# Patient Record
Sex: FEMALE | Race: WHITE | NOT HISPANIC OR LATINO | Employment: OTHER | URBAN - NONMETROPOLITAN AREA
[De-identification: names, ages, dates, MRNs, and addresses within clinical notes are randomized per-mention and may not be internally consistent; named-entity substitution may affect disease eponyms.]

---

## 2021-08-05 ENCOUNTER — ANESTHESIA EVENT (INPATIENT)
Dept: ANESTHESIOLOGY | Facility: HOSPITAL | Age: 81
DRG: 813 | End: 2021-08-05
Payer: MEDICARE

## 2021-08-05 ENCOUNTER — ANESTHESIA (INPATIENT)
Dept: ANESTHESIOLOGY | Facility: HOSPITAL | Age: 81
DRG: 813 | End: 2021-08-05
Payer: MEDICARE

## 2021-08-05 ENCOUNTER — HOSPITAL ENCOUNTER (INPATIENT)
Facility: HOSPITAL | Age: 81
LOS: 8 days | Discharge: HOME HEALTH | DRG: 813 | End: 2021-08-13
Attending: EMERGENCY MEDICINE | Admitting: ANESTHESIOLOGY
Payer: MEDICARE

## 2021-08-05 ENCOUNTER — APPOINTMENT (OUTPATIENT)
Dept: GASTROENTEROLOGY | Facility: HOSPITAL | Age: 81
DRG: 813 | End: 2021-08-05
Attending: INTERNAL MEDICINE
Payer: MEDICARE

## 2021-08-05 ENCOUNTER — ANESTHESIA EVENT (INPATIENT)
Dept: GASTROENTEROLOGY | Facility: HOSPITAL | Age: 81
DRG: 813 | End: 2021-08-05
Payer: MEDICARE

## 2021-08-05 ENCOUNTER — ANESTHESIA (INPATIENT)
Dept: GASTROENTEROLOGY | Facility: HOSPITAL | Age: 81
DRG: 813 | End: 2021-08-05
Payer: MEDICARE

## 2021-08-05 ENCOUNTER — APPOINTMENT (EMERGENCY)
Dept: RADIOLOGY | Facility: HOSPITAL | Age: 81
DRG: 813 | End: 2021-08-05
Payer: MEDICARE

## 2021-08-05 DIAGNOSIS — K92.2 GASTROINTESTINAL HEMORRHAGE, UNSPECIFIED GASTROINTESTINAL HEMORRHAGE TYPE: Primary | ICD-10-CM

## 2021-08-05 DIAGNOSIS — I48.20 CHRONIC ATRIAL FIBRILLATION (HCC): ICD-10-CM

## 2021-08-05 DIAGNOSIS — I48.91 ATRIAL FIBRILLATION WITH RVR (HCC): ICD-10-CM

## 2021-08-05 DIAGNOSIS — D62 ACUTE BLOOD LOSS ANEMIA: ICD-10-CM

## 2021-08-05 DIAGNOSIS — E87.2 LACTIC ACIDOSIS: ICD-10-CM

## 2021-08-05 PROBLEM — R65.10 SIRS (SYSTEMIC INFLAMMATORY RESPONSE SYNDROME) (HCC): Status: ACTIVE | Noted: 2021-08-05

## 2021-08-05 PROBLEM — E78.5 HYPERLIPIDEMIA: Status: ACTIVE | Noted: 2021-08-05

## 2021-08-05 PROBLEM — R79.1 SUPRATHERAPEUTIC INR: Status: ACTIVE | Noted: 2021-08-05

## 2021-08-05 PROBLEM — E87.20 LACTIC ACIDOSIS: Status: ACTIVE | Noted: 2021-08-05

## 2021-08-05 PROBLEM — I10 HYPERTENSION: Status: ACTIVE | Noted: 2021-08-05

## 2021-08-05 LAB
ABO GROUP BLD: NORMAL
ABO GROUP BLD: NORMAL
ALBUMIN SERPL BCP-MCNC: 2.1 G/DL (ref 3.5–5)
ALP SERPL-CCNC: 77 U/L (ref 46–116)
ALT SERPL W P-5'-P-CCNC: 33 U/L (ref 12–78)
ANION GAP SERPL CALCULATED.3IONS-SCNC: 15 MMOL/L (ref 4–13)
APTT PPP: 47 SECONDS (ref 23–37)
AST SERPL W P-5'-P-CCNC: 37 U/L (ref 5–45)
BASOPHILS # BLD AUTO: 0.03 THOUSANDS/ΜL (ref 0–0.1)
BASOPHILS # BLD AUTO: 0.04 THOUSANDS/ΜL (ref 0–0.1)
BASOPHILS NFR BLD AUTO: 0 % (ref 0–1)
BASOPHILS NFR BLD AUTO: 0 % (ref 0–1)
BILIRUB SERPL-MCNC: 1.08 MG/DL (ref 0.2–1)
BLD GP AB SCN SERPL QL: POSITIVE
BLOOD GROUP ANTIBODIES SERPL: NORMAL
BUN SERPL-MCNC: 34 MG/DL (ref 5–25)
CALCIUM ALBUM COR SERPL-MCNC: 8.4 MG/DL (ref 8.3–10.1)
CALCIUM SERPL-MCNC: 6.9 MG/DL (ref 8.3–10.1)
CHLORIDE SERPL-SCNC: 105 MMOL/L (ref 100–108)
CO2 SERPL-SCNC: 21 MMOL/L (ref 21–32)
CREAT SERPL-MCNC: 1.16 MG/DL (ref 0.6–1.3)
E AG RBC QL: NEGATIVE
EOSINOPHIL # BLD AUTO: 0.02 THOUSAND/ΜL (ref 0–0.61)
EOSINOPHIL # BLD AUTO: 0.03 THOUSAND/ΜL (ref 0–0.61)
EOSINOPHIL NFR BLD AUTO: 0 % (ref 0–6)
EOSINOPHIL NFR BLD AUTO: 0 % (ref 0–6)
ERYTHROCYTE [DISTWIDTH] IN BLOOD BY AUTOMATED COUNT: 15.7 % (ref 11.6–15.1)
ERYTHROCYTE [DISTWIDTH] IN BLOOD BY AUTOMATED COUNT: 15.9 % (ref 11.6–15.1)
GFR SERPL CREATININE-BSD FRML MDRD: 44 ML/MIN/1.73SQ M
GLUCOSE SERPL-MCNC: 165 MG/DL (ref 65–140)
HCT VFR BLD AUTO: 24.8 % (ref 34.8–46.1)
HCT VFR BLD AUTO: 25.4 % (ref 34.8–46.1)
HCT VFR BLD AUTO: 26.9 % (ref 34.8–46.1)
HGB BLD-MCNC: 7.5 G/DL (ref 11.5–15.4)
HGB BLD-MCNC: 7.8 G/DL (ref 11.5–15.4)
HGB BLD-MCNC: 8.4 G/DL (ref 11.5–15.4)
IMM GRANULOCYTES # BLD AUTO: 0.27 THOUSAND/UL (ref 0–0.2)
IMM GRANULOCYTES # BLD AUTO: 0.39 THOUSAND/UL (ref 0–0.2)
IMM GRANULOCYTES NFR BLD AUTO: 2 % (ref 0–2)
IMM GRANULOCYTES NFR BLD AUTO: 2 % (ref 0–2)
INR PPP: 1.67 (ref 0.84–1.19)
INR PPP: 5.49 (ref 0.84–1.19)
KELL GROUP AG RBC: NEGATIVE
LACTATE SERPL-SCNC: 1.5 MMOL/L (ref 0.5–2)
LACTATE SERPL-SCNC: 4.1 MMOL/L (ref 0.5–2)
LIPASE SERPL-CCNC: 329 U/L (ref 73–393)
LITTLE C AG RBC QL: NEGATIVE
LYMPHOCYTES # BLD AUTO: 1.84 THOUSANDS/ΜL (ref 0.6–4.47)
LYMPHOCYTES # BLD AUTO: 2.7 THOUSANDS/ΜL (ref 0.6–4.47)
LYMPHOCYTES NFR BLD AUTO: 10 % (ref 14–44)
LYMPHOCYTES NFR BLD AUTO: 16 % (ref 14–44)
MCH RBC QN AUTO: 27.9 PG (ref 26.8–34.3)
MCH RBC QN AUTO: 28.1 PG (ref 26.8–34.3)
MCHC RBC AUTO-ENTMCNC: 30.2 G/DL (ref 31.4–37.4)
MCHC RBC AUTO-ENTMCNC: 30.7 G/DL (ref 31.4–37.4)
MCV RBC AUTO: 91 FL (ref 82–98)
MCV RBC AUTO: 93 FL (ref 82–98)
MONOCYTES # BLD AUTO: 0.93 THOUSAND/ΜL (ref 0.17–1.22)
MONOCYTES # BLD AUTO: 1.51 THOUSAND/ΜL (ref 0.17–1.22)
MONOCYTES NFR BLD AUTO: 5 % (ref 4–12)
MONOCYTES NFR BLD AUTO: 9 % (ref 4–12)
NEUTROPHILS # BLD AUTO: 12.63 THOUSANDS/ΜL (ref 1.85–7.62)
NEUTROPHILS # BLD AUTO: 15.06 THOUSANDS/ΜL (ref 1.85–7.62)
NEUTS SEG NFR BLD AUTO: 73 % (ref 43–75)
NEUTS SEG NFR BLD AUTO: 83 % (ref 43–75)
NRBC BLD AUTO-RTO: 0 /100 WBCS
NRBC BLD AUTO-RTO: 0 /100 WBCS
PLATELET # BLD AUTO: 275 THOUSANDS/UL (ref 149–390)
PLATELET # BLD AUTO: 345 THOUSANDS/UL (ref 149–390)
PMV BLD AUTO: 10.2 FL (ref 8.9–12.7)
PMV BLD AUTO: 10.3 FL (ref 8.9–12.7)
POTASSIUM SERPL-SCNC: 4.7 MMOL/L (ref 3.5–5.3)
PROT SERPL-MCNC: 5.4 G/DL (ref 6.4–8.2)
PROTHROMBIN TIME: 19.3 SECONDS (ref 11.6–14.5)
PROTHROMBIN TIME: 48.6 SECONDS (ref 11.6–14.5)
RBC # BLD AUTO: 2.67 MILLION/UL (ref 3.81–5.12)
RBC # BLD AUTO: 2.8 MILLION/UL (ref 3.81–5.12)
RH BLD: POSITIVE
RH BLD: POSITIVE
SODIUM SERPL-SCNC: 141 MMOL/L (ref 136–145)
SPECIMEN EXPIRATION DATE: NORMAL
TROPONIN I SERPL-MCNC: <0.02 NG/ML
WBC # BLD AUTO: 17.18 THOUSAND/UL (ref 4.31–10.16)
WBC # BLD AUTO: 18.27 THOUSAND/UL (ref 4.31–10.16)

## 2021-08-05 PROCEDURE — 99291 CRITICAL CARE FIRST HOUR: CPT | Performed by: EMERGENCY MEDICINE

## 2021-08-05 PROCEDURE — 96366 THER/PROPH/DIAG IV INF ADDON: CPT

## 2021-08-05 PROCEDURE — 88342 IMHCHEM/IMCYTCHM 1ST ANTB: CPT | Performed by: PATHOLOGY

## 2021-08-05 PROCEDURE — 99291 CRITICAL CARE FIRST HOUR: CPT | Performed by: ANESTHESIOLOGY

## 2021-08-05 PROCEDURE — 86902 BLOOD TYPE ANTIGEN DONOR EA: CPT

## 2021-08-05 PROCEDURE — 84484 ASSAY OF TROPONIN QUANT: CPT | Performed by: EMERGENCY MEDICINE

## 2021-08-05 PROCEDURE — 96375 TX/PRO/DX INJ NEW DRUG ADDON: CPT

## 2021-08-05 PROCEDURE — 86870 RBC ANTIBODY IDENTIFICATION: CPT | Performed by: EMERGENCY MEDICINE

## 2021-08-05 PROCEDURE — 80053 COMPREHEN METABOLIC PANEL: CPT | Performed by: EMERGENCY MEDICINE

## 2021-08-05 PROCEDURE — 85730 THROMBOPLASTIN TIME PARTIAL: CPT | Performed by: EMERGENCY MEDICINE

## 2021-08-05 PROCEDURE — 36430 TRANSFUSION BLD/BLD COMPNT: CPT

## 2021-08-05 PROCEDURE — 85014 HEMATOCRIT: CPT | Performed by: NURSE PRACTITIONER

## 2021-08-05 PROCEDURE — C9113 INJ PANTOPRAZOLE SODIUM, VIA: HCPCS | Performed by: EMERGENCY MEDICINE

## 2021-08-05 PROCEDURE — 99285 EMERGENCY DEPT VISIT HI MDM: CPT

## 2021-08-05 PROCEDURE — 85025 COMPLETE CBC W/AUTO DIFF WBC: CPT | Performed by: EMERGENCY MEDICINE

## 2021-08-05 PROCEDURE — 71045 X-RAY EXAM CHEST 1 VIEW: CPT

## 2021-08-05 PROCEDURE — 85610 PROTHROMBIN TIME: CPT | Performed by: NURSE PRACTITIONER

## 2021-08-05 PROCEDURE — 93005 ELECTROCARDIOGRAM TRACING: CPT

## 2021-08-05 PROCEDURE — 83690 ASSAY OF LIPASE: CPT | Performed by: EMERGENCY MEDICINE

## 2021-08-05 PROCEDURE — 86850 RBC ANTIBODY SCREEN: CPT | Performed by: EMERGENCY MEDICINE

## 2021-08-05 PROCEDURE — 85610 PROTHROMBIN TIME: CPT | Performed by: EMERGENCY MEDICINE

## 2021-08-05 PROCEDURE — 85018 HEMOGLOBIN: CPT | Performed by: NURSE PRACTITIONER

## 2021-08-05 PROCEDURE — 88341 IMHCHEM/IMCYTCHM EA ADD ANTB: CPT | Performed by: PATHOLOGY

## 2021-08-05 PROCEDURE — 96374 THER/PROPH/DIAG INJ IV PUSH: CPT

## 2021-08-05 PROCEDURE — 83605 ASSAY OF LACTIC ACID: CPT | Performed by: EMERGENCY MEDICINE

## 2021-08-05 PROCEDURE — 87040 BLOOD CULTURE FOR BACTERIA: CPT | Performed by: EMERGENCY MEDICINE

## 2021-08-05 PROCEDURE — 1123F ACP DISCUSS/DSCN MKR DOCD: CPT | Performed by: EMERGENCY MEDICINE

## 2021-08-05 PROCEDURE — 88305 TISSUE EXAM BY PATHOLOGIST: CPT | Performed by: PATHOLOGY

## 2021-08-05 PROCEDURE — 30233N1 TRANSFUSION OF NONAUTOLOGOUS RED BLOOD CELLS INTO PERIPHERAL VEIN, PERCUTANEOUS APPROACH: ICD-10-PCS | Performed by: EMERGENCY MEDICINE

## 2021-08-05 PROCEDURE — 86922 COMPATIBILITY TEST ANTIGLOB: CPT

## 2021-08-05 PROCEDURE — 96365 THER/PROPH/DIAG IV INF INIT: CPT

## 2021-08-05 PROCEDURE — 96361 HYDRATE IV INFUSION ADD-ON: CPT

## 2021-08-05 PROCEDURE — 86901 BLOOD TYPING SEROLOGIC RH(D): CPT | Performed by: EMERGENCY MEDICINE

## 2021-08-05 PROCEDURE — 86905 BLOOD TYPING RBC ANTIGENS: CPT

## 2021-08-05 PROCEDURE — 86900 BLOOD TYPING SEROLOGIC ABO: CPT | Performed by: EMERGENCY MEDICINE

## 2021-08-05 PROCEDURE — P9016 RBC LEUKOCYTES REDUCED: HCPCS

## 2021-08-05 PROCEDURE — 85025 COMPLETE CBC W/AUTO DIFF WBC: CPT | Performed by: NURSE PRACTITIONER

## 2021-08-05 PROCEDURE — 0DB68ZX EXCISION OF STOMACH, VIA NATURAL OR ARTIFICIAL OPENING ENDOSCOPIC, DIAGNOSTIC: ICD-10-PCS | Performed by: INTERNAL MEDICINE

## 2021-08-05 PROCEDURE — 86921 COMPATIBILITY TEST INCUBATE: CPT

## 2021-08-05 RX ORDER — SIMVASTATIN 10 MG
10 TABLET ORAL
COMMUNITY

## 2021-08-05 RX ORDER — METOPROLOL SUCCINATE 50 MG/1
50 TABLET, EXTENDED RELEASE ORAL DAILY
COMMUNITY

## 2021-08-05 RX ORDER — PROPOFOL 10 MG/ML
INJECTION, EMULSION INTRAVENOUS AS NEEDED
Status: DISCONTINUED | OUTPATIENT
Start: 2021-08-05 | End: 2021-08-05

## 2021-08-05 RX ORDER — LIDOCAINE HYDROCHLORIDE 10 MG/ML
INJECTION, SOLUTION EPIDURAL; INFILTRATION; INTRACAUDAL; PERINEURAL AS NEEDED
Status: DISCONTINUED | OUTPATIENT
Start: 2021-08-05 | End: 2021-08-05

## 2021-08-05 RX ORDER — SODIUM CHLORIDE, SODIUM GLUCONATE, SODIUM ACETATE, POTASSIUM CHLORIDE, MAGNESIUM CHLORIDE, SODIUM PHOSPHATE, DIBASIC, AND POTASSIUM PHOSPHATE .53; .5; .37; .037; .03; .012; .00082 G/100ML; G/100ML; G/100ML; G/100ML; G/100ML; G/100ML; G/100ML
100 INJECTION, SOLUTION INTRAVENOUS CONTINUOUS
Status: DISCONTINUED | OUTPATIENT
Start: 2021-08-05 | End: 2021-08-05

## 2021-08-05 RX ORDER — PRAVASTATIN SODIUM 20 MG
20 TABLET ORAL
Status: DISCONTINUED | OUTPATIENT
Start: 2021-08-05 | End: 2021-08-13 | Stop reason: HOSPADM

## 2021-08-05 RX ORDER — SUCCINYLCHOLINE/SOD CL,ISO/PF 100 MG/5ML
SYRINGE (ML) INTRAVENOUS AS NEEDED
Status: DISCONTINUED | OUTPATIENT
Start: 2021-08-05 | End: 2021-08-05

## 2021-08-05 RX ORDER — ACETAMINOPHEN 325 MG/1
650 TABLET ORAL EVERY 6 HOURS PRN
Status: DISCONTINUED | OUTPATIENT
Start: 2021-08-05 | End: 2021-08-13 | Stop reason: HOSPADM

## 2021-08-05 RX ORDER — PANTOPRAZOLE SODIUM 40 MG/1
40 TABLET, DELAYED RELEASE ORAL
Status: DISCONTINUED | OUTPATIENT
Start: 2021-08-05 | End: 2021-08-13 | Stop reason: HOSPADM

## 2021-08-05 RX ORDER — WARFARIN SODIUM 7.5 MG/1
TABLET ORAL
COMMUNITY
End: 2021-08-13 | Stop reason: HOSPADM

## 2021-08-05 RX ORDER — ACETAMINOPHEN 325 MG/1
650 TABLET ORAL EVERY 6 HOURS PRN
COMMUNITY

## 2021-08-05 RX ORDER — FERROUS SULFATE 325(65) MG
325 TABLET ORAL
Status: ON HOLD | COMMUNITY
End: 2021-08-13 | Stop reason: SDUPTHER

## 2021-08-05 RX ORDER — DILTIAZEM HYDROCHLORIDE 5 MG/ML
15 INJECTION INTRAVENOUS ONCE
Status: COMPLETED | OUTPATIENT
Start: 2021-08-05 | End: 2021-08-05

## 2021-08-05 RX ORDER — LOPERAMIDE HYDROCHLORIDE 2 MG/1
2 CAPSULE ORAL 4 TIMES DAILY PRN
COMMUNITY

## 2021-08-05 RX ORDER — PANTOPRAZOLE SODIUM 40 MG/1
40 INJECTION, POWDER, FOR SOLUTION INTRAVENOUS ONCE
Status: COMPLETED | OUTPATIENT
Start: 2021-08-05 | End: 2021-08-05

## 2021-08-05 RX ORDER — METOPROLOL SUCCINATE 50 MG/1
50 TABLET, EXTENDED RELEASE ORAL DAILY
Status: DISCONTINUED | OUTPATIENT
Start: 2021-08-05 | End: 2021-08-13 | Stop reason: HOSPADM

## 2021-08-05 RX ORDER — CEFTRIAXONE 1 G/50ML
1000 INJECTION, SOLUTION INTRAVENOUS ONCE
Status: COMPLETED | OUTPATIENT
Start: 2021-08-05 | End: 2021-08-05

## 2021-08-05 RX ORDER — PREDNISONE 20 MG/1
TABLET ORAL DAILY
COMMUNITY
End: 2021-08-13 | Stop reason: HOSPADM

## 2021-08-05 RX ORDER — FENTANYL CITRATE 50 UG/ML
INJECTION, SOLUTION INTRAMUSCULAR; INTRAVENOUS AS NEEDED
Status: DISCONTINUED | OUTPATIENT
Start: 2021-08-05 | End: 2021-08-05

## 2021-08-05 RX ADMIN — PROPOFOL 120 MG: 10 INJECTION, EMULSION INTRAVENOUS at 13:22

## 2021-08-05 RX ADMIN — PANTOPRAZOLE SODIUM 40 MG: 40 INJECTION, POWDER, FOR SOLUTION INTRAVENOUS at 04:07

## 2021-08-05 RX ADMIN — Medication 3500 UNITS: at 05:53

## 2021-08-05 RX ADMIN — CEFTRIAXONE 1000 MG: 1 INJECTION, SOLUTION INTRAVENOUS at 04:42

## 2021-08-05 RX ADMIN — SODIUM CHLORIDE, SODIUM GLUCONATE, SODIUM ACETATE, POTASSIUM CHLORIDE, MAGNESIUM CHLORIDE, SODIUM PHOSPHATE, DIBASIC, AND POTASSIUM PHOSPHATE 100 ML/HR: .53; .5; .37; .037; .03; .012; .00082 INJECTION, SOLUTION INTRAVENOUS at 10:28

## 2021-08-05 RX ADMIN — Medication 100 MG: at 13:22

## 2021-08-05 RX ADMIN — SODIUM CHLORIDE 1500 ML: 0.9 INJECTION, SOLUTION INTRAVENOUS at 04:45

## 2021-08-05 RX ADMIN — PRAVASTATIN SODIUM 20 MG: 20 TABLET ORAL at 16:17

## 2021-08-05 RX ADMIN — DILTIAZEM HYDROCHLORIDE 15 MG: 5 INJECTION INTRAVENOUS at 04:07

## 2021-08-05 RX ADMIN — LIDOCAINE HYDROCHLORIDE 50 MG: 10 INJECTION, SOLUTION EPIDURAL; INFILTRATION; INTRACAUDAL; PERINEURAL at 13:22

## 2021-08-05 RX ADMIN — PHYTONADIONE 5 MG: 10 INJECTION, EMULSION INTRAMUSCULAR; INTRAVENOUS; SUBCUTANEOUS at 11:45

## 2021-08-05 RX ADMIN — SODIUM CHLORIDE 500 ML: 0.9 INJECTION, SOLUTION INTRAVENOUS at 04:12

## 2021-08-05 RX ADMIN — FENTANYL CITRATE 50 MCG: 50 INJECTION, SOLUTION INTRAMUSCULAR; INTRAVENOUS at 13:22

## 2021-08-05 RX ADMIN — SODIUM CHLORIDE 8 MG/HR: 9 INJECTION, SOLUTION INTRAVENOUS at 04:53

## 2021-08-05 RX ADMIN — PANTOPRAZOLE SODIUM 40 MG: 40 TABLET, DELAYED RELEASE ORAL at 16:17

## 2021-08-05 NOTE — H&P
[address]  H&P- Chirag Brooks 1940, 78 y o  female MRN: [id number]  Unit/Bed#: -01 Encounter: [id number]  Primary Care Provider: Poncho Vyas MD   Date and time admitted to hospital: 8/5/2021  4:02 AM    hemmorrhagic shock secondary to GI bleed  Assessment & Plan  · Secondary to GI bleeding as well as coagulopathy due to anticoagulation (coumadin)   · Son and patient report hx of GI bleed 15 years ago  · GI consulted and apprec recs  · Initial INR 5 49 - reversal with Kcentra per emergency medicine  · Patient transfused with 1 units uncross matched packed red blood cells-2 nd unit pending cross match of antibodies as well as fluid resuscitation  During recent admission to [facility] in, patient was found to have heme-positive stool, as well as intramuscular hematoma    During that admission, the patient did receive 1 unit packed red blood cells, 4 units FFP and vitamin K for anticoagulant reversal   · Transfuse for Hbg >7 0  · Hold all anticoagulation/antiplatelet medications at this time  · Strict cardiopulmonary monitoring    Supratherapeutic INR  Assessment & Plan  · Chronically on coumadin for hx of Afib and PE  · INR 5 49 on admission  · Received Kcentra 3,500 units in ED  · Trend INR    Atrial fibrillation ([address] )  Assessment & Plan  · Patient reports use of Coumadin times approximately 20 years  · Patient is additionally rate controlled with Toprol XL 50 mg daily  · Hold anticoagulation  at this time  · Currently rate controlled in the low 90's      Lactic acidosis  Assessment & Plan  · In the setting of acute blood loss anemia  · 4 5 on Admission  · Fluid resuscitated with 2 L of NSS  · Repeat 1 5    Hyperlipidemia  Assessment & Plan  · Will substitute home pravachol for Lipitor while admitted    Hypertension  Assessment & Plan  · Currently holding home antihypertensives    SIRS (systemic inflammatory response syndrome) (HCC)  Assessment & Plan  · POA AEB tachycardia, lactic acidosis, and leukocytosis  · Most likely secondary to acute anemia, not concerning at this time for infectious etiology      -------------------------------------------------------------------------------------------------------------  Chief Complaint:  Vomiting    History of Present Illness     Rogelio Roca is a 78 y o  female a past medical history of AFib on Coumadin, PE, hypertension, hyperlipidemia who presents with symptomatic anemia secondary to GI bleed  Patient reported she had 1 episode of a large bloody vomitus as well as a large bloody BM yesterday  Hemoglobin at that time was 9 3, however today was down to 7 5 and the patient was hypotensive as well as tachycardic upon arrival to the ED  In the ED she was given Cardizem with minimal response  Critical Care is consulted She was transfused 1 units of of un cross-matched blood with appropriate response  She is also noted to have a supra therapeutic INR of 5 4 and was reversed with Kcentra prior to receiving blood transfusion Patient was started on Protonix drip as admitted to the ICU for further monitoring  History obtained from chart review and the patient   -------------------------------------------------------------------------------------------------------------  Dispo: Admit to Stepdown Level 1    Code Status: Level 1 - Full Code  --------------------------------------------------------------------------------------------------------------  Review of Systems   Constitutional: Negative for activity change and fatigue  HENT: Negative for sore throat  Respiratory: Negative for shortness of breath  Cardiovascular: Negative for chest pain, palpitations and leg swelling  Gastrointestinal: Positive for blood in stool and vomiting  Negative for abdominal distention and abdominal pain  Genitourinary: Negative for difficulty urinating  Musculoskeletal: Negative for arthralgias and joint swelling     Neurological: Negative for dizziness, weakness and light-headedness  All other systems reviewed and are negative  A 12-point, complete review of systems was reviewed and negative except as stated above     Physical Exam  Constitutional:       Appearance: She is obese  She is not ill-appearing  HENT:      Head: Normocephalic and atraumatic  Mouth/Throat:      Mouth: Mucous membranes are moist    Eyes:      Extraocular Movements: Extraocular movements intact  Conjunctiva/sclera: Conjunctivae normal       Pupils: Pupils are equal, round, and reactive to light  Cardiovascular:      Rate and Rhythm: Rhythm irregularly irregular  Pulses: Normal pulses  Heart sounds: S1 normal and S2 normal    Pulmonary:      Effort: No respiratory distress  Breath sounds: Normal breath sounds  Comments: CTA in all lung fields  Abdominal:      General: Bowel sounds are normal       Palpations: Abdomen is soft  Tenderness: There is no abdominal tenderness  Musculoskeletal:         General: Normal range of motion  Right lower leg: No edema  Left lower leg: No edema  Skin:     General: Skin is warm  Capillary Refill: Capillary refill takes 2 to 3 seconds  Coloration: Skin is pale  Neurological:      General: No focal deficit present  Mental Status: She is oriented to person, place, and time  GCS: GCS eye subscore is 4  GCS verbal subscore is 5  GCS motor subscore is 6  Psychiatric:         Attention and Perception: Attention and perception normal          Speech: Speech normal          Behavior: Behavior normal  Behavior is cooperative  Thought Content:  Thought content normal        --------------------------------------------------------------------------------------------------------------  Vitals:   Vitals:    08/05/21 0645 08/05/21 0730 08/05/21 0800 08/05/21 0855   BP: 113/60 115/59 119/70 124/65   BP Location: Left arm Left arm Left arm Left arm   Pulse: 96 104 99 99   Resp: 22 (!) 29 (!) 30 (!) 28   Temp:    98 1 °F (36 7 °C)   TempSrc:    Oral   SpO2: 93% 97% 93% 95%   Weight:    92 2 kg (203 lb 4 2 oz)   Height:    5' 4" (1 626 m)     Temp  Min: 97 2 °F (36 2 °C)  Max: 98 1 °F (36 7 °C)  IBW (Ideal Body Weight): 54 7 kg  Height: 5' 4" (162 6 cm)  Body mass index is 34 89 kg/m²  Laboratory and Diagnostics:  Results from last 7 days   Lab Units 08/05/21  0407   WBC Thousand/uL 18 27*   HEMOGLOBIN g/dL 7 5*   HEMATOCRIT % 24 8*   PLATELETS Thousands/uL 345   NEUTROS PCT % 83*   MONOS PCT % 5     Results from last 7 days   Lab Units 08/05/21  0407   SODIUM mmol/L 141   POTASSIUM mmol/L 4 7   CHLORIDE mmol/L 105   CO2 mmol/L 21   ANION GAP mmol/L 15*   BUN mg/dL 34*   CREATININE mg/dL 1 16   CALCIUM mg/dL 6 9*   GLUCOSE RANDOM mg/dL 165*   ALT U/L 33   AST U/L 37   ALK PHOS U/L 77   ALBUMIN g/dL 2 1*   TOTAL BILIRUBIN mg/dL 1 08*          Results from last 7 days   Lab Units 08/05/21  0407   INR  5 49*   PTT seconds 47*      Results from last 7 days   Lab Units 08/05/21  0407   TROPONIN I ng/mL <0 02     Results from last 7 days   Lab Units 08/05/21  0641 08/05/21  0407   LACTIC ACID mmol/L 1 5 4 1*     ABG:    VBG:          Micro:        EKG: Atrial Fibrillation rate 102  Imaging:  XR chest 1 view portable   ED Interpretation by Kylie Carvalho MD (67/06 [id number])   Questionable slight pulmonary vascular congestion  Patient has no complaints of shortness of breath  Final Result by Kiarra Pichardo MD (08/05 [id number])      No acute cardiopulmonary disease  Workstation performed: [id number]            I have personally reviewed pertinent reports          Historical Information   Past Medical History:   Diagnosis Date    Anemia     Atrial fibrillation (HCC)     Bradycardia     GERD (gastroesophageal reflux disease)     History of pulmonary embolus (PE)     History of transfusion     Hypertension     Osteoarthritis     Radiculopathy     Vitamin D deficiency      Past Surgical History:   Procedure Laterality Date    APPENDECTOMY      BACK SURGERY      EYE SURGERY      JOINT REPLACEMENT       Social History   Social History     Substance and Sexual Activity   Alcohol Use Not Currently    Alcohol/week: 0 0 standard drinks    Comment: 0     Social History     Substance and Sexual Activity   Drug Use Never     Social History     Tobacco Use   Smoking Status Never Smoker   Smokeless Tobacco Never Used     Exercise History:[employer]  Family History:   History reviewed  No pertinent family history  I have reviewed this patient's family history and commented on sigificant items within the HPI      Medications:  Current Facility-Administered Medications   Medication Dose Route Frequency    acetaminophen (TYLENOL) tablet 650 mg  650 mg Oral Q6H PRN    metoprolol succinate (TOPROL-XL) 24 hr tablet 50 mg  50 mg Oral Daily    pantoprazole (PROTONIX) 80 mg in sodium chloride 0 9 % 100 mL infusion  8 mg/hr Intravenous Continuous    pravastatin (PRAVACHOL) tablet 20 mg  20 mg Oral Daily With Dinner     Home medications:  Prior to Admission Medications   Prescriptions Last Dose Informant Patient Reported? Taking?    Latanoprost 0 005 % EMUL 8/4/2021 at Unknown time  Yes Yes   Sig: Apply to eye   acetaminophen (TYLENOL) 325 mg tablet 8/4/2021 at Unknown time  Yes Yes   Sig: Take 650 mg by mouth every 6 (six) hours as needed for mild pain   ferrous sulfate 325 (65 Fe) mg tablet 8/4/2021 at Unknown time  Yes Yes   Sig: Take 325 mg by mouth daily with breakfast   loperamide (Imodium A-D) 2 mg capsule 8/4/2021 at Unknown time  Yes Yes   Sig: Take 2 mg by mouth 4 (four) times a day as needed for diarrhea   metoprolol succinate (TOPROL-XL) 50 mg 24 hr tablet 8/4/2021 at Unknown time  Yes Yes   Sig: Take 50 mg by mouth daily   predniSONE 20 mg tablet 8/4/2021 at Unknown time  Yes Yes   Sig: Take by mouth daily   simvastatin (ZOCOR) 10 mg tablet 8/4/2021 at Unknown time  Yes Yes Sig: Take 10 mg by mouth daily at bedtime   warfarin (COUMADIN) 7 5 mg tablet 8/4/2021 at Unknown time  Yes Yes   Sig: Take by mouth daily      Facility-Administered Medications: None     Allergies: Allergies   Allergen Reactions    Nsaids Other (See Comments)     ulcers       ------------------------------------------------------------------------------------------------------------  Advance Directive and Living Will:      Power of :    POLST:    ------------------------------------------------------------------------------------------------------------  Anticipated Length of Stay is > 2 midnights    Care Time Delivered:   Upon my evaluation, this patient had a high probability of imminent or life-threatening deterioration due to Blood loss anemia, which required my direct attention, intervention, and personal management  I have personally provided 30 minutes (0730 to 0800) of critical care time, exclusive of procedures, teaching, family meetings, and any prior time recorded by providers other than myself  CHARLI Louie        Portions of the record may have been created with voice recognition software  Occasional wrong word or "sound a like" substitutions may have occurred due to the inherent limitations of voice recognition software    Read the chart carefully and recognize, using context, where substitutions have occurred

## 2021-08-05 NOTE — ANESTHESIA POSTPROCEDURE EVALUATION
Post-Op Assessment Note    CV Status:  Stable  Pain Score: 0    Pain management: adequate     Mental Status:  Alert and awake   Hydration Status:  Stable   PONV Controlled:  Controlled   Airway Patency:  Patent      Post Op Vitals Reviewed: Yes      Staff: CRNA         No complications documented      BP  95/70    Temp      Pulse 108   Resp   22   SpO2   98

## 2021-08-05 NOTE — ASSESSMENT & PLAN NOTE
· Patient reports use of Coumadin times approximately 20 years  · Patient is additionally rate controlled with Toprol XL 50 mg daily  · Hold anticoagulation  at this time  · Currently rate controlled in the low 90's

## 2021-08-05 NOTE — CONSULTS
Consultation - [facility]  Yumiko Garcia 78 y o  female MRN: [id number]  Unit/Bed#: -01 Encounter: [id number]        Inpatient consult to gastroenterology  Consult performed by: Sahra Sal MD  Consult ordered by: Tawanda Birmingham          Reason for Consult / Principal Problem:     Upper GI bleed  Hematemesis  Hematochezia  Acute anemia  Supratherapeutic INR    ASSESSMENT AND PLAN:      Upper GI bleed  Hematemesis  Hematochezia  Acute anemia  Supratherapeutic INR  -given her symptoms suspect upper GI bleed and most likely likely PUD, other differentials include Dieulafoy's lesions, AVMs, polyps  -status post Kcentra and vitamin K with improvement in INR  -status post 1 unit PRBCs with hemoglobin greater than 7  -agree with Protonix infusion  -keep NPO  -EGD today, if no upper GI source found may need to consider colonoscopy tomorrow    ______________________________________________________________________    HPI:  49-year-old female seen in consultation for hematemesis, melena, acute anemia in the setting of supratherapeutic INR on Coumadin  She has significant past medical history for AFib, VTE on Coumadin, hyperlipidemia, hypertension, GERD  Patient presents with hematochezia, melena and hematemesis early this a m   In the ED she was hypotensive and also had AFib with RVR  INR was 5 49  Presenting hemoglobin was 7 5 down from 9 3 on 08/02  INR was 5 49  She received Kcentra and a total of 1 unit PRBC with hemoglobin increased to 7 8  Rosella Goldmann She was recently admitted to [facility] on 07/20 for worsening hip pain thought to have worsening of her radiculopathy from spinal stenosis and was placed on steroids briefly  At that time she also developed anemia but CT scan was negative for retroperitoneal bleed, however there was an intramuscular hematoma in the left gluteus medius and minimus and presacral hematoma    They held her anticoagulation was given 1 unit PRBC and given 4 units of FFP and vitamin K her prednisone was discontinued and her anticoagulation was restarted  She was evaluated by GI and they recommended outpatient follow-up and workup of her anemia  REVIEW OF SYSTEMS:    CONSTITUTIONAL: Denies any fever, chills, rigors, and weight loss  HEENT: No earache or tinnitus  Denies hearing loss or visual disturbances  CARDIOVASCULAR: No chest pain or palpitations  RESPIRATORY: Denies any cough, hemoptysis, shortness of breath or dyspnea on exertion  GASTROINTESTINAL: As noted in the History of Present Illness  GENITOURINARY: No problems with urination  Denies any hematuria or dysuria  NEUROLOGIC: No dizziness or vertigo, denies headaches  MUSCULOSKELETAL: Denies any muscle or joint pain  SKIN: Denies skin rashes or itching  ENDOCRINE: Denies excessive thirst  Denies intolerance to heat or cold  PSYCHOSOCIAL: Denies depression or anxiety  Denies any recent memory loss  Historical Information   Past Medical History:   Diagnosis Date    Anemia     Atrial fibrillation (HCC)     Bradycardia     GERD (gastroesophageal reflux disease)     History of pulmonary embolus (PE)     History of transfusion     Hypertension     Osteoarthritis     Radiculopathy     Vitamin D deficiency      Past Surgical History:   Procedure Laterality Date    APPENDECTOMY      BACK SURGERY      EYE SURGERY      JOINT REPLACEMENT       Social History   Social History     Substance and Sexual Activity   Alcohol Use Not Currently    Alcohol/week: 0 0 standard drinks    Comment: 0     Social History     Substance and Sexual Activity   Drug Use Never     Social History     Tobacco Use   Smoking Status Never Smoker   Smokeless Tobacco Never Used     History reviewed  No pertinent family history      Meds/Allergies     Medications Prior to Admission   Medication    acetaminophen (TYLENOL) 325 mg tablet    ferrous sulfate 325 (65 Fe) mg tablet    Latanoprost 0 005 % EMUL    loperamide (Imodium A-D) 2 mg capsule    metoprolol succinate (TOPROL-XL) 50 mg 24 hr tablet    predniSONE 20 mg tablet    simvastatin (ZOCOR) 10 mg tablet    warfarin (COUMADIN) 7 5 mg tablet     Current Facility-Administered Medications   Medication Dose Route Frequency    acetaminophen (TYLENOL) tablet 650 mg  650 mg Oral Q6H PRN    metoprolol succinate (TOPROL-XL) 24 hr tablet 50 mg  50 mg Oral Daily    multi-electrolyte (PLASMALYTE-A/ISOLYTE-S PH 7 4) IV solution  100 mL/hr Intravenous Continuous    pantoprazole (PROTONIX) 80 mg in sodium chloride 0 9 % 100 mL infusion  8 mg/hr Intravenous Continuous    pravastatin (PRAVACHOL) tablet 20 mg  20 mg Oral Daily With Dinner       Allergies   Allergen Reactions    Nsaids Other (See Comments)     ulcers           Objective     Blood pressure 112/57, pulse 104, temperature 98 6 °F (37 °C), resp  rate 20, height 5' 4" (1 626 m), weight 92 1 kg (203 lb), SpO2 94 %  Body mass index is 34 84 kg/m²        Intake/Output Summary (Last 24 hours) at 8/5/2021 1410  Last data filed at 8/5/2021 1230  Gross per 24 hour   Intake 516 67 ml   Output --   Net 516 67 ml         PHYSICAL EXAM:      General Appearance:   Alert, cooperative, no distress, pale appearing   HEENT:   Normocephalic, atraumatic, anicteric      Neck:  Supple, symmetrical, trachea midline   Lungs:   respirations unlabored    Heart[de-identified]   Tachycardic    Abdomen:   Soft, non-tender, non-distended; normal bowel sounds; no masses, no organomegaly    Genitalia:   Deferred    Rectal:   Deferred    Extremities:  No cyanosis, clubbing or edema        Skin:  No jaundice, rashes, or lesions    Lymph nodes:  No palpable cervical lymphadenopathy        Lab Results:   Admission on 08/05/2021   Component Date Value    WBC 08/05/2021 18 27*    RBC 08/05/2021 2 67*    Hemoglobin 08/05/2021 7 5*    Hematocrit 08/05/2021 24 8*    MCV 08/05/2021 93     MCH 08/05/2021 28 1     MCHC 08/05/2021 30 2*    RDW 08/05/2021 15 9*    MPV 08/05/2021 10 3     Platelets 74/42/1477 345     nRBC 08/05/2021 0     Neutrophils Relative 08/05/2021 83*    Immat GRANS % 08/05/2021 2     Lymphocytes Relative 08/05/2021 10*    Monocytes Relative 08/05/2021 5     Eosinophils Relative 08/05/2021 0     Basophils Relative 08/05/2021 0     Neutrophils Absolute 08/05/2021 15 06*    Immature Grans Absolute 08/05/2021 0 39*    Lymphocytes Absolute 08/05/2021 1 84     Monocytes Absolute 08/05/2021 0 93     Eosinophils Absolute 08/05/2021 0 02     Basophils Absolute 08/05/2021 0 03     Protime 08/05/2021 48 6*    INR 08/05/2021 5 49*    PTT 08/05/2021 47*    Sodium 08/05/2021 141     Potassium 08/05/2021 4 7     Chloride 08/05/2021 105     CO2 08/05/2021 21     ANION GAP 08/05/2021 15*    BUN 08/05/2021 34*    Creatinine 08/05/2021 1 16     Glucose 08/05/2021 165*    Calcium 08/05/2021 6 9*    Corrected Calcium 08/05/2021 8 4     AST 08/05/2021 37     ALT 08/05/2021 33     Alkaline Phosphatase 08/05/2021 77     Total Protein 08/05/2021 5 4*    Albumin 08/05/2021 2 1*    Total Bilirubin 08/05/2021 1 08*    eGFR 08/05/2021 44     Lipase 08/05/2021 329     LACTIC ACID 08/05/2021 4 1*    Troponin I 08/05/2021 <0 02     ABO Grouping 08/05/2021 B     Rh Factor 08/05/2021 Positive     Antibody Screen 08/05/2021 Positive     Specimen Expiration Date 08/05/2021 [id number]     Unit Product Code 08/05/2021 [id number]     Unit Number 08/05/2021 [id number]     Unit ABO 08/05/2021 O     Unit RH 08/05/2021 POS     Crossmatch 08/05/2021 Compatible     Unit Dispense Status 08/05/2021 Issued     Unit Product Volume 08/05/2021 350     ABO Grouping 08/05/2021 B     Rh Factor 08/05/2021 Positive     Blood Culture 08/05/2021 Received in Microbiology Lab  Culture in Progress   Blood Culture 08/05/2021 Received in Microbiology Lab  Culture in Progress   LACTIC ACID 08/05/2021 1 5     ANTIBODY ID   #1 08/05/2021 Anti-c     Protime 08/05/2021 19 3*    INR 08/05/2021 1 67*    WBC 08/05/2021 17 18*    RBC 08/05/2021 2 80*    Hemoglobin 08/05/2021 7 8*    Hematocrit 08/05/2021 25 4*    MCV 08/05/2021 91     MCH 08/05/2021 27 9     MCHC 08/05/2021 30 7*    RDW 08/05/2021 15 7*    MPV 08/05/2021 10 2     Platelets 65/48/2072 275     nRBC 08/05/2021 0     Neutrophils Relative 08/05/2021 73     Immat GRANS % 08/05/2021 2     Lymphocytes Relative 08/05/2021 16     Monocytes Relative 08/05/2021 9     Eosinophils Relative 08/05/2021 0     Basophils Relative 08/05/2021 0     Neutrophils Absolute 08/05/2021 12 63*    Immature Grans Absolute 08/05/2021 0 27*    Lymphocytes Absolute 08/05/2021 2 70     Monocytes Absolute 08/05/2021 1 51*    Eosinophils Absolute 08/05/2021 0 03     Basophils Absolute 08/05/2021 0 04     E ANTIGEN 08/05/2021 Negative     c ANTIGEN 08/05/2021 Negative     MICH ANTIGEN 08/05/2021 Negative        Imaging Studies: I have personally reviewed pertinent imaging studies

## 2021-08-05 NOTE — QUICK NOTE
Progress Note - Triage Asssessment   Karly Aguilera 78 y o  female MRN: [id number]    Time Called: 4025  Date Called: 08/05/21  Room#: 02  Person requesting evaluation: Dr Hilary Jackson     Situation:     Patient is an 43-year-old female with a past medical history atrial fibrillation, on Coumadin, who presents to the [address] [place] [address] Emergency Department due to symptomatic anemia due to GI bleed  Earlier in the day, the patient had 1 episode of large volume bloody vomitus, as well as a large bloody bowel movement  Patient's hemoglobin yesterday was 9 3, today in the emergency department is 7 5  The patient was hypotensive with EMS prior to arrival, as well as tachycardic  Patient was given Cardizem in the emergency department  Upon evaluation by Critical Care, the patient is lying in bed in no acute distress  She is mentating appropriately, though she is pale  During evaluation, patient was hypotensive in the high 64N systolic  Interventions:   No acute interventions were required during initial evaluation          Triage Assessment:     Recommend patient receive 2 units of PRBCs, as well as fluid resuscitation  INR to be reversed by emergency medicine attending upon receipt of INR, pending during initial evaluation  Recommend continued close cardiopulmonary monitoring and further transfusion if viral signs become unstable  Recommendations discussed with Dr Hilary Jackson

## 2021-08-05 NOTE — ASSESSMENT & PLAN NOTE
· POA AEB tachycardia, lactic acidosis, and leukocytosis  · Most likely secondary to acute anemia, not concerning at this time for infectious etiology  · No indication for antibiotics at this time

## 2021-08-05 NOTE — H&P
History and Physical - [address] [facility]  Jaylene Toscano 78 y o  female MRN: [id number]                  HPI: Jaylene Toscano is a 78y o  year old female who presents for EGD for evaluation of hematemesis, melena and acute anemia in the setting of supratherapeutic INR with anticoagulation on Coumadin  She received Kcentra and INR decreased to 1 67 from 5 49  Patient received 1 unit PRBC, her last hemoglobin 7 8 slightly up from 7 5  REVIEW OF SYSTEMS: Per the HPI, and otherwise unremarkable  Historical Information   Past Medical History:   Diagnosis Date    Anemia     Atrial fibrillation (HCC)     Bradycardia     GERD (gastroesophageal reflux disease)     History of pulmonary embolus (PE)     History of transfusion     Hypertension     Osteoarthritis     Radiculopathy     Vitamin D deficiency      Past Surgical History:   Procedure Laterality Date    APPENDECTOMY      BACK SURGERY      EYE SURGERY      JOINT REPLACEMENT       Social History   Social History     Substance and Sexual Activity   Alcohol Use Not Currently    Alcohol/week: 0 0 standard drinks    Comment: 0     Social History     Substance and Sexual Activity   Drug Use Never     Social History     Tobacco Use   Smoking Status Never Smoker   Smokeless Tobacco Never Used     History reviewed  No pertinent family history  Meds/Allergies     Medications Prior to Admission   Medication    acetaminophen (TYLENOL) 325 mg tablet    ferrous sulfate 325 (65 Fe) mg tablet    Latanoprost 0 005 % EMUL    loperamide (Imodium A-D) 2 mg capsule    metoprolol succinate (TOPROL-XL) 50 mg 24 hr tablet    predniSONE 20 mg tablet    simvastatin (ZOCOR) 10 mg tablet    warfarin (COUMADIN) 7 5 mg tablet       Allergies   Allergen Reactions    Nsaids Other (See Comments)     ulcers       Objective     Blood pressure 126/65, pulse (!) 117, temperature 98 3 °F (36 8 °C), temperature source Oral, resp   rate 18, height 5' 4" (1 626 m), weight 92 1 kg (203 lb), SpO2 97 %  PHYSICAL EXAM    Gen: NAD  CV: RRR  CHEST: Clear  ABD: soft, NT/ND  EXT: no edema      ASSESSMENT/PLAN:  This is a 78y o  year old female here for EGD for evaluation of probable upper GI bleed      PLAN:   Procedure:  EGD

## 2021-08-05 NOTE — ASSESSMENT & PLAN NOTE
· In the setting of acute blood loss anemia  · 4 5 on Admission  · Fluid resuscitated with 2 L of NSS  · Repeat 1 5

## 2021-08-05 NOTE — ASSESSMENT & PLAN NOTE
· Patient reports use of Coumadin times approximately 20 years  · Patient is additionally rate controlled with Toprol XL 50 mg daily  · Anticoagulation is held at this time, will restart Coumadin this a m  if INR is therapeutic and H/H is stable  · Currently rate controlled in the low 90's

## 2021-08-05 NOTE — ED NOTES
Lab notified this RN that patient had antibodies in blood that needed further testing before RBCs could be sent unless Dr Marco Antonio Alanis felt the patient was to emergent to wait  After consulting with Dr Marco Antonio Alanis it was decided to do emergent release for blood due to patient condition        Charito Felton, ARGENIS  08/05/21 2752

## 2021-08-05 NOTE — ED PROVIDER NOTES
History  Chief Complaint   Patient presents with    Vomiting     Pt had epsiode of vomiting dark red emesis, pt also reports dark red BM throughout day wiht dizziness and fatigue      Patient is on Coumadin secondary to atrial fibrillation  Has been having dark red bloody stools today  Also vomited dark red blood today  Blood pressure was in the 80s at the scene  When the patient laid down she felt better  Did not feel as weak or tired  Systolic blood pressure then was in the 120s  Has history of atrial fibrillation  Was noted to have episodes of rapid atrial fibrillation in the ambulance up to the 140s  Patient denies any chest pain or shortness of breath  Complains of feeling weak and tired  No history of previous GI bleed  Denies any abdominal pain or chest pain  No shortness of breath  History provided by:  Patient and EMS personnel   used: No    Vomiting  Severity:  Moderate  Timing:  Intermittent  Emesis appearance: Dark red blood  Progression:  Unchanged  Chronicity:  New  Recent urination:  Normal  Relieved by:  Nothing  Worsened by:  Nothing  Ineffective treatments:  None tried  Associated symptoms: no abdominal pain, no chills, no cough, no diarrhea, no fever, no headaches and no sore throat        Prior to Admission Medications   Prescriptions Last Dose Informant Patient Reported? Taking?    Latanoprost 0 005 % EMUL 8/4/2021 at Unknown time  Yes Yes   Sig: Apply to eye   acetaminophen (TYLENOL) 325 mg tablet 8/4/2021 at Unknown time  Yes Yes   Sig: Take 650 mg by mouth every 6 (six) hours as needed for mild pain   ferrous sulfate 325 (65 Fe) mg tablet 8/4/2021 at Unknown time  Yes Yes   Sig: Take 325 mg by mouth daily with breakfast   loperamide (Imodium A-D) 2 mg capsule 8/4/2021 at Unknown time  Yes Yes   Sig: Take 2 mg by mouth 4 (four) times a day as needed for diarrhea   metoprolol succinate (TOPROL-XL) 50 mg 24 hr tablet 8/4/2021 at Unknown time  Yes Yes   Sig: Take 50 mg by mouth daily   predniSONE 20 mg tablet 8/4/2021 at Unknown time  Yes Yes   Sig: Take by mouth daily   simvastatin (ZOCOR) 10 mg tablet 8/4/2021 at Unknown time  Yes Yes   Sig: Take 10 mg by mouth daily at bedtime   warfarin (COUMADIN) 7 5 mg tablet 8/4/2021 at Unknown time  Yes Yes   Sig: Take by mouth daily      Facility-Administered Medications: None       Past Medical History:   Diagnosis Date    Anemia     Atrial fibrillation (HCC)     Bradycardia     GERD (gastroesophageal reflux disease)     History of pulmonary embolus (PE)     Hypertension     Osteoarthritis     Radiculopathy     Vitamin D deficiency        Past Surgical History:   Procedure Laterality Date    JOINT REPLACEMENT         History reviewed  No pertinent family history  I have reviewed and agree with the history as documented  E-Cigarette/Vaping     E-Cigarette/Vaping Substances     Social History     Tobacco Use    Smoking status: Not on file   Substance Use Topics    Alcohol use: Not on file    Drug use: Not on file       Review of Systems   Constitutional: Negative for chills and fever  HENT: Negative for ear pain, hearing loss, sore throat, trouble swallowing and voice change  Eyes: Negative for pain and discharge  Respiratory: Negative for cough, shortness of breath and wheezing  Cardiovascular: Negative for chest pain and palpitations  Gastrointestinal: Positive for blood in stool and vomiting  Negative for abdominal pain, constipation, diarrhea and nausea  Genitourinary: Negative for dysuria, flank pain, frequency and hematuria  Musculoskeletal: Negative for joint swelling, neck pain and neck stiffness  Skin: Negative for rash and wound  Neurological: Positive for dizziness and weakness  Negative for seizures, syncope, facial asymmetry and headaches  Psychiatric/Behavioral: Negative for hallucinations, self-injury and suicidal ideas     All other systems reviewed and are negative  Physical Exam  Physical Exam  Vitals and nursing note reviewed  Constitutional:       General: She is not in acute distress  Appearance: She is well-developed  HENT:      Head: Normocephalic and atraumatic  Comments: Pale in color     Right Ear: External ear normal       Left Ear: External ear normal    Eyes:      General: No scleral icterus  Right eye: No discharge  Left eye: No discharge  Extraocular Movements: Extraocular movements intact  Conjunctiva/sclera: Conjunctivae normal       Comments: Conjunctiva pale   Cardiovascular:      Rate and Rhythm: Tachycardia present  Rhythm irregular  Heart sounds: Normal heart sounds  No murmur heard  Pulmonary:      Effort: Pulmonary effort is normal       Breath sounds: Normal breath sounds  No wheezing or rales  Abdominal:      General: Bowel sounds are normal  There is no distension  Palpations: Abdomen is soft  Tenderness: There is no abdominal tenderness  There is no guarding or rebound  Genitourinary:     Comments: Dark red blood noted  Musculoskeletal:         General: No deformity  Normal range of motion  Cervical back: Normal range of motion and neck supple  Skin:     General: Skin is dry  Findings: No rash  Comments: Cold to touch and pale in color   Neurological:      General: No focal deficit present  Mental Status: She is alert and oriented to person, place, and time  Cranial Nerves: No cranial nerve deficit  Psychiatric:         Mood and Affect: Mood normal          Behavior: Behavior normal          Thought Content:  Thought content normal          Judgment: Judgment normal          Vital Signs  ED Triage Vitals [08/05/21 0403]   Temperature Pulse Respirations Blood Pressure SpO2   (!) 97 2 °F (36 2 °C) (!) 142 20 134/76 93 %      Temp Source Heart Rate Source Patient Position - Orthostatic VS BP Location FiO2 (%)   Temporal Monitor Lying Left arm -- Pain Score       --           Vitals:    08/05/21 0530 08/05/21 0615 08/05/21 0645 08/05/21 0730   BP: 102/57 100/55 113/60 115/59   Pulse: 95 100 96 104   Patient Position - Orthostatic VS: Lying Lying Lying Sitting         Visual Acuity      ED Medications  Medications   pantoprazole (PROTONIX) 80 mg in sodium chloride 0 9 % 100 mL infusion (8 mg/hr Intravenous New Bag 8/5/21 0453)   pantoprazole (PROTONIX) injection 40 mg (40 mg Intravenous Given 8/5/21 0407)   diltiazem (CARDIZEM) injection 15 mg (15 mg Intravenous Given 8/5/21 0407)   sodium chloride 0 9 % bolus 500 mL (0 mL Intravenous Stopped 8/5/21 0517)   cefTRIAXone (ROCEPHIN) IVPB (premix in dextrose) 1,000 mg 50 mL (0 mg Intravenous Stopped 8/5/21 0517)   sodium chloride 0 9 % bolus 1,500 mL (0 mL Intravenous Stopped 8/5/21 0547)   prothrombin complex conc human (KCENTRA) 3,500 Units (3,500 Units Intravenous Given 8/5/21 0553)       Diagnostic Studies  Results Reviewed     Procedure Component Value Units Date/Time    Lactic acid 2 Hours [[id number]]  (Normal) Collected: 08/05/21 0641    Lab Status: Final result Specimen: Blood from Arm, Left Updated: 08/05/21 0710     LACTIC ACID 1 5 mmol/L     Narrative:      Result may be elevated if tourniquet was used during collection  Lactic acid [[id number]]  (Abnormal) Collected: 08/05/21 0407    Lab Status: Final result Specimen: Blood from Arm, Left Updated: 08/05/21 0500     LACTIC ACID 4 1 mmol/L     Narrative:      Result may be elevated if tourniquet was used during collection  Troponin I [[id number]]  (Normal) Collected: 08/05/21 0407    Lab Status: Final result Specimen: Blood from Arm, Left Updated: 08/05/21 0450     Troponin I <0 02 ng/mL     Blood culture #2 [[id number]] Collected: 08/05/21 0440    Lab Status: In process Specimen: Blood from Hand, Right Updated: 08/05/21 0448    Blood culture #1 [[id number]] Collected: 08/05/21 0440    Lab Status:  In process Specimen: Blood from Arm, Left Updated: 08/05/21 0448    Protime-INR [[id number]]  (Abnormal) Collected: 08/05/21 0407    Lab Status: Final result Specimen: Blood from Arm, Left Updated: 08/05/21 0445     Protime 48 6 seconds      INR 5 49    APTT [[id number]]  (Abnormal) Collected: 08/05/21 0407    Lab Status: Final result Specimen: Blood from Arm, Left Updated: 08/05/21 0445     PTT 47 seconds     Comprehensive metabolic panel [[id number]]  (Abnormal) Collected: 08/05/21 0407    Lab Status: Final result Specimen: Blood from Arm, Left Updated: 08/05/21 0445     Sodium 141 mmol/L      Potassium 4 7 mmol/L      Chloride 105 mmol/L      CO2 21 mmol/L      ANION GAP 15 mmol/L      BUN 34 mg/dL      Creatinine 1 16 mg/dL      Glucose 165 mg/dL      Calcium 6 9 mg/dL      Corrected Calcium 8 4 mg/dL      AST 37 U/L      ALT 33 U/L      Alkaline Phosphatase 77 U/L      Total Protein 5 4 g/dL      Albumin 2 1 g/dL      Total Bilirubin 1 08 mg/dL      eGFR 44 ml/min/1 73sq m     Narrative:      [place] guidelines for Chronic Kidney Disease (CKD):     Stage 1 with normal or high GFR (GFR > 90 mL/min/1 73 square meters)    Stage 2 Mild CKD (GFR = 60-89 mL/min/1 73 square meters)    Stage 3A Moderate CKD (GFR = 45-59 mL/min/1 73 square meters)    Stage 3B Moderate CKD (GFR = 30-44 mL/min/1 73 square meters)    Stage 4 Severe CKD (GFR = 15-29 mL/min/1 73 square meters)    Stage 5 End Stage CKD (GFR <15 mL/min/1 73 square meters)  Note: GFR calculation is accurate only with a steady state creatinine    Lipase [[id number]]  (Normal) Collected: 08/05/21 0407    Lab Status: Final result Specimen: Blood from Arm, Left Updated: 08/05/21 0445     Lipase 329 u/L     CBC and differential [[id number]]  (Abnormal) Collected: 08/05/21 0407    Lab Status: Final result Specimen: Blood from Arm, Left Updated: 08/05/21 0415     WBC 18 27 Thousand/uL      RBC 2 67 Million/uL      Hemoglobin 7 5 g/dL      Hematocrit 24 8 %      MCV 93 fL      MCH 28 1 pg MCHC 30 2 g/dL      RDW 15 9 %      MPV 10 3 fL      Platelets 803 Thousands/uL      nRBC 0 /100 WBCs      Neutrophils Relative 83 %      Immat GRANS % 2 %      Lymphocytes Relative 10 %      Monocytes Relative 5 %      Eosinophils Relative 0 %      Basophils Relative 0 %      Neutrophils Absolute 15 06 Thousands/µL      Immature Grans Absolute 0 39 Thousand/uL      Lymphocytes Absolute 1 84 Thousands/µL      Monocytes Absolute 0 93 Thousand/µL      Eosinophils Absolute 0 02 Thousand/µL      Basophils Absolute 0 03 Thousands/µL                  XR chest 1 view portable   ED Interpretation by Yasir Rogel MD (08/05 2623)   Questionable slight pulmonary vascular congestion  Patient has no complaints of shortness of breath                   Procedures  ECG 12 Lead Documentation Only    Date/Time: 8/5/2021 4:08 AM  Performed by: Yasir Rogel MD  Authorized by: Yasir Rogel MD     ECG reviewed by me, the ED Provider: yes    Patient location:  ED  Previous ECG:     Previous ECG:  Unavailable  Rate:     ECG rate:  130  Rhythm:     Rhythm: atrial fibrillation      CriticalCare Time  Performed by: Yasir Rogel MD  Authorized by: Yasir Rogel MD     Critical care provider statement:     Critical care time (minutes):  60    Critical care was necessary to treat or prevent imminent or life-threatening deterioration of the following conditions:  Circulatory failure, shock and sepsis    Critical care was time spent personally by me on the following activities:  Development of treatment plan with patient or surrogate, discussions with consultants, evaluation of patient's response to treatment, examination of patient, review of old charts, re-evaluation of patient's condition, ordering and review of radiographic studies, ordering and review of laboratory studies and ordering and performing treatments and interventions             ED Course  ED Course as of Aug 05 0742   Thu Aug 05, 2021   7990 Blood consent signed and placed on clipboard      0414 Cardizem 15 mg IV given for rapid atrial fibrillation  Patient is still in atrial fibrillation but now rate controlled in the 90s  Systolic blood pressure in the 130s  2797 Systolic blood pressure in the 80s now  Giving IV fluids  6637 Systolic blood pressure now 106 as the patient is laying down now  Last blood pressure of 80 was when she was sitting up for an x-ray  3703 I will use the ideal body weight for the IV fluid bolus as the patient has a BMI greater than 30 and is obese  8959 Will give Kcentra at 35 units/kilogram per dose   INR(!!): 5 72   0443 Critical care has come down to see the patient  Request that the patient get the 2 units of blood and the Kcentra  Will re-evaluate afterward  0451 Patient has antibodies in her blood  Blood bank states it would take a few hours to get her crossed blood to the facility  Will give her on meds blood here  Patient was notified and made aware  1116 Probably secondary to GI bleed and atrial fibrillation with RVR  LACTIC ACID(!!): 4 1   0556 No issue so far during the transfusion  Patient's color has improved  Blood pressure in the high 90s to low 100s  Patient is more awake and talking       6362 Patient seen  She is awake alert and oriented  Has no complaints at present  Laying in bed at a 15 degree incline  Blood pressure is 107/58  Heart rate is 95 and still in atrial fibrillation  Lungs are clear to auscultation  Heart is a regular rate and rhythm  Radial pulses are 2+ bilaterally  Cap refills less than 2 seconds  Neurologic exam is nonfocal       [id number] Patient seen  First unit of blood is in  Blood pressure in the 110s  Patient having no complaints at present  No shortness of breath  Awake and alert  Has not had any more episodes of bloody bowel movements or hematemesis                                  SBIRT 20yo+      Most Recent Value   SBIRT (22 yo +) In order to provide better care to our patients, we are screening all of our patients for alcohol and drug use  Would it be okay to ask you these screening questions? Yes Filed at: 08/05/2021 6638   Initial Alcohol Screen: US AUDIT-C    1  How often do you have a drink containing alcohol?  0 Filed at: 08/05/2021 0612   2  How many drinks containing alcohol do you have on a typical day you are drinking? 0 Filed at: 08/05/2021 0612   3a  Male UNDER 65: How often do you have five or more drinks on one occasion? 0 Filed at: 08/05/2021 0612   3b  FEMALE Any Age, or MALE 65+: How often do you have 4 or more drinks on one occassion? 0 Filed at: 08/05/2021 0612   Audit-C Score  0 Filed at: 08/05/2021 7710   ASHTYN: How many times in the past year have you    Used an illegal drug or used a prescription medication for non-medical reasons? Never Filed at: 08/05/2021 6339                    MDM  Number of Diagnoses or Management Options     Amount and/or Complexity of Data Reviewed  Clinical lab tests: reviewed  Review and summarize past medical records: yes        Disposition  Final diagnoses:   Gastrointestinal hemorrhage, unspecified gastrointestinal hemorrhage type   Atrial fibrillation with RVR ([facility] [address] )   Lactic acidosis     Time reflects when diagnosis was documented in both MDM as applicable and the Disposition within this note     Time User Action Codes Description Comment    8/5/2021  4:09 AM Nicki Bandar Add [K92 2] Gastrointestinal hemorrhage, unspecified gastrointestinal hemorrhage type     8/5/2021  4:09 AM Ricardo Stoddard Add [I48 91] Atrial fibrillation with RVR ([facility] [address] )     8/5/2021  5:01 AM Ricardo Stoddard Add [E87 2] Lactic acidosis       ED Disposition     ED Disposition Condition Date/Time Comment    Admit Stable Thu Aug 5, 2021  7:09 AM Case was discussed with Dr Marilyn Valiente and the patient's admission status was agreed to be  to the service of Dr Marilyn Valiente           Follow-up Information    None Patient's Medications   Discharge Prescriptions    No medications on file     No discharge procedures on file      PDMP Review     None          ED Provider  Electronically Signed by           Gretta Thompson MD  08/05/21 73 [facility] Shweta Valero MD  08/05/21 6820

## 2021-08-05 NOTE — ASSESSMENT & PLAN NOTE
· Secondary to GI bleeding as well as coagulopathy due to anticoagulation (coumadin)   · Son and patient report hx of GI bleed 15 years ago  · GI consulted and apprec recs  · Initial INR 5 49 - reversal with Kcentra per emergency medicine as well as with vitamin K  · Patient transfused with 1 units uncross matched packed red blood cells-2 nd unit pending cross match of antibodies as well as fluid resuscitation  During recent admission to [facility], patient was found to have heme-positive stool, as well as intramuscular hematoma    During that admission, the patient did receive 1 unit packed red blood cells, 4 units FFP and vitamin K for anticoagulant reversal   · Transfuse for Hbg >7 0  · EGD 8/5-3 cm hital hernia,bxs, 2 Jensen class 3 ulcers w/o bleeding  · Protonix drip DC'ed, continue protonix 40 mg PO BID while in hospital then Protonix 40 mg daily for 3 months  · Coumadin currently held, will recheck INR and H/H this a m , if INR is therapeutic in H/H is stable will restart today  · Strict cardiopulmonary monitoring

## 2021-08-05 NOTE — PLAN OF CARE
Problem: Potential for Falls  Goal: Patient will remain free of falls  Description: INTERVENTIONS:  - Educate patient/family on patient safety including physical limitations  - Instruct patient to call for assistance with activity   - Consult OT/PT to assist with strengthening/mobility   - Keep Call bell within reach  - Keep bed low and locked with side rails adjusted as appropriate  - Keep care items and personal belongings within reach  - Initiate and maintain comfort rounds  - Make Fall Risk Sign visible to staff  - Offer Toileting every 2 Hours, in advance of need  - Initiate/Maintain bed alarm  - Obtain necessary fall risk management equipment:   - Apply yellow socks and bracelet for high fall risk patients  - Consider moving patient to room near nurses station  Outcome: Progressing     Problem: CARDIOVASCULAR - ADULT  Goal: Maintains optimal cardiac output and hemodynamic stability  Description: INTERVENTIONS:  - Monitor I/O, vital signs and rhythm  - Monitor for S/S and trends of decreased cardiac output  - Administer and titrate ordered vasoactive medications to optimize hemodynamic stability  - Assess quality of pulses, skin color and temperature  - Assess for signs of decreased coronary artery perfusion  - Instruct patient to report change in severity of symptoms  Outcome: Progressing  Goal: Absence of cardiac dysrhythmias or at baseline rhythm  Description: INTERVENTIONS:  - Continuous cardiac monitoring, vital signs, obtain 12 lead EKG if ordered  - Administer antiarrhythmic and heart rate control medications as ordered  - Monitor electrolytes and administer replacement therapy as ordered  Outcome: Progressing     Problem: GASTROINTESTINAL - ADULT  Goal: Minimal or absence of nausea and/or vomiting  Description: INTERVENTIONS:  - Administer IV fluids if ordered to ensure adequate hydration  - Maintain NPO status until nausea and vomiting are resolved  - Nasogastric tube if ordered  - Administer ordered antiemetic medications as needed  - Provide nonpharmacologic comfort measures as appropriate  - Advance diet as tolerated, if ordered  - Consider nutrition services referral to assist patient with adequate nutrition and appropriate food choices  Outcome: Progressing  Goal: Maintains or returns to baseline bowel function  Description: INTERVENTIONS:  - Assess bowel function  - Encourage oral fluids to ensure adequate hydration  - Administer IV fluids if ordered to ensure adequate hydration  - Administer ordered medications as needed  - Encourage mobilization and activity  - Consider nutritional services referral to assist patient with adequate nutrition and appropriate food choices  Outcome: Progressing  Goal: Maintains adequate nutritional intake  Description: INTERVENTIONS:  - Monitor percentage of each meal consumed  - Identify factors contributing to decreased intake, treat as appropriate  - Assist with meals as needed  - Monitor I&O, weight, and lab values if indicated  - Obtain nutrition services referral as needed  Outcome: Progressing  Goal: Establish and maintain optimal ostomy function  Description: INTERVENTIONS:  - Assess bowel function  - Encourage oral fluids to ensure adequate hydration  - Administer IV fluids if ordered to ensure adequate hydration   - Administer ordered medications as needed  - Encourage mobilization and activity  - Nutrition services referral to assist patient with appropriate food choices  - Assess stoma site  - Consider wound care consult   Outcome: Progressing  Goal: Oral mucous membranes remain intact  Description: INTERVENTIONS  - Assess oral mucosa and hygiene practices  - Implement preventative oral hygiene regimen  - Implement oral medicated treatments as ordered  - Initiate Nutrition services referral as needed  Outcome: Progressing     Problem: METABOLIC, FLUID AND ELECTROLYTES - ADULT  Goal: Electrolytes maintained within normal limits  Description: INTERVENTIONS:  - Monitor labs and assess patient for signs and symptoms of electrolyte imbalances  - Administer electrolyte replacement as ordered  - Monitor response to electrolyte replacements, including repeat lab results as appropriate  - Instruct patient on fluid and nutrition as appropriate  Outcome: Progressing  Goal: Fluid balance maintained  Description: INTERVENTIONS:  - Monitor labs   - Monitor I/O and WT  - Instruct patient on fluid and nutrition as appropriate  - Assess for signs & symptoms of volume excess or deficit  Outcome: Progressing  Goal: Glucose maintained within target range  Description: INTERVENTIONS:  - Monitor Blood Glucose as ordered  - Assess for signs and symptoms of hyperglycemia and hypoglycemia  - Administer ordered medications to maintain glucose within target range  - Assess nutritional intake and initiate nutrition service referral as needed  Outcome: Progressing

## 2021-08-05 NOTE — ASSESSMENT & PLAN NOTE
· Secondary to GI bleeding as well as coagulopathy due to anticoagulation (coumadin)   · Son and patient report hx of GI bleed 15 years ago  · GI consulted and apprec recs  · Initial INR 5 49 - reversal with Kcentra per emergency medicine  · Patient to be transfused with 2 units packed red blood cells as well as fluid resuscitation  During recent admission to [facility] in, patient was found to have heme-positive stool, as well as intramuscular hematoma    During that admission, the patient did receive 1 unit packed red blood cells, 4 units FFP and vitamin K for anticoagulant reversal   · Transfuse for Hbg >7 0  · Hold all anticoagulation/antiplatelet medications at this time  · Strict cardiopulmonary monitoring

## 2021-08-05 NOTE — ASSESSMENT & PLAN NOTE
· POA AEB tachycardia, lactic acidosis, and leukocytosis  · Most likely secondary to acute anemia, not concerning at this time for infectious etiology

## 2021-08-05 NOTE — PLAN OF CARE
Problem: Potential for Falls  Goal: Patient will remain free of falls  Description: INTERVENTIONS:  - Educate patient/family on patient safety including physical limitations  - Instruct patient to call for assistance with activity   - Consult OT/PT to assist with strengthening/mobility   - Keep Call bell within reach  - Keep bed low and locked with side rails adjusted as appropriate  - Keep care items and personal belongings within reach  - Initiate and maintain comfort rounds  - Make Fall Risk Sign visible to staff  - Offer Toileting every 2 Hours, in advance of need  - Initiate/Maintain bed alarm  - Obtain necessary fall risk management equipment:   - Apply yellow socks and bracelet for high fall risk patients  - Consider moving patient to room near nurses station  Outcome: Progressing     Problem: CARDIOVASCULAR - ADULT  Goal: Maintains optimal cardiac output and hemodynamic stability  Description: INTERVENTIONS:  - Monitor I/O, vital signs and rhythm  - Monitor for S/S and trends of decreased cardiac output  - Administer and titrate ordered vasoactive medications to optimize hemodynamic stability  - Assess quality of pulses, skin color and temperature  - Assess for signs of decreased coronary artery perfusion  - Instruct patient to report change in severity of symptoms  Outcome: Progressing  Goal: Absence of cardiac dysrhythmias or at baseline rhythm  Description: INTERVENTIONS:  - Continuous cardiac monitoring, vital signs, obtain 12 lead EKG if ordered  - Administer antiarrhythmic and heart rate control medications as ordered  - Monitor electrolytes and administer replacement therapy as ordered  Outcome: Progressing     Problem: GASTROINTESTINAL - ADULT  Goal: Minimal or absence of nausea and/or vomiting  Description: INTERVENTIONS:  - Administer IV fluids if ordered to ensure adequate hydration  - Maintain NPO status until nausea and vomiting are resolved  - Nasogastric tube if ordered  - Administer ordered antiemetic medications as needed  - Provide nonpharmacologic comfort measures as appropriate  - Advance diet as tolerated, if ordered  - Consider nutrition services referral to assist patient with adequate nutrition and appropriate food choices  Outcome: Progressing  Goal: Maintains or returns to baseline bowel function  Description: INTERVENTIONS:  - Assess bowel function  - Encourage oral fluids to ensure adequate hydration  - Administer IV fluids if ordered to ensure adequate hydration  - Administer ordered medications as needed  - Encourage mobilization and activity  - Consider nutritional services referral to assist patient with adequate nutrition and appropriate food choices  Outcome: Progressing  Goal: Maintains adequate nutritional intake  Description: INTERVENTIONS:  - Monitor percentage of each meal consumed  - Identify factors contributing to decreased intake, treat as appropriate  - Assist with meals as needed  - Monitor I&O, weight, and lab values if indicated  - Obtain nutrition services referral as needed  Outcome: Progressing  Goal: Establish and maintain optimal ostomy function  Description: INTERVENTIONS:  - Assess bowel function  - Encourage oral fluids to ensure adequate hydration  - Administer IV fluids if ordered to ensure adequate hydration   - Administer ordered medications as needed  - Encourage mobilization and activity  - Nutrition services referral to assist patient with appropriate food choices  - Assess stoma site  - Consider wound care consult   Outcome: Progressing  Goal: Oral mucous membranes remain intact  Description: INTERVENTIONS  - Assess oral mucosa and hygiene practices  - Implement preventative oral hygiene regimen  - Implement oral medicated treatments as ordered  - Initiate Nutrition services referral as needed  Outcome: Progressing     Problem: METABOLIC, FLUID AND ELECTROLYTES - ADULT  Goal: Electrolytes maintained within normal limits  Description: INTERVENTIONS:  - Monitor labs and assess patient for signs and symptoms of electrolyte imbalances  - Administer electrolyte replacement as ordered  - Monitor response to electrolyte replacements, including repeat lab results as appropriate  - Instruct patient on fluid and nutrition as appropriate  Outcome: Progressing  Goal: Fluid balance maintained  Description: INTERVENTIONS:  - Monitor labs   - Monitor I/O and WT  - Instruct patient on fluid and nutrition as appropriate  - Assess for signs & symptoms of volume excess or deficit  Outcome: Progressing  Goal: Glucose maintained within target range  Description: INTERVENTIONS:  - Monitor Blood Glucose as ordered  - Assess for signs and symptoms of hyperglycemia and hypoglycemia  - Administer ordered medications to maintain glucose within target range  - Assess nutritional intake and initiate nutrition service referral as needed  Outcome: Progressing     Problem: MOBILITY - ADULT  Goal: Maintain or return to baseline ADL function  Description: INTERVENTIONS:  -  Assess patient's ability to carry out ADLs; assess patient's baseline for ADL function and identify physical deficits which impact ability to perform ADLs (bathing, care of mouth/teeth, toileting, grooming, dressing, etc )  - Assess/evaluate cause of self-care deficits   - Assess range of motion  - Assess patient's mobility; develop plan if impaired  - Assess patient's need for assistive devices and provide as appropriate  - Encourage maximum independence but intervene and supervise when necessary  - Involve family in performance of ADLs  - Assess for home care needs following discharge   - Consider OT consult to assist with ADL evaluation and planning for discharge  - Provide patient education as appropriate  Outcome: Progressing

## 2021-08-05 NOTE — CASE MANAGEMENT
LOS 1 Day  Pt is not a bundle  Pt is not a 30 day readmission  Unplanned readmission score is 16 (green, low risk)  Pt admitted due to anemia/upper GI bleed  CM met with pt at bedside in ICU  CM name and role reviewed  Pt reported that she lives in a 3 story home with her son, Belen Guevara  There are 13 steps to enter, however, there is also a garage that has 3 steps to enter  She said that both sets of steps have rails  Pt's bedroom is on the second floor and there is a flight of steps to her bedroom  Pt said that she just takes one step at a time  She believes that she manages the stairs okay  She said that she uses a cane and a RW  She said that she uses the cane the most  Pt reported no D&A currently nor has a hx of it  Pt denied tobacco use  No MH concerns  Pt reported hx of VNA 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  Pt said that she uses [facility] in [place]  Pt reported that she has prescription coverage and has no issues with obtaining meds  She said that she takes her meds on her own and knows what meds are for  She reported that she can afford meds  She reported no POA  However, Shayla Sebastian is her primary   Pt drives  She said that Shayla Sebastian will transport home at discharge  CM reviewed discharge planning process including the following: identifying help at home, patient preference for discharge planning needs, pharmacy preference, and availability of treatment team to discuss questions or concerns patient and/or family may have regarding understanding medications and recognizing signs and symptoms once discharged  CM also encouraged patient to follow up with all recommended appointments after discharge  Patient advised of importance for patient and family to participate in managing patients medical well being  CM dept called [facility] and confirmed that pt was there for STR  DCP TBD

## 2021-08-05 NOTE — ASSESSMENT & PLAN NOTE
· In the setting of acute blood loss anemia, resolved  · 4 5 on Admission  · Fluid resuscitated with 2 L of NSS  · Repeat 1 5

## 2021-08-05 NOTE — ASSESSMENT & PLAN NOTE
· Chronically on coumadin for hx of Afib and PE  · INR 5 49 on admission  · Received Kcentra 3,500 units in ED  · Trend INR

## 2021-08-05 NOTE — ANESTHESIA POSTPROCEDURE EVALUATION
Post-Op Assessment Note    CV Status:  Stable  Pain Score: 0    Pain management: adequate     Mental Status:  Alert and awake   Hydration Status:  Stable   PONV Controlled:  Controlled   Airway Patency:  Patent      Post Op Vitals Reviewed: Yes      Staff: CRNA         No complications documented      BP (P) 92/70 (08/05/21 1348)    Temp (P) 98 6 °F (37 °C) (08/05/21 1348)    Pulse (P) 99 (08/05/21 1348)   Resp (P) 20 (08/05/21 1348)    SpO2 (P) 96 % (08/05/21 1348)

## 2021-08-05 NOTE — ED NOTES
Pt requiring uncrossed blood due to antibodies, blood unable to scan  Paper charting started with blood administration  Pt tolerating infusion well  Pt reports no symptoms  Vitals signs will continue to be monitored        Man Moura RN  08/05/21 2429

## 2021-08-05 NOTE — ANESTHESIA PREPROCEDURE EVALUATION
Procedure:  EGD    Relevant Problems   CARDIO   (+) Atrial fibrillation (HCC)   (+) Hyperlipidemia   (+) Hypertension      HEMATOLOGY   (+) hemmorrhagic shock secondary to GI bleed        Physical Exam    Airway    Mallampati score: II  TM Distance: >3 FB  Neck ROM: full     Dental       Cardiovascular  Cardiovascular exam normal    Pulmonary  Pulmonary exam normal     Other Findings        Anesthesia Plan  ASA Score- 2     Anesthesia Type- general with ASA Monitors  Additional Monitors:   Airway Plan: ETT  Plan Factors-Exercise tolerance (METS): >4 METS  Chart reviewed  Patient summary reviewed  Induction- intravenous  Postoperative Plan-     Informed Consent- Anesthetic plan and risks discussed with patient  I personally reviewed this patient with the CRNA  Discussed and agreed on the Anesthesia Plan with the CRNA  Kb Gee

## 2021-08-06 PROBLEM — E87.2 LACTIC ACIDOSIS: Status: RESOLVED | Noted: 2021-08-05 | Resolved: 2021-08-06

## 2021-08-06 PROBLEM — E87.20 LACTIC ACIDOSIS: Status: RESOLVED | Noted: 2021-08-05 | Resolved: 2021-08-06

## 2021-08-06 PROBLEM — Z79.01 ANTICOAGULATED: Status: ACTIVE | Noted: 2021-08-05

## 2021-08-06 LAB
ABO GROUP BLD BPU: NORMAL
ABO GROUP BLD BPU: NORMAL
ANION GAP SERPL CALCULATED.3IONS-SCNC: 10 MMOL/L (ref 4–13)
ANION GAP SERPL CALCULATED.3IONS-SCNC: 7 MMOL/L (ref 4–13)
BPU ID: NORMAL
BPU ID: NORMAL
BUN SERPL-MCNC: 21 MG/DL (ref 5–25)
BUN SERPL-MCNC: 23 MG/DL (ref 5–25)
CALCIUM SERPL-MCNC: 7.4 MG/DL (ref 8.3–10.1)
CALCIUM SERPL-MCNC: 7.7 MG/DL (ref 8.3–10.1)
CHLORIDE SERPL-SCNC: 106 MMOL/L (ref 100–108)
CHLORIDE SERPL-SCNC: 109 MMOL/L (ref 100–108)
CO2 SERPL-SCNC: 22 MMOL/L (ref 21–32)
CO2 SERPL-SCNC: 25 MMOL/L (ref 21–32)
CREAT SERPL-MCNC: 0.83 MG/DL (ref 0.6–1.3)
CREAT SERPL-MCNC: 1.01 MG/DL (ref 0.6–1.3)
CROSSMATCH: NORMAL
CROSSMATCH: NORMAL
ERYTHROCYTE [DISTWIDTH] IN BLOOD BY AUTOMATED COUNT: 16.3 % (ref 11.6–15.1)
GFR SERPL CREATININE-BSD FRML MDRD: 52 ML/MIN/1.73SQ M
GFR SERPL CREATININE-BSD FRML MDRD: 65 ML/MIN/1.73SQ M
GLUCOSE SERPL-MCNC: 158 MG/DL (ref 65–140)
GLUCOSE SERPL-MCNC: 92 MG/DL (ref 65–140)
HCT VFR BLD AUTO: 25.8 % (ref 34.8–46.1)
HGB BLD-MCNC: 8 G/DL (ref 11.5–15.4)
HGB BLD-MCNC: 8 G/DL (ref 11.5–15.4)
HGB BLD-MCNC: 8.8 G/DL (ref 11.5–15.4)
INR PPP: 1.29 (ref 0.84–1.19)
MAGNESIUM SERPL-MCNC: 1.9 MG/DL (ref 1.6–2.6)
MCH RBC QN AUTO: 28.6 PG (ref 26.8–34.3)
MCHC RBC AUTO-ENTMCNC: 31 G/DL (ref 31.4–37.4)
MCV RBC AUTO: 92 FL (ref 82–98)
PHOSPHATE SERPL-MCNC: 3.3 MG/DL (ref 2.3–4.1)
PLATELET # BLD AUTO: 224 THOUSANDS/UL (ref 149–390)
PMV BLD AUTO: 9.9 FL (ref 8.9–12.7)
POTASSIUM SERPL-SCNC: 4.3 MMOL/L (ref 3.5–5.3)
POTASSIUM SERPL-SCNC: 4.4 MMOL/L (ref 3.5–5.3)
PROTHROMBIN TIME: 15.9 SECONDS (ref 11.6–14.5)
RBC # BLD AUTO: 2.8 MILLION/UL (ref 3.81–5.12)
SODIUM SERPL-SCNC: 138 MMOL/L (ref 136–145)
SODIUM SERPL-SCNC: 141 MMOL/L (ref 136–145)
UNIT DISPENSE STATUS: NORMAL
UNIT DISPENSE STATUS: NORMAL
UNIT PRODUCT CODE: NORMAL
UNIT PRODUCT CODE: NORMAL
UNIT PRODUCT VOLUME: 350 ML
UNIT PRODUCT VOLUME: 350 ML
UNIT RH: NORMAL
UNIT RH: NORMAL
WBC # BLD AUTO: 10.98 THOUSAND/UL (ref 4.31–10.16)

## 2021-08-06 PROCEDURE — 83735 ASSAY OF MAGNESIUM: CPT | Performed by: NURSE PRACTITIONER

## 2021-08-06 PROCEDURE — 80048 BASIC METABOLIC PNL TOTAL CA: CPT | Performed by: PHYSICIAN ASSISTANT

## 2021-08-06 PROCEDURE — 84100 ASSAY OF PHOSPHORUS: CPT | Performed by: NURSE PRACTITIONER

## 2021-08-06 PROCEDURE — 85610 PROTHROMBIN TIME: CPT | Performed by: NURSE PRACTITIONER

## 2021-08-06 PROCEDURE — 99233 SBSQ HOSP IP/OBS HIGH 50: CPT | Performed by: ANESTHESIOLOGY

## 2021-08-06 PROCEDURE — 85018 HEMOGLOBIN: CPT | Performed by: PHYSICIAN ASSISTANT

## 2021-08-06 PROCEDURE — 85027 COMPLETE CBC AUTOMATED: CPT | Performed by: NURSE PRACTITIONER

## 2021-08-06 PROCEDURE — 80048 BASIC METABOLIC PNL TOTAL CA: CPT | Performed by: NURSE PRACTITIONER

## 2021-08-06 RX ORDER — WARFARIN SODIUM 7.5 MG/1
7.5 TABLET ORAL
Status: DISCONTINUED | OUTPATIENT
Start: 2021-08-06 | End: 2021-08-06

## 2021-08-06 RX ADMIN — PANTOPRAZOLE SODIUM 40 MG: 40 TABLET, DELAYED RELEASE ORAL at 07:49

## 2021-08-06 RX ADMIN — PRAVASTATIN SODIUM 20 MG: 20 TABLET ORAL at 15:52

## 2021-08-06 RX ADMIN — METOPROLOL SUCCINATE 50 MG: 50 TABLET, EXTENDED RELEASE ORAL at 07:49

## 2021-08-06 RX ADMIN — PANTOPRAZOLE SODIUM 40 MG: 40 TABLET, DELAYED RELEASE ORAL at 15:52

## 2021-08-06 NOTE — CASE MANAGEMENT
Case Management Progress Note    Patient name Otoniel Albrecht  Location /-34 MRN [id number]  : 1940 Date 2021       LOS (days): 1  Geometric Mean LOS (GMLOS) (days): 4 40  Days to GMLOS:3 1        BUNDLE:      OBJECTIVE:  Pt is a 78y o  year old , white or  [1], female with [facility] preference of [address] admitted on 1659  4:02 AM  Pt is admitted to -01 at [address] [place] [address] with complaints of Hemorrhagic shock due to gastrointestinal bleeding   Current admission status: Inpatient  Preferred Pharmacy:   [address] [place] [id number]  Phone: [phone] Fax: [phone]    Primary Care Provider: Yarley Corado DO    Primary Insurance: MEDICARE  Secondary Insurance:     PROGRESS NOTE:    CM spoke to patient about her dc  She was at [facility] since  when she was dc from [facility]  We discussed whether she was close to going home from the facility_ per patient she was  We discussed her choice for return, at this moment she is no sure  Patient has had her covid vaccines, however dreads having a COVID screen  She is going to think about return, recommends patient has therapy eval   PCP is Dr Kaitlin Blanco at [facility]  DC plan is dependent on functional evaluation_   ?  [facility] vs [facility]

## 2021-08-06 NOTE — ASSESSMENT & PLAN NOTE
· Secondary to GI bleeding as well as coagulopathy due to anticoagulation (coumadin)   · Son and patient report hx of GI bleed 15 years ago  · GI consulted and apprec recs  · Initial INR 5 49 - reversal with Kcentra per emergency medicine as well as with vitamin K  · Patient transfused with 1 units uncrossmatched; 1 U crossmatched blood resuscitation  During recent admission to [facility], patient was found to have heme-positive stool, as well as intramuscular hematoma  During that admission, the patient did receive 1 unit packed red blood cells, 4 units FFP and vitamin K for anticoagulant reversal   · Transfuse for Hbg >7 0  · EGD 8/5-3 cm hital hernia,bxs, 2 Jensen class 3 ulcers w/o bleeding  · Protonix drip DC'ed, continue protonix 40 mg PO BID while in hospital then Protonix 40 mg daily for 3 months  · Initially planned on restarting coumadin yesterday, but 2 dark BMs with ? Bright red blood; GI aware; Thankfully H/H and BUN has remained reassuring without significant drop in H/H and with unchanged hemodynamics  · Plan for ?  Colonoscopy/EGD on Monday if that persists, otherwise if concern for bleeding would need Tx to another center with GI services available over the weekend  · Would hold coumadin for now  · Strict cardiopulmonary monitoring

## 2021-08-06 NOTE — PROGRESS NOTES
[facility]  Progress Note - Akhil Pang 78 y o  female MRN: [id number]    Unit/Bed#: -01 Encounter: [id number]    Assessment/Plan:  Acute gastrointestinal hemorrhage  Peptic ulcer disease  Supratherapeutic INR  Acute anemia  -status post reversal of anticoagulation and blood resuscitation  -status post EGD on 08/05 which revealed 2 clean based ulcers in the duodenum(< 5% risk of rebleed)  -hemoglobin remained stable however still with melanic bowel movements and tachycardia  -Coumadin still on hold  -while her tachycardia and persistent melena are concerning, her EGD findings are unlikely to rebleed given clean based ulcers and stable hemoglobin making rebleed or continuous bleeding less likely and her continued bowel movements may be evidence of residual blood  -recommend continue to monitor serial H&H and signs of overt GI bleeding; continue to hold Coumadin, if patient with persistent overt GI bleeding and drop in hemoglobin over the weekend consider transfer for GI services over the weekend, however if remains stable can consider repeating EGD/colonoscopy on Monday  -PPI b i d  Subjective:   Patient seen examined  She states that she feels better  Denies abdominal pain, nausea, vomiting  Tolerating diet  Still with melanic bowel movements per    Objective:     Vitals: Blood pressure 124/76, pulse (!) 117, temperature 98 °F (36 7 °C), temperature source Oral, resp  rate 20, height 5' 4" (1 626 m), weight 92 1 kg (203 lb), SpO2 97 %  ,Body mass index is 34 84 kg/m²  Intake/Output Summary (Last 24 hours) at 8/6/2021 1550  Last data filed at 8/6/2021 1200  Gross per 24 hour   Intake 720 ml   Output 800 ml   Net -80 ml       Review of Systems: as per HPI    Physical Exam:     Physical Exam  Cardiovascular:      Rate and Rhythm: Tachycardia present  Pulmonary:      Effort: Pulmonary effort is normal    Abdominal:      Tenderness: There is no abdominal tenderness     Skin: Coloration: Skin is not jaundiced  Neurological:      Mental Status: She is alert and oriented to person, place, and time     Psychiatric:         Mood and Affect: Mood normal            Invasive Devices     Peripheral Intravenous Line            Peripheral IV 08/05/21 Left Hand 1 day    Peripheral IV 08/05/21 Right Antecubital 1 day          Drain            External Urinary Catheter <1 day                        CBC:   Lab Results   Component Value Date    WBC 10 98 (H) 08/06/2021    HGB 8 8 (L) 08/06/2021    HCT 25 8 (L) 08/06/2021    MCV 92 08/06/2021     08/06/2021    MCH 28 6 08/06/2021    MCHC 31 0 (L) 08/06/2021    RDW 16 3 (H) 08/06/2021    MPV 9 9 08/06/2021   ,   CMP:   Lab Results   Component Value Date    K 4 3 08/06/2021     08/06/2021    CO2 22 08/06/2021    BUN 21 08/06/2021    CREATININE 1 01 08/06/2021    CALCIUM 7 7 (L) 08/06/2021    EGFR 52 08/06/2021   ,   Lipase: No results found for: LIPASE,  PT/INR:   Lab Results   Component Value Date    INR 1 29 (H) 08/06/2021   ,   Troponin: No results found for: TROPONINI,   Magnesium: No components found for: MAG,   Phosphorous:   Lab Results   Component Value Date    PHOS 3 3 08/06/2021

## 2021-08-06 NOTE — ASSESSMENT & PLAN NOTE
· Chronically on coumadin for hx of Afib and PE  · INR 5 49 on admission  · Received Kcentra 3,500 units in ED  · Planned on starting coumadin yesterday but dark BM with ? BRB x2   H/H reassuring, hold coumadin for now  · Trend INR

## 2021-08-06 NOTE — ASSESSMENT & PLAN NOTE
· Patient reports use of Coumadin times approximately 20 years  · Patient is additionally rate controlled with Toprol XL 50 mg daily  · Holding coumadin still currently  · Currently rate controlled in the low 90's

## 2021-08-06 NOTE — PROGRESS NOTES
[address]  Progress Note - Abel Barriga 1940, 78 y o  female MRN: [id number]  Unit/Bed#: -01 Encounter: [id number]  Primary Care Provider: Taz Varma MD   Date and time admitted to hospital: 8/5/2021  4:02 AM    * hemmorrhagic shock secondary to GI bleed  Assessment & Plan  · Secondary to GI bleeding as well as coagulopathy due to anticoagulation (coumadin)   · Son and patient report hx of GI bleed 15 years ago  · GI consulted and apprec recs  · Initial INR 5 49 - reversal with Kcentra per emergency medicine as well as with vitamin K  · Patient transfused with 1 units uncross matched packed red blood cells-2 nd unit pending cross match of antibodies as well as fluid resuscitation  During recent admission to [facility], patient was found to have heme-positive stool, as well as intramuscular hematoma    During that admission, the patient did receive 1 unit packed red blood cells, 4 units FFP and vitamin K for anticoagulant reversal   · Transfuse for Hbg >7 0  · EGD 8/5-3 cm hital hernia,bxs, 2 Jensen class 3 ulcers w/o bleeding  · Protonix drip DC'ed, continue protonix 40 mg PO BID while in hospital then Protonix 40 mg daily for 3 months  · Coumadin currently held, will recheck INR and H/H this a m , if INR is therapeutic in H/H is stable will restart today  · Strict cardiopulmonary monitoring    Supratherapeutic INR  Assessment & Plan  · Chronically on coumadin for hx of Afib and PE  · INR 5 49 on admission  · Received Kcentra 3,500 units in ED  · Trend INR    Atrial fibrillation (HCC)  Assessment & Plan  · Patient reports use of Coumadin times approximately 20 years  · Patient is additionally rate controlled with Toprol XL 50 mg daily  · Anticoagulation is held at this time, will restart Coumadin this a m  if INR is therapeutic and H/H is stable  · Currently rate controlled in the low 90's      Lactic acidosis  Assessment & Plan  · In the setting of acute blood loss anemia, resolved  · 4 5 on Admission  · Fluid resuscitated with 2 L of NSS  · Repeat 1 5    Hyperlipidemia  Assessment & Plan  · Will substitute home pravachol for Lipitor while admitted    Hypertension  Assessment & Plan  · Currently holding home antihypertensives    SIRS (systemic inflammatory response syndrome) (HCC)  Assessment & Plan  · POA AEB tachycardia, lactic acidosis, and leukocytosis  · Most likely secondary to acute anemia, not concerning at this time for infectious etiology  · No indication for antibiotics at this time          ----------------------------------------------------------------------------------------  HPI/24hr events:  Patient responded appropriately to 2 units packed red blood cells  INR reversed with vitamin K and Kcentra  Patient's EGD yesterday revealed hiatal hernia and andrew class 3 ulcers, not actively bleeding  Protonix drip discontinue, patient currently on Protonix p o  Patient has remained hemodynamically stable overnight  Patient does not meet criteria for referral to the ICU Follow-Up Clinic; referral has not been made  Disposition: Transfer to Med-Surg   Code Status: Level 1 - Full Code  ---------------------------------------------------------------------------------------  SUBJECTIVE  Patient is lying in bed in no acute distress  She continues to appear pale  She is alert, interactive and appropriate      Review of Systems  A 12 point review systems was completed and is negative with exception of I still feel tired  ---------------------------------------------------------------------------------------  OBJECTIVE    Vitals   Vitals:    21 1910 21 2300   BP:  105/60 116/55 125/76   BP Location:  Left arm Left arm Left arm   Pulse:  96 97 105   Resp:  (!) 26 (!) 26 (!) 27   Temp: 98 8 °F (37 1 °C)   97 8 °F (36 6 °C)   TempSrc: Temporal   Oral   SpO2:  97% 97% 100%   Weight:       Height:         Temp (24hrs), Av 2 °F (36 8 °C), Min:97 7 °F (36 5 °C), Max:98 8 °F (37 1 °C)  Current: Temperature: 97 8 °F (36 6 °C)          Respiratory:  SpO2: SpO2: 100 %, SpO2 Activity: SpO2 Activity: At Rest, SpO2 Device: O2 Device: Nasal cannula  Nasal Cannula O2 Flow Rate (L/min): 2 L/min    Invasive/non-invasive ventilation settings   Respiratory    Lab Data (Last 4 hours)    None         O2/Vent Data (Last 4 hours)    None                Physical Exam  Vitals and nursing note reviewed  Constitutional:       General: She is not in acute distress  Appearance: She is not ill-appearing or toxic-appearing  HENT:      Head: Normocephalic and atraumatic  Nose: Nose normal       Mouth/Throat:      Mouth: Mucous membranes are moist    Eyes:      General:         Right eye: No discharge  Left eye: No discharge  Extraocular Movements: Extraocular movements intact  Conjunctiva/sclera: Conjunctivae normal       Pupils: Pupils are equal, round, and reactive to light  Comments: Conjunctival pallor noted   Cardiovascular:      Rate and Rhythm: Regular rhythm  Tachycardia present  Pulmonary:      Effort: Pulmonary effort is normal  No respiratory distress  Abdominal:      General: Abdomen is flat  Palpations: Abdomen is soft  Musculoskeletal:      Cervical back: Normal range of motion and neck supple  Right lower leg: Edema present  Left lower leg: Edema present  Skin:     Coloration: Skin is pale  Findings: Bruising present  Neurological:      Mental Status: She is alert  Mental status is at baseline     Psychiatric:         Mood and Affect: Mood normal          Behavior: Behavior normal          Laboratory and Diagnostics:  Results from last 7 days   Lab Units 08/05/21  1758 08/05/21  0948 08/05/21  0407   WBC Thousand/uL  --  17 18* 18 27*   HEMOGLOBIN g/dL 8 4* 7 8* 7 5*   HEMATOCRIT % 26 9* 25 4* 24 8*   PLATELETS Thousands/uL  --  275 345   NEUTROS PCT %  --  73 83*   MONOS PCT %  --  9 5     Results from last 7 days   Lab Units 08/05/21  0407   SODIUM mmol/L 141   POTASSIUM mmol/L 4 7   CHLORIDE mmol/L 105   CO2 mmol/L 21   ANION GAP mmol/L 15*   BUN mg/dL 34*   CREATININE mg/dL 1 16   CALCIUM mg/dL 6 9*   GLUCOSE RANDOM mg/dL 165*   ALT U/L 33   AST U/L 37   ALK PHOS U/L 77   ALBUMIN g/dL 2 1*   TOTAL BILIRUBIN mg/dL 1 08*          Results from last 7 days   Lab Units 08/05/21  0948 08/05/21  0407   INR  1 67* 5 49*   PTT seconds  --  47*      Results from last 7 days   Lab Units 08/05/21  0407   TROPONIN I ng/mL <0 02     Results from last 7 days   Lab Units 08/05/21  0641 08/05/21  0407   LACTIC ACID mmol/L 1 5 4 1*     ABG:    VBG:          Micro  Results from last 7 days   Lab Units 08/05/21  0440   BLOOD CULTURE  Received in Microbiology Lab  Culture in Progress  Received in Microbiology Lab  Culture in Progress  EKG: n/a  Imaging: I have personally reviewed pertinent reports  and I have personally reviewed pertinent films in PACS    Intake and Output  I/O       08/04 0701 - 08/05 0700 08/05 0701 - 08/06 0700    P  O   240    I V  (mL/kg)  225 2 (2 4)    Blood  350    IV Piggyback  50    Total Intake(mL/kg)  865 2 (9 4)    Net  +865 2          Unmeasured Urine Occurrence  2 x          Height and Weights   Height: 5' 4" (162 6 cm)  IBW (Ideal Body Weight): 54 7 kg  Body mass index is 34 84 kg/m²  Weight (last 2 days)     Date/Time   Weight    08/05/21 1250   92 1 (203)    08/05/21 0855   92 2 (203 26)    08/05/21 0403   107 (234 79)                Nutrition       Diet Orders   (From admission, onward)             Start     Ordered    08/05/21 1536  Diet Clear Liquid  Diet effective now     Question Answer Comment   Diet Type Clear Liquid    RD to adjust diet per protocol?  Yes        08/05/21 1535                  Active Medications  Scheduled Meds:  Current Facility-Administered Medications   Medication Dose Route Frequency Provider Last Rate    acetaminophen  650 mg Oral Q6H PRN CHARLI Agustin      metoprolol succinate  50 mg Oral Daily CHARLI Nation      pantoprazole  40 mg Oral BID AC CHARLI Nation      pravastatin  20 mg Oral Daily With Dinner CHARLI Nation       Continuous Infusions:     PRN Meds:   acetaminophen, 650 mg, Q6H PRN        Invasive Devices Review  Invasive Devices     Peripheral Intravenous Line            Peripheral IV 08/05/21 Right Antecubital 1 day    Peripheral IV 08/05/21 Left Hand <1 day          Drain            External Urinary Catheter <1 day                Rationale for remaining devices:  Administration of medications  ---------------------------------------------------------------------------------------  Advance Directive and Living Will:      Power of :    POLST:    ---------------------------------------------------------------------------------------  Care Time Delivered:         Saab PA-C      Portions of the record may have been created with voice recognition software  Occasional wrong word or "sound a like" substitutions may have occurred due to the inherent limitations of voice recognition software    Read the chart carefully and recognize, using context, where substitutions have occurred

## 2021-08-06 NOTE — PLAN OF CARE
Problem: Potential for Falls  Goal: Patient will remain free of falls  Description: INTERVENTIONS:  - Educate patient/family on patient safety including physical limitations  - Instruct patient to call for assistance with activity   - Consult OT/PT to assist with strengthening/mobility   - Keep Call bell within reach  - Keep bed low and locked with side rails adjusted as appropriate  - Keep care items and personal belongings within reach  - Initiate and maintain comfort rounds  - Make Fall Risk Sign visible to staff  - Offer Toileting every 2 Hours, in advance of nee- Initiate/Maintain bed and/or chair   - Apply yellow socks and bracelet for high fall risk patients  - Consider moving patient to room near nurses station  Outcome: Progressing     Problem: CARDIOVASCULAR - ADULT  Goal: Maintains optimal cardiac output and hemodynamic stability  Description: INTERVENTIONS:  - Monitor I/O, vital signs and rhythm  - Monitor for S/S and trends of decreased cardiac output  - Administer and titrate ordered vasoactive medications to optimize hemodynamic stability  - Assess quality of pulses, skin color and temperature  - Assess for signs of decreased coronary artery perfusion  - Instruct patient to report change in severity of symptoms  Outcome: Progressing  Goal: Absence of cardiac dysrhythmias or at baseline rhythm  Description: INTERVENTIONS:  - Continuous cardiac monitoring, vital signs, obtain 12 lead EKG if ordered  - Administer antiarrhythmic and heart rate control medications as ordered  - Monitor electrolytes and administer replacement therapy as ordered  Outcome: Progressing     Problem: GASTROINTESTINAL - ADULT  Goal: Minimal or absence of nausea and/or vomiting  Description: INTERVENTIONS:  - Administer IV fluids if ordered to ensure adequate hydration  - Maintain NPO status until nausea and vomiting are resolved  - Nasogastric tube if ordered  - Administer ordered antiemetic medications as needed  - Provide nonpharmacologic comfort measures as appropriate  - Advance diet as tolerated, if ordered  - Consider nutrition services referral to assist patient with adequate nutrition and appropriate food choices  Outcome: Progressing  Goal: Maintains or returns to baseline bowel function  Description: INTERVENTIONS:  - Assess bowel function  - Encourage oral fluids to ensure adequate hydration  - Administer IV fluids if ordered to ensure adequate hydration  - Administer ordered medications as needed  - Encourage mobilization and activity  - Consider nutritional services referral to assist patient with adequate nutrition and appropriate food choices  Outcome: Not Progressing  Goal: Maintains adequate nutritional intake  Description: INTERVENTIONS:  - Monitor percentage of each meal consumed  - Identify factors contributing to decreased intake, treat as appropriate  - Assist with meals as needed  - Monitor I&O, weight, and lab values if indicated  - Obtain nutrition services referral as needed  Outcome: Progressing  Goal: Establish and maintain optimal ostomy function  Description: INTERVENTIONS:  - Assess bowel function  - Encourage oral fluids to ensure adequate hydration  - Administer IV fluids if ordered to ensure adequate hydration   - Administer ordered medications as needed  - Encourage mobilization and activity  - Nutrition services referral to assist patient with appropriate food choices  - Assess stoma site  - Consider wound care consult   Outcome: Completed  Goal: Oral mucous membranes remain intact  Description: INTERVENTIONS  - Assess oral mucosa and hygiene practices  - Implement preventative oral hygiene regimen  - Implement oral medicated treatments as ordered  - Initiate Nutrition services referral as needed  Outcome: Progressing     Problem: METABOLIC, FLUID AND ELECTROLYTES - ADULT  Goal: Electrolytes maintained within normal limits  Description: INTERVENTIONS:  - Monitor labs and assess patient for signs and symptoms of electrolyte imbalances  - Administer electrolyte replacement as ordered  - Monitor response to electrolyte replacements, including repeat lab results as appropriate  - Instruct patient on fluid and nutrition as appropriate  Outcome: Progressing  Goal: Fluid balance maintained  Description: INTERVENTIONS:  - Monitor labs   - Monitor I/O and WT  - Instruct patient on fluid and nutrition as appropriate  - Assess for signs & symptoms of volume excess or deficit  Outcome: Progressing  Goal: Glucose maintained within target range  Description: INTERVENTIONS:  - Monitor Blood Glucose as ordered  - Assess for signs and symptoms of hyperglycemia and hypoglycemia  - Administer ordered medications to maintain glucose within target range  - Assess nutritional intake and initiate nutrition service referral as needed  Outcome: Progressing

## 2021-08-07 LAB
ANION GAP SERPL CALCULATED.3IONS-SCNC: 7 MMOL/L (ref 4–13)
BUN SERPL-MCNC: 15 MG/DL (ref 5–25)
CA-I BLD-SCNC: 1.08 MMOL/L (ref 1.12–1.32)
CALCIUM SERPL-MCNC: 7.4 MG/DL (ref 8.3–10.1)
CHLORIDE SERPL-SCNC: 107 MMOL/L (ref 100–108)
CO2 SERPL-SCNC: 27 MMOL/L (ref 21–32)
CREAT SERPL-MCNC: 0.85 MG/DL (ref 0.6–1.3)
ERYTHROCYTE [DISTWIDTH] IN BLOOD BY AUTOMATED COUNT: 16.6 % (ref 11.6–15.1)
GFR SERPL CREATININE-BSD FRML MDRD: 64 ML/MIN/1.73SQ M
GLUCOSE SERPL-MCNC: 93 MG/DL (ref 65–140)
HCT VFR BLD AUTO: 24.1 % (ref 34.8–46.1)
HGB BLD-MCNC: 7.7 G/DL (ref 11.5–15.4)
INR PPP: 1.82 (ref 0.84–1.19)
MAGNESIUM SERPL-MCNC: 1.7 MG/DL (ref 1.6–2.6)
MCH RBC QN AUTO: 29.3 PG (ref 26.8–34.3)
MCHC RBC AUTO-ENTMCNC: 32 G/DL (ref 31.4–37.4)
MCV RBC AUTO: 92 FL (ref 82–98)
PHOSPHATE SERPL-MCNC: 3.2 MG/DL (ref 2.3–4.1)
PLATELET # BLD AUTO: 203 THOUSANDS/UL (ref 149–390)
PMV BLD AUTO: 9.8 FL (ref 8.9–12.7)
POTASSIUM SERPL-SCNC: 4 MMOL/L (ref 3.5–5.3)
PROTHROMBIN TIME: 20.8 SECONDS (ref 11.6–14.5)
RBC # BLD AUTO: 2.63 MILLION/UL (ref 3.81–5.12)
SODIUM SERPL-SCNC: 141 MMOL/L (ref 136–145)
WBC # BLD AUTO: 10.4 THOUSAND/UL (ref 4.31–10.16)

## 2021-08-07 PROCEDURE — 80048 BASIC METABOLIC PNL TOTAL CA: CPT | Performed by: PHYSICIAN ASSISTANT

## 2021-08-07 PROCEDURE — 85027 COMPLETE CBC AUTOMATED: CPT | Performed by: PHYSICIAN ASSISTANT

## 2021-08-07 PROCEDURE — 82330 ASSAY OF CALCIUM: CPT | Performed by: PHYSICIAN ASSISTANT

## 2021-08-07 PROCEDURE — 99232 SBSQ HOSP IP/OBS MODERATE 35: CPT | Performed by: INTERNAL MEDICINE

## 2021-08-07 PROCEDURE — 83735 ASSAY OF MAGNESIUM: CPT | Performed by: PHYSICIAN ASSISTANT

## 2021-08-07 PROCEDURE — 85610 PROTHROMBIN TIME: CPT | Performed by: PHYSICIAN ASSISTANT

## 2021-08-07 PROCEDURE — 84100 ASSAY OF PHOSPHORUS: CPT | Performed by: PHYSICIAN ASSISTANT

## 2021-08-07 RX ORDER — ONDANSETRON 2 MG/ML
4 INJECTION INTRAMUSCULAR; INTRAVENOUS EVERY 4 HOURS PRN
Status: DISCONTINUED | OUTPATIENT
Start: 2021-08-07 | End: 2021-08-13 | Stop reason: HOSPADM

## 2021-08-07 RX ADMIN — METOPROLOL SUCCINATE 50 MG: 50 TABLET, EXTENDED RELEASE ORAL at 09:24

## 2021-08-07 RX ADMIN — PANTOPRAZOLE SODIUM 40 MG: 40 TABLET, DELAYED RELEASE ORAL at 07:24

## 2021-08-07 RX ADMIN — PRAVASTATIN SODIUM 20 MG: 20 TABLET ORAL at 17:02

## 2021-08-07 RX ADMIN — PANTOPRAZOLE SODIUM 40 MG: 40 TABLET, DELAYED RELEASE ORAL at 16:02

## 2021-08-07 NOTE — ASSESSMENT & PLAN NOTE
· Melanotic stool on admission with acute blood loss anemia requiring 2 units PRBC  · Status post EGD with nonbleeding duodenal ulcers    · GI following may need colonoscopy if further drop in hemoglobin or bleeding    Results from last 7 days   Lab Units 08/07/21  0547 08/06/21  1954 08/06/21  1422 08/06/21  0559 08/05/21  1758 08/05/21  0948 08/05/21  0407   HEMOGLOBIN g/dL 7 7* 8 0* 8 8* 8 0* 8 4* 7 8* 7 5*

## 2021-08-07 NOTE — ASSESSMENT & PLAN NOTE
· Continue metoprolol    INR supratherapeutic on admission requiring vitamin K and Kcentra  · Restart anticoagulation when cleared by GI    Results from last 7 days   Lab Units 08/08/21  0447 08/07/21  0547 08/06/21  0559 08/05/21  0948 08/05/21  0407   INR  1 94* 1 82* 1 29* 1 67* 5 49*

## 2021-08-07 NOTE — ASSESSMENT & PLAN NOTE
· Melanotic stool on admission with acute blood loss anemia requiring 2 units PRBC  · Status post EGD with nonbleeding duodenal ulcers    · Reaching out to GI regarding timing of colonoscopy    Results from last 7 days   Lab Units 08/08/21  0447 08/07/21  0547 08/06/21  1954 08/06/21  1422 08/06/21  0559 08/05/21  1758 08/05/21  0948 08/05/21  0407   HEMOGLOBIN g/dL 7 6* 7 7* 8 0* 8 8* 8 0* 8 4* 7 8* 7 5*

## 2021-08-07 NOTE — PROGRESS NOTES
[address] Tonja Tellez  Progress Note [facility] 1940, 78 y o  female MRN: [id number]  Unit/Bed#: -01 Encounter: [id number]  Primary Care Provider: Imelda Murrell DO   Date and time admitted to hospital: 8/5/2021  4:02 AM    * Hemorrhagic shock due to gastrointestinal bleeding  Assessment & Plan  · Melanotic stool on admission with acute blood loss anemia requiring 2 units PRBC  · Status post EGD with nonbleeding duodenal ulcers  · GI following may need colonoscopy if further drop in hemoglobin or bleeding    Results from last 7 days   Lab Units 08/07/21  0547 08/06/21  1954 08/06/21  1422 08/06/21  0559 08/05/21  1758 08/05/21  0948 08/05/21  0407   HEMOGLOBIN g/dL 7 7* 8 0* 8 8* 8 0* 8 4* 7 8* 7 5*       Hyperlipidemia  Assessment & Plan  · Simvastatin substituted with pravastatin based on hospital formulary    Atrial fibrillation ([facility] [address] )  Assessment & Plan  · Continue metoprolol  INR supratherapeutic on admission requiring vitamin K and Kcentra    Results from last 7 days   Lab Units 08/07/21  0547 08/06/21  0559 08/05/21  0948 08/05/21  0407   INR  1 82* 1 29* 1 67* 5 49*       SIRS (systemic inflammatory response syndrome) (HCC)  Assessment & Plan  · Secondary to GI bleeding and hypotension  No infection    Lactic acidosis-resolved as of 8/6/2021  Assessment & Plan  · Secondary to GI bleeding/hypotension  Resolved    Results from last 7 days   Lab Units 08/05/21  0641 08/05/21  0407   LACTIC ACID mmol/L 1 5 4 1*       VTE Pharmacologic Prophylaxis: VTE Score: 7 High Risk (Score >/= 5) - Pharmacological DVT Prophylaxis Contraindicated  Sequential Compression Devices Ordered  Patient Centered Rounds: I have performed bedside rounds with nursing staff today    Discussions with Specialists or Other Care Team Provider:  Case management    Education and Discussions with Family / Patient:  Attempted to call son however mailbox is full a cannot leave message [phone]    Time Spent for Care: 25 mins  More than 50% of total time spent on counseling and coordination of care as described above  Current Length of Stay: 2 day(s)  Current Patient Status: Inpatient   Certification Statement: The patient will continue to require additional inpatient hospital stay due to GI bleeding  Discharge Plan / Estimated Discharge Date: Anticipate discharge in 48-72 hrs to home  Code Status: Level 1 - Full Code      Subjective:   Patient seen and examined  No new complaints  No further bleeding  Objective:   Vitals: Blood pressure 112/59, pulse 99, temperature 97 8 °F (36 6 °C), temperature source Oral, resp  rate 18, height 5' 4" (1 626 m), weight 93 7 kg (206 lb 9 1 oz), SpO2 95 %  Physical Exam  Vitals reviewed  Constitutional:       General: She is not in acute distress  Appearance: Normal appearance  HENT:      Head: Atraumatic  Eyes:      General: No scleral icterus  Cardiovascular:      Rate and Rhythm: Rhythm irregular  Heart sounds: Normal heart sounds  Pulmonary:      Breath sounds: Decreased breath sounds present  No wheezing  Abdominal:      General: Bowel sounds are normal       Palpations: Abdomen is soft  Tenderness: There is no guarding or rebound  Musculoskeletal:         General: No swelling  Skin:     General: Skin is warm  Neurological:      Mental Status: She is alert and oriented to person, place, and time  Mental status is at baseline     Psychiatric:         Mood and Affect: Mood normal        Additional Data:   Labs:  Results from last 7 days   Lab Units 08/07/21  0547 08/06/21  1954 08/06/21  1422 08/06/21  0559 08/05/21  1758 08/05/21  0948 08/05/21  0407   WBC Thousand/uL 10 40*  --   --  10 98*  --  17 18* 18 27*   HEMOGLOBIN g/dL 7 7* 8 0* 8 8* 8 0* 8 4* 7 8* 7 5*   HEMATOCRIT % 24 1*  --   --  25 8* 26 9* 25 4* 24 8*   MCV fL 92  --   --  92  --  91 93   PLATELETS Thousands/uL 203  --   --  224  --  275 345   INR  1 82*  --   -- 1 29*  --  1 67* 5 49*     Results from last 7 days   Lab Units 08/07/21  0547 08/06/21  1422 08/06/21  0559 08/05/21  0407   SODIUM mmol/L 141 138 141 141   POTASSIUM mmol/L 4 0 4 3 4 4 4 7   CHLORIDE mmol/L 107 106 109* 105   CO2 mmol/L 27 22 25 21   ANION GAP mmol/L 7 10 7 15*   BUN mg/dL 15 21 23 34*   CREATININE mg/dL 0 85 1 01 0 83 1 16   CALCIUM mg/dL 7 4* 7 7* 7 4* 6 9*   ALBUMIN g/dL  --   --   --  2 1*   TOTAL BILIRUBIN mg/dL  --   --   --  1 08*   ALK PHOS U/L  --   --   --  77   ALT U/L  --   --   --  33   AST U/L  --   --   --  37   EGFR ml/min/1 73sq m 64 52 65 44   GLUCOSE RANDOM mg/dL 93 158* 92 165*     Results from last 7 days   Lab Units 08/07/21  0547 08/06/21  0559   MAGNESIUM mg/dL 1 7 1 9   PHOSPHORUS mg/dL 3 2 3 3     Results from last 7 days   Lab Units 08/05/21  0407   TROPONIN I ng/mL <0 02          Results from last 7 days   Lab Units 08/05/21  0641 08/05/21  0407   LACTIC ACID mmol/L 1 5 4 1*                 * I Have Reviewed All Lab Data Listed Above  Cultures:   Results from last 7 days   Lab Units 08/05/21  0440   BLOOD CULTURE  No Growth at 48 hrs  No Growth at 48 hrs  Lines/Drains:  Invasive Devices     Peripheral Intravenous Line            Peripheral IV 08/05/21 Left Hand 2 days    Peripheral IV 08/05/21 Right Antecubital 2 days          Drain            External Urinary Catheter 1 day              Telemetry:      Imaging:  Imaging Reports Reviewed Today Include:   EGD    Addendum Date: 8/5/2021    [employer] [facility]  [facility] [address] [place] [id number] [phone] DATE OF SERVICE: 8/05/21 PHYSICIAN(S):  - Attending Physician INDICATION: Acute blood loss anemia, Gastrointestinal hemorrhage, unspecified gastrointestinal hemorrhage type POST-OP DIAGNOSIS: See the impression below  PREPROCEDURE: Informed consent was obtained for the procedure, including sedation    Risks of perforation, hemorrhage, adverse drug reaction and aspiration were discussed  The patient was placed in the left lateral decubitus position  Patient was explained about the risks and benefits of the procedure  Risks including but not limited to bleeding, infection, and perforation were explained in detail  Also explained about less than 100% sensitivity with the exam and other alternatives  DETAILS OF PROCEDURE: Patient was taken to the procedure room where a time out was performed to confirm correct patient and correct procedure  The patient underwent monitored anesthesia care, which was administered by an anesthesia professional  The patient's blood pressure, heart rate, level of consciousness, respirations, oxygen, ECG and ETCO2 were monitored throughout the procedure  The scope was advanced to the third part of the duodenum  Retroflexion was performed in the incisura  The patient experienced no blood loss  The procedure was not difficult  The patient tolerated the procedure well  There were no apparent complications  ANESTHESIA INFORMATION: ASA: II Anesthesia Type: General MEDICATIONS: No administrations occurring from 1317 to 1342 on 08/05/21 FINDINGS: The esophagus appeared normal  Z-line is 40 cm from the incisors  Sliding hiatal hernia (type I hiatal hernia) without Whitt Corby lesions present - GE junction 40 cm from the incisors, diaphragmatic impression 43 cm from the incisors Performed biopsies to rule out H  pylori in the stomach Multiple 2 superficial, round ulcers in the 1st part of the duodenum with clean base (Jensen III)  2 Jensen class 3 ulcers seen at duodenal sweep with clean ulcer base ranging from 8-12 mm in size  No evidence of active bleeding   SPECIMENS: ID Type Source Tests Collected by Time Destination 1 : gastric Bx r/o H  pylori taken using cold forcep Tissue Stomach TISSUE EXAM Kunal Mccabe MD 8/5/2021  1:36 PM  IMPRESSION: No active bleeding Normal esophagus 3 cm hiatal hernia without Preet's erosions otherwise normal stomach on direct and retroflexed views; random biopsies taken to rule out H pylori 2 Jensen Class 3 ulcers(clean based ulcers) without evidence of active bleeding likely the source of her bleed RECOMMENDATION: Await pathology results Return to floor for ongoing care Clear liquid diet Discontinue Protonix continuous infusion and change to oral PPI twice daily while in hospital and reduced to once daily thereafter for 3 months Continue monitor hemoglobin and signs of overt GI bleeding If hemoglobin remains stable okay to resume Coumadin tomorrow Updated son Fabian Quintanilla at [phone] at 2pm,      Result Date: 8/5/2021  Impression: No active bleeding Normal esophagus 3 cm hiatal hernia without Preet's erosions otherwise normal stomach on direct and retroflexed views; random biopsies taken to rule out H pylori 2 Jensen Class 3 ulcers(clean based ulcers) without evidence of active bleeding likely the source of her bleed RECOMMENDATION: Await pathology results Return to floor for ongoing care Clear liquid diet Discontinue Protonix continuous infusion and change to oral PPI twice daily while in hospital and reduced to once daily thereafter for 3 months Continue monitor hemoglobin and signs of overt GI bleeding If hemoglobin remains stable okay to resume Coumadin tomorrow     XR chest 1 view portable    Result Date: 8/5/2021  Impression: No acute cardiopulmonary disease  Workstation performed: [id number]     Scheduled Meds:  Current Facility-Administered Medications   Medication Dose Route Frequency Provider Last Rate    acetaminophen  650 mg Oral Q6H PRN Cesar Fine PA-C      metoprolol succinate  50 mg Oral Daily Nitish Jauregui      pantoprazole  40 mg Oral BID AC Cesar Fine PA-C      pravastatin  20 mg Oral Daily With SHASHI Klein DO [address] Internal Medicine  Hospitalist    ** Please Note: This note has been constructed using a voice recognition system   **

## 2021-08-07 NOTE — ASSESSMENT & PLAN NOTE
· Secondary to GI bleeding/hypotension    Resolved    Results from last 7 days   Lab Units 08/05/21  0641 08/05/21  0407   LACTIC ACID mmol/L 1 5 4 1*

## 2021-08-07 NOTE — ASSESSMENT & PLAN NOTE
· Continue metoprolol    INR supratherapeutic on admission requiring vitamin K and Kcentra    Results from last 7 days   Lab Units 08/07/21  0547 08/06/21  0559 08/05/21  0948 08/05/21  0407   INR  1 82* 1 29* 1 67* 5 49*

## 2021-08-07 NOTE — PLAN OF CARE
Problem: CARDIOVASCULAR - ADULT  Goal: Maintains optimal cardiac output and hemodynamic stability  Description: INTERVENTIONS:  - Monitor I/O, vital signs and rhythm  - Monitor for S/S and trends of decreased cardiac output  - Administer and titrate ordered vasoactive medications to optimize hemodynamic stability  - Assess quality of pulses, skin color and temperature  - Assess for signs of decreased coronary artery perfusion  - Instruct patient to report change in severity of symptoms  Outcome: Progressing  Goal: Absence of cardiac dysrhythmias or at baseline rhythm  Description: INTERVENTIONS:  - Continuous cardiac monitoring, vital signs, obtain 12 lead EKG if ordered  - Administer antiarrhythmic and heart rate control medications as ordered  - Monitor electrolytes and administer replacement therapy as ordered  Outcome: Progressing     Problem: GASTROINTESTINAL - ADULT  Goal: Minimal or absence of nausea and/or vomiting  Description: INTERVENTIONS:  - Administer IV fluids if ordered to ensure adequate hydration  - Maintain NPO status until nausea and vomiting are resolved  - Nasogastric tube if ordered  - Administer ordered antiemetic medications as needed  - Provide nonpharmacologic comfort measures as appropriate  - Advance diet as tolerated, if ordered  - Consider nutrition services referral to assist patient with adequate nutrition and appropriate food choices  Outcome: Progressing  Goal: Maintains or returns to baseline bowel function  Description: INTERVENTIONS:  - Assess bowel function  - Encourage oral fluids to ensure adequate hydration  - Administer IV fluids if ordered to ensure adequate hydration  - Administer ordered medications as needed  - Encourage mobilization and activity  - Consider nutritional services referral to assist patient with adequate nutrition and appropriate food choices  Outcome: Progressing  Goal: Maintains adequate nutritional intake  Description: INTERVENTIONS:  - Monitor percentage of each meal consumed  - Identify factors contributing to decreased intake, treat as appropriate  - Assist with meals as needed  - Monitor I&O, weight, and lab values if indicated  - Obtain nutrition services referral as needed  Outcome: Progressing  Goal: Oral mucous membranes remain intact  Description: INTERVENTIONS  - Assess oral mucosa and hygiene practices  - Implement preventative oral hygiene regimen  - Implement oral medicated treatments as ordered  - Initiate Nutrition services referral as needed  Outcome: Progressing     Problem: METABOLIC, FLUID AND ELECTROLYTES - ADULT  Goal: Electrolytes maintained within normal limits  Description: INTERVENTIONS:  - Monitor labs and assess patient for signs and symptoms of electrolyte imbalances  - Administer electrolyte replacement as ordered  - Monitor response to electrolyte replacements, including repeat lab results as appropriate  - Instruct patient on fluid and nutrition as appropriate  Outcome: Progressing  Goal: Fluid balance maintained  Description: INTERVENTIONS:  - Monitor labs   - Monitor I/O and WT  - Instruct patient on fluid and nutrition as appropriate  - Assess for signs & symptoms of volume excess or deficit  Outcome: Progressing  Goal: Glucose maintained within target range  Description: INTERVENTIONS:  - Monitor Blood Glucose as ordered  - Assess for signs and symptoms of hyperglycemia and hypoglycemia  - Administer ordered medications to maintain glucose within target range  - Assess nutritional intake and initiate nutrition service referral as needed  Outcome: Progressing

## 2021-08-08 LAB
ERYTHROCYTE [DISTWIDTH] IN BLOOD BY AUTOMATED COUNT: 17.4 % (ref 11.6–15.1)
HCT VFR BLD AUTO: 24.8 % (ref 34.8–46.1)
HGB BLD-MCNC: 7.6 G/DL (ref 11.5–15.4)
INR PPP: 1.94 (ref 0.84–1.19)
MCH RBC QN AUTO: 28.3 PG (ref 26.8–34.3)
MCHC RBC AUTO-ENTMCNC: 30.6 G/DL (ref 31.4–37.4)
MCV RBC AUTO: 92 FL (ref 82–98)
PLATELET # BLD AUTO: 187 THOUSANDS/UL (ref 149–390)
PMV BLD AUTO: 9.9 FL (ref 8.9–12.7)
PROTHROMBIN TIME: 21.7 SECONDS (ref 11.6–14.5)
RBC # BLD AUTO: 2.69 MILLION/UL (ref 3.81–5.12)
WBC # BLD AUTO: 8.48 THOUSAND/UL (ref 4.31–10.16)

## 2021-08-08 PROCEDURE — 99232 SBSQ HOSP IP/OBS MODERATE 35: CPT | Performed by: INTERNAL MEDICINE

## 2021-08-08 PROCEDURE — 85610 PROTHROMBIN TIME: CPT | Performed by: INTERNAL MEDICINE

## 2021-08-08 PROCEDURE — 85027 COMPLETE CBC AUTOMATED: CPT | Performed by: INTERNAL MEDICINE

## 2021-08-08 RX ADMIN — METOPROLOL SUCCINATE 50 MG: 50 TABLET, EXTENDED RELEASE ORAL at 09:03

## 2021-08-08 RX ADMIN — PANTOPRAZOLE SODIUM 40 MG: 40 TABLET, DELAYED RELEASE ORAL at 09:03

## 2021-08-08 RX ADMIN — PRAVASTATIN SODIUM 20 MG: 20 TABLET ORAL at 17:23

## 2021-08-08 RX ADMIN — PANTOPRAZOLE SODIUM 40 MG: 40 TABLET, DELAYED RELEASE ORAL at 16:23

## 2021-08-08 NOTE — PROGRESS NOTES
114 Damire Geoff  Progress Note [facility] 1940, 78 y o  female MRN: [id number]  Unit/Bed#: -01 Encounter: [id number]  Primary Care Provider: Lewis Perez DO   Date and time admitted to hospital: 8/5/2021  4:02 AM    * Hemorrhagic shock due to gastrointestinal bleeding  Assessment & Plan  · Melanotic stool on admission with acute blood loss anemia requiring 2 units PRBC  · Status post EGD with nonbleeding duodenal ulcers  · Reaching out to GI regarding timing of colonoscopy    Results from last 7 days   Lab Units 08/08/21  0447 08/07/21  0547 08/06/21  1954 08/06/21  1422 08/06/21  0559 08/05/21  1758 08/05/21  0948 08/05/21  0407   HEMOGLOBIN g/dL 7 6* 7 7* 8 0* 8 8* 8 0* 8 4* 7 8* 7 5*       Hyperlipidemia  Assessment & Plan  · Simvastatin substituted with pravastatin based on hospital formulary    Atrial fibrillation ([facility] [address] )  Assessment & Plan  · Continue metoprolol  INR supratherapeutic on admission requiring vitamin K and Kcentra  · Restart anticoagulation when cleared by GI    Results from last 7 days   Lab Units 08/08/21  0447 08/07/21  0547 08/06/21  0559 08/05/21  0948 08/05/21  0407   INR  1 94* 1 82* 1 29* 1 67* 5 49*       SIRS (systemic inflammatory response syndrome) (HCC)  Assessment & Plan  · Secondary to GI bleeding and hypotension  No infection    Lactic acidosis-resolved as of 8/6/2021  Assessment & Plan  · Secondary to GI bleeding/hypotension  Resolved    Results from last 7 days   Lab Units 08/05/21  0641 08/05/21  0407   LACTIC ACID mmol/L 1 5 4 1*       VTE Pharmacologic Prophylaxis: VTE Score: 7 High Risk (Score >/= 5) - Pharmacological DVT Prophylaxis Contraindicated  Sequential Compression Devices Ordered  Patient Centered Rounds: I have performed bedside rounds with nursing staff today    Discussions with Specialists or Other Care Team Provider:  Left message with GI    Education and Discussions with Family / Patient:  Sister bedside    Time Spent for Care: 25 mins  More than 50% of total time spent on counseling and coordination of care as described above  Current Length of Stay: 3 day(s)  Current Patient Status: Inpatient   Certification Statement: The patient will continue to require additional inpatient hospital stay due to anemia  Discharge Plan / Estimated Discharge Date: 24-48 hours    Code Status: Level 1 - Full Code      Subjective:   Patient seen and examined  No new complaints  Last bowel movement last night dark brown/black  No BM today  Objective:   Vitals: Blood pressure 124/64, pulse 101, temperature 97 7 °F (36 5 °C), temperature source Oral, resp  rate 18, height 5' 4" (1 626 m), weight 93 7 kg (206 lb 9 1 oz), SpO2 96 %  Physical Exam  Vitals reviewed  Constitutional:       General: She is not in acute distress  Appearance: Normal appearance  Eyes:      General: No scleral icterus  Cardiovascular:      Rate and Rhythm: Rhythm irregular  Heart sounds: Normal heart sounds  Pulmonary:      Breath sounds: Decreased breath sounds present  No wheezing  Abdominal:      General: Bowel sounds are normal       Palpations: Abdomen is soft  Tenderness: There is no guarding or rebound  Musculoskeletal:         General: No swelling  Skin:     General: Skin is warm  Neurological:      General: No focal deficit present  Mental Status: She is alert and oriented to person, place, and time         Additional Data:   Labs:  Results from last 7 days   Lab Units 08/08/21  0447 08/07/21  0547 08/06/21  1954 08/06/21  1422 08/06/21  0559 08/05/21  1758 08/05/21  0948 08/05/21  0407   WBC Thousand/uL 8 48 10 40*  --   --  10 98*  --  17 18* 18 27*   HEMOGLOBIN g/dL 7 6* 7 7* 8 0* 8 8* 8 0* 8 4* 7 8* 7 5*   HEMATOCRIT % 24 8* 24 1*  --   --  25 8* 26 9* 25 4* 24 8*   MCV fL 92 92  --   --  92  --  91 93   PLATELETS Thousands/uL 187 203  --   --  224  --  275 345   INR  1 94* 1 82*  --   --  1 29*  --  1 67* 5 49* Results from last 7 days   Lab Units 08/07/21  0547 08/06/21  1422 08/06/21  0559 08/05/21  0407   SODIUM mmol/L 141 138 141 141   POTASSIUM mmol/L 4 0 4 3 4 4 4 7   CHLORIDE mmol/L 107 106 109* 105   CO2 mmol/L 27 22 25 21   ANION GAP mmol/L 7 10 7 15*   BUN mg/dL 15 21 23 34*   CREATININE mg/dL 0 85 1 01 0 83 1 16   CALCIUM mg/dL 7 4* 7 7* 7 4* 6 9*   ALBUMIN g/dL  --   --   --  2 1*   TOTAL BILIRUBIN mg/dL  --   --   --  1 08*   ALK PHOS U/L  --   --   --  77   ALT U/L  --   --   --  33   AST U/L  --   --   --  37   EGFR ml/min/1 73sq m 64 52 65 44   GLUCOSE RANDOM mg/dL 93 158* 92 165*     Results from last 7 days   Lab Units 08/07/21  0547 08/06/21  0559   MAGNESIUM mg/dL 1 7 1 9   PHOSPHORUS mg/dL 3 2 3 3     Results from last 7 days   Lab Units 08/05/21  0407   TROPONIN I ng/mL <0 02          Results from last 7 days   Lab Units 08/05/21  0641 08/05/21  0407   LACTIC ACID mmol/L 1 5 4 1*                 * I Have Reviewed All Lab Data Listed Above  Cultures:   Results from last 7 days   Lab Units 08/05/21  0440   BLOOD CULTURE  No Growth at 72 hrs  No Growth at 72 hrs  Lines/Drains:  Invasive Devices     Peripheral Intravenous Line            Peripheral IV 08/05/21 Left Hand 3 days    Peripheral IV 08/05/21 Right Antecubital 3 days          Drain            External Urinary Catheter 2 days              Telemetry:      Imaging:  Imaging Reports Reviewed Today Include:     XR chest 1 view portable    Result Date: 8/5/2021  Impression: No acute cardiopulmonary disease   Workstation performed: [id number]     Scheduled Meds:  Current Facility-Administered Medications   Medication Dose Route Frequency Provider Last Rate    acetaminophen  650 mg Oral Q6H PRN Lula Subramanian PA-C      metoprolol succinate  50 mg Oral Daily Lula Subramanian PA-C      ondansetron  4 mg Intravenous Q4H PRN Margareth Lopez DO      pantoprazole  40 mg Oral BID TAYA Clifford PA-C      pravastatin  20 mg Oral Daily With SHASHI Townsend DO [address] Internal Medicine  Hospitalist    ** Please Note: This note has been constructed using a voice recognition system   **

## 2021-08-08 NOTE — ASSESSMENT & PLAN NOTE
· Melanotic stool on admission with acute blood loss anemia requiring 2 units PRBC  · Status post EGD with nonbleeding duodenal ulcers    · Currently hemoglobins stable without evidence of further bleeding  · GI to decide on colonoscopy vs restarting anticoagulation    Results from last 7 days   Lab Units 08/09/21  0604 08/08/21  0447 08/07/21  0547 08/06/21  1954 08/06/21  1422 08/06/21  0559 08/05/21  1758 08/05/21  0948 08/05/21  0407   HEMOGLOBIN g/dL 7 8* 7 6* 7 7* 8 0* 8 8* 8 0* 8 4* 7 8* 7 5*

## 2021-08-08 NOTE — ASSESSMENT & PLAN NOTE
· Continue metoprolol    INR supratherapeutic on admission requiring vitamin K and Kcentra  · Restart anticoagulation when cleared by GI    Results from last 7 days   Lab Units 08/09/21  0604 08/08/21  0447 08/07/21  0547 08/06/21  0559 08/05/21  0948 08/05/21  0407   INR  1 76* 1 94* 1 82* 1 29* 1 67* 5 49*

## 2021-08-09 PROBLEM — I95.9 HYPOTENSION (ARTERIAL): Status: ACTIVE | Noted: 2021-08-09

## 2021-08-09 LAB
ALBUMIN SERPL BCP-MCNC: 2.3 G/DL (ref 3.5–5)
ALP SERPL-CCNC: 77 U/L (ref 46–116)
ALT SERPL W P-5'-P-CCNC: 17 U/L (ref 12–78)
ANION GAP SERPL CALCULATED.3IONS-SCNC: 8 MMOL/L (ref 4–13)
AST SERPL W P-5'-P-CCNC: 25 U/L (ref 5–45)
BILIRUB SERPL-MCNC: 1.16 MG/DL (ref 0.2–1)
BUN SERPL-MCNC: 8 MG/DL (ref 5–25)
CALCIUM ALBUM COR SERPL-MCNC: 9.1 MG/DL (ref 8.3–10.1)
CALCIUM SERPL-MCNC: 7.7 MG/DL (ref 8.3–10.1)
CHLORIDE SERPL-SCNC: 104 MMOL/L (ref 100–108)
CO2 SERPL-SCNC: 29 MMOL/L (ref 21–32)
CREAT SERPL-MCNC: 0.86 MG/DL (ref 0.6–1.3)
ERYTHROCYTE [DISTWIDTH] IN BLOOD BY AUTOMATED COUNT: 17.5 % (ref 11.6–15.1)
GFR SERPL CREATININE-BSD FRML MDRD: 63 ML/MIN/1.73SQ M
GLUCOSE SERPL-MCNC: 96 MG/DL (ref 65–140)
HCT VFR BLD AUTO: 25.8 % (ref 34.8–46.1)
HGB BLD-MCNC: 7.8 G/DL (ref 11.5–15.4)
INR PPP: 1.76 (ref 0.84–1.19)
MCH RBC QN AUTO: 28 PG (ref 26.8–34.3)
MCHC RBC AUTO-ENTMCNC: 30.2 G/DL (ref 31.4–37.4)
MCV RBC AUTO: 93 FL (ref 82–98)
PLATELET # BLD AUTO: 194 THOUSANDS/UL (ref 149–390)
PMV BLD AUTO: 10 FL (ref 8.9–12.7)
POTASSIUM SERPL-SCNC: 4.2 MMOL/L (ref 3.5–5.3)
PROT SERPL-MCNC: 5.7 G/DL (ref 6.4–8.2)
PROTHROMBIN TIME: 20.2 SECONDS (ref 11.6–14.5)
RBC # BLD AUTO: 2.79 MILLION/UL (ref 3.81–5.12)
SODIUM SERPL-SCNC: 141 MMOL/L (ref 136–145)
WBC # BLD AUTO: 6.91 THOUSAND/UL (ref 4.31–10.16)

## 2021-08-09 PROCEDURE — 97163 PT EVAL HIGH COMPLEX 45 MIN: CPT

## 2021-08-09 PROCEDURE — 97535 SELF CARE MNGMENT TRAINING: CPT

## 2021-08-09 PROCEDURE — 85610 PROTHROMBIN TIME: CPT | Performed by: INTERNAL MEDICINE

## 2021-08-09 PROCEDURE — 80053 COMPREHEN METABOLIC PANEL: CPT | Performed by: INTERNAL MEDICINE

## 2021-08-09 PROCEDURE — 97167 OT EVAL HIGH COMPLEX 60 MIN: CPT

## 2021-08-09 PROCEDURE — 99232 SBSQ HOSP IP/OBS MODERATE 35: CPT | Performed by: INTERNAL MEDICINE

## 2021-08-09 PROCEDURE — 97530 THERAPEUTIC ACTIVITIES: CPT

## 2021-08-09 PROCEDURE — 85027 COMPLETE CBC AUTOMATED: CPT | Performed by: INTERNAL MEDICINE

## 2021-08-09 RX ORDER — POLYETHYLENE GLYCOL 3350 17 G/17G
238 POWDER, FOR SOLUTION ORAL ONCE
Status: COMPLETED | OUTPATIENT
Start: 2021-08-09 | End: 2021-08-09

## 2021-08-09 RX ADMIN — PANTOPRAZOLE SODIUM 40 MG: 40 TABLET, DELAYED RELEASE ORAL at 17:01

## 2021-08-09 RX ADMIN — BISACODYL 20 MG: 5 TABLET, COATED ORAL at 17:02

## 2021-08-09 RX ADMIN — ACETAMINOPHEN 650 MG: 325 TABLET ORAL at 05:49

## 2021-08-09 RX ADMIN — METOPROLOL SUCCINATE 50 MG: 50 TABLET, EXTENDED RELEASE ORAL at 08:28

## 2021-08-09 RX ADMIN — PANTOPRAZOLE SODIUM 40 MG: 40 TABLET, DELAYED RELEASE ORAL at 05:50

## 2021-08-09 RX ADMIN — POLYETHYLENE GLYCOL 3350 238 G: 17 POWDER, FOR SOLUTION ORAL at 17:53

## 2021-08-09 RX ADMIN — PRAVASTATIN SODIUM 20 MG: 20 TABLET ORAL at 17:01

## 2021-08-09 NOTE — PROGRESS NOTES
[address]  Progress Note Poppy Rodriguez 1940, 78 y o  female MRN: [id number]  Unit/Bed#: -01 Encounter: [id number]  Primary Care Provider: Anthony Sun DO   Date and time admitted to hospital: 8/5/2021  4:02 AM    * Hemorrhagic shock due to gastrointestinal bleeding  Assessment & Plan  · Melanotic stool on admission with acute blood loss anemia requiring 2 units PRBC  · Status post EGD with nonbleeding duodenal ulcers  · Currently hemoglobins stable without evidence of further bleeding  · GI to decide on colonoscopy vs restarting anticoagulation    Results from last 7 days   Lab Units 08/09/21  0604 08/08/21  0447 08/07/21  0547 08/06/21  1954 08/06/21  1422 08/06/21  0559 08/05/21  1758 08/05/21  0948 08/05/21  0407   HEMOGLOBIN g/dL 7 8* 7 6* 7 7* 8 0* 8 8* 8 0* 8 4* 7 8* 7 5*       Hypotension (arterial)  Assessment & Plan  · Secondary to blood loss anemia  Was dizzy this morning  · If persistent symptoms will transfuse again  Monitor with metoprolol use for rate control of atrial fibrillation  Hyperlipidemia  Assessment & Plan  · Simvastatin substituted with pravastatin based on hospital formulary    Atrial fibrillation [facility])  Assessment & Plan  · Continue metoprolol  INR supratherapeutic on admission requiring vitamin K and Kcentra  · Restart anticoagulation when cleared by GI    Results from last 7 days   Lab Units 08/09/21  0604 08/08/21  0447 08/07/21  0547 08/06/21  0559 08/05/21  0948 08/05/21  0407   INR  1 76* 1 94* 1 82* 1 29* 1 67* 5 49*       SIRS (systemic inflammatory response syndrome) (HCC)  Assessment & Plan  · Secondary to GI bleeding and hypotension  No infection    Lactic acidosis-resolved as of 8/6/2021  Assessment & Plan  · Secondary to GI bleeding/hypotension    Resolved    Results from last 7 days   Lab Units 08/05/21  0641 08/05/21  0407   LACTIC ACID mmol/L 1 5 4 1*       VTE Pharmacologic Prophylaxis: VTE Score: 7 High Risk (Score >/= 5) - Pharmacological DVT Prophylaxis Contraindicated  Sequential Compression Devices Ordered  Patient Centered Rounds: I have performed bedside rounds with nursing staff today  Discussions with Specialists or Other Care Team Provider:  Case management    Education and Discussions with Family / Patient:  Son at bedside    Time Spent for Care: 25 mins  More than 50% of total time spent on counseling and coordination of care as described above  Current Length of Stay: 4 day(s)  Current Patient Status: Inpatient   Certification Statement: The patient will continue to require additional inpatient hospital stay due to GI bleeding  Discharge Plan / Estimated Discharge Date: 24-48 hours when cleared by GI    Code Status: Level 1 - Full Code      Subjective:   Patient seen and examined  Very dizzy this morning when working with physical therapy  Hypotensive    Objective:   Vitals: Blood pressure 102/67, pulse 70, temperature 98 6 °F (37 °C), temperature source Oral, resp  rate 18, height 5' 4" (1 626 m), weight 93 7 kg (206 lb 9 1 oz), SpO2 96 %  Physical Exam  Vitals reviewed  Constitutional:       General: She is not in acute distress  Appearance: Normal appearance  Eyes:      General: No scleral icterus  Cardiovascular:      Rate and Rhythm: Rhythm irregular  Heart sounds: Normal heart sounds  Pulmonary:      Breath sounds: Normal breath sounds  No wheezing  Abdominal:      General: Bowel sounds are normal       Palpations: Abdomen is soft  Tenderness: There is no guarding or rebound  Musculoskeletal:         General: No deformity  Skin:     General: Skin is warm  Neurological:      General: No focal deficit present  Mental Status: She is alert and oriented to person, place, and time     Psychiatric:         Mood and Affect: Mood normal        Additional Data:   Labs:  Results from last 7 days   Lab Units 08/09/21  0604 08/08/21  0447 08/07/21  0547 08/06/21  1954 08/06/21  1422 08/06/21  0559 08/05/21  1758 08/05/21  0948 08/05/21  0407   WBC Thousand/uL 6 91 8 48 10 40*  --   --  10 98*  --  17 18* 18 27*   HEMOGLOBIN g/dL 7 8* 7 6* 7 7* 8 0* 8 8* 8 0* 8 4* 7 8* 7 5*   HEMATOCRIT % 25 8* 24 8* 24 1*  --   --  25 8* 26 9* 25 4* 24 8*   MCV fL 93 92 92  --   --  92  --  91 93   PLATELETS Thousands/uL 194 187 203  --   --  224  --  275 345   INR  1 76* 1 94* 1 82*  --   --  1 29*  --  1 67* 5 49*     Results from last 7 days   Lab Units 08/09/21  0604 08/07/21  0547 08/06/21  1422 08/06/21  0559 08/05/21  0407   SODIUM mmol/L 141 141 138 141 141   POTASSIUM mmol/L 4 2 4 0 4 3 4 4 4 7   CHLORIDE mmol/L 104 107 106 109* 105   CO2 mmol/L 29 27 22 25 21   ANION GAP mmol/L 8 7 10 7 15*   BUN mg/dL 8 15 21 23 34*   CREATININE mg/dL 0 86 0 85 1 01 0 83 1 16   CALCIUM mg/dL 7 7* 7 4* 7 7* 7 4* 6 9*   ALBUMIN g/dL 2 3*  --   --   --  2 1*   TOTAL BILIRUBIN mg/dL 1 16*  --   --   --  1 08*   ALK PHOS U/L 77  --   --   --  77   ALT U/L 17  --   --   --  33   AST U/L 25  --   --   --  37   EGFR ml/min/1 73sq m 63 64 52 65 44   GLUCOSE RANDOM mg/dL 96 93 158* 92 165*     Results from last 7 days   Lab Units 08/07/21  0547 08/06/21  0559   MAGNESIUM mg/dL 1 7 1 9   PHOSPHORUS mg/dL 3 2 3 3     Results from last 7 days   Lab Units 08/05/21  0407   TROPONIN I ng/mL <0 02          Results from last 7 days   Lab Units 08/05/21  0641 08/05/21  0407   LACTIC ACID mmol/L 1 5 4 1*                 * I Have Reviewed All Lab Data Listed Above  Cultures:   Results from last 7 days   Lab Units 08/05/21  0440   BLOOD CULTURE  No Growth After 4 Days  No Growth After 4 Days                   Lines/Drains:  Invasive Devices     Peripheral Intravenous Line            Peripheral IV 08/05/21 Left;Dorsal (posterior) Hand 4 days    Peripheral IV 08/09/21 Right;Lateral Wrist <1 day          Drain            External Urinary Catheter 3 days              Telemetry:      Imaging:  Imaging Reports Reviewed Today Include:     XR chest 1 view portable    Result Date: 8/5/2021  Impression: No acute cardiopulmonary disease  Workstation performed: [id number]     Scheduled Meds:  Current Facility-Administered Medications   Medication Dose Route Frequency Provider Last Rate    acetaminophen  650 mg Oral Q6H PRN Esthela Nur PA-C      metoprolol succinate  50 mg Oral Daily Esthela Nur PA-C      ondansetron  4 mg Intravenous Q4H PRN Nicolasa Osborn DO      pantoprazole  40 mg Oral BID AC Esthela Nur PA-C      pravastatin  20 mg Oral Daily With SHASHI Klein DO [address] Internal Medicine  Hospitalist    ** Please Note: This note has been constructed using a voice recognition system   **

## 2021-08-09 NOTE — PLAN OF CARE
Problem: PHYSICAL THERAPY ADULT  Goal: Performs mobility at highest level of function for planned discharge setting  See evaluation for individualized goals  Description: Treatment/Interventions: Functional transfer training, LE strengthening/ROM, Elevations, Therapeutic exercise, Endurance training, Patient/family training, Bed mobility, Equipment eval/education, Gait training, Compensatory technique education, Spoke to nursing, Spoke to case management, OT  Equipment Recommended:  (walker-pt reports having)       See flowsheet documentation for full assessment, interventions and recommendations  Note: Prognosis: Good  Problem List: Decreased strength, Decreased endurance, Impaired balance, Decreased mobility, Decreased safety awareness, Obesity, Pain  Assessment: Pt is a 78 y o  female seen for PT evaluation s/p admission to [address] [place] [address] on 8/5/2021 with Acute blood loss anemia  Order placed for PT services  Upon evaluation: Pt is presenting with impaired functional mobility due to pain, decreased strength, decreased endurance, impaired balance, gait deviations, decreased safety awareness, fall risk and LE edema requiring supervision assistance for transfers and stand by assistance for ambulation with RW  Pt's clinical presentation is currently unpredictable given the functional mobility deficits above, especially weakness, edema of extremities, decreased endurance, gait deviations, pain, decreased activity tolerance, decreased functional mobility tolerance and decreased safety awareness, coupled with fall risks as indicated by AM-PAC 6-Clicks: 99/01 as well as impaired balance, polypharmacy and decreased safety awareness and combined with medical complications of hypotension, tachycardia, pain impacting overall mobility status, abnormal H&H, abnormal blood sugars and lactic acidosis, elevated INR on admission, L LE intramuscular hematoma    Pt's PMHx and comorbidities that may affect physical performance and progress include: A fib, HTN, h/o PE and bradycardia, OA, radiculopathy, Vitamin D deficiency  Personal factors affecting pt at time of IE include: inaccessible home environment, step(s) to enter environment, multi-level environment, inability to perform IADLs, inability to perform ADLs, inability to navigate level surfaces without external assistance and inability to ambulate household distances  Pt will benefit from continued skilled PT services to address deficits as defined above and to maximize level of functional mobility to facilitate return toward PLOF and improved QOL  From PT/mobility standpoint, recommendation at time of d/c would be Home PT, home with family support and with walker pending progress in order to reduce fall risk and maximize pt's functional independence and consistency with mobility in order to facilitate return to PLOF  If patient with limited progress or unavailable assistance from son, may need to consider post acute rehab  Recommend ther ex next 1-2 sessions  Barriers to Discharge: Decreased caregiver support, Inaccessible home environment  Barriers to Discharge Comments: pt has 13 steps to main level  Per son pt can go in back way if needed, but has FF to 2nd floor  Pt reports she can stay on 1st floor if needed  decreased activity tolerance  PT Discharge Recommendation: Home with home health rehabilitation (pending progress and availability from son to assist  )     PT - OK to Discharge:  (need to trial stairs and increase mobility)    See flowsheet documentation for full assessment

## 2021-08-09 NOTE — OCCUPATIONAL THERAPY NOTE
Occupational Therapy Evaluation     Patient Name: Moise Cueto  OMEDX'D Date: 8/9/2021  Problem List  Principal Problem:    Hemorrhagic shock due to gastrointestinal bleeding  Active Problems:    SIRS (systemic inflammatory response syndrome) (HCC)    Atrial fibrillation (HCC)    Hyperlipidemia    Hypotension (arterial)    Past Medical History  Past Medical History:   Diagnosis Date    Anemia     Atrial fibrillation (HCC)     Bradycardia     GERD (gastroesophageal reflux disease)     History of pulmonary embolus (PE)     History of transfusion     Hypertension     Osteoarthritis     Radiculopathy     Vitamin D deficiency      Past Surgical History  Past Surgical History:   Procedure Laterality Date    APPENDECTOMY      BACK SURGERY      EYE SURGERY      JOINT REPLACEMENT             08/09/21 0905   Note Type   Note type Evaluation   Restrictions/Precautions   Other Precautions Chair Alarm; Bed Alarm;Pain; Fall Risk;Multiple lines;O2   Pain Assessment   Pain Assessment Tool 0-10   Pain Score No Pain  (at rest  neck pain towards end of session)   Home Living   Type of Home House  ([address])   Home Layout Two level;Bed/bath upstairs;1/2 bath on main level  (FF R HR)   Bathroom Shower/Tub Tub/shower unit   [employer] Raised   Bathroom Equipment Grab bars in shower; Shower chair;Grab bars around toilet   Home Equipment Walker;Cane   Additional Comments Pt reports living in a 2 story home with [address]  bed/bath on 2nd floor   Pt ambulates with SPC at baseline   Prior Function   Level of [place] Independent with ADLs and functional mobility   Lives With Son   Receives Help From Family   ADL Assistance Independent   IADLs Independent  (+ driving)   Falls in the last 6 months 0   Comments Pt reports completing ADLs, IADLs, functional ambulation and community mobility + drivign @ I   ADL   Where Assessed Edge of bed   Eating Assistance 6  Modified independent   Eating Deficit Setup   Grooming Assistance 5  Supervision/Setup   Grooming Deficit Setup   UB Bathing Assistance 5  Supervision/Setup   UB Bathing Deficit Setup   LB Bathing Assistance 4  Minimal Assistance   LB Bathing Deficit Verbal cueing;Supervision/safety; Increased time to complete;Perineal area; Buttocks;Right lower leg including foot; Left lower leg including foot   UB Dressing Assistance 5  Supervision/Setup   UB Dressing Deficit Setup   LB Dressing Assistance 4  Minimal Assistance   LB Dressing Deficit Don/doff R sock; Don/doff L sock;Pull up over hips; Thread RLE into pants; Thread LLE into pants   Toileting Assistance  5  Supervision/Setup   Toileting Deficit Verbal cueing;Supervison/safety; Increased time to complete;Grab bar use;Clothing management up;Clothing management down;Perineal hygiene   Additional Comments Pt completing ADL tasks while seated at EOB  UB Dressing and grooming tasks @ S after set-up  LB Dressing @ Min A for donning pants around feet  Pt reports "I don't wear socks at home, I have slippers"  Pt completing CM around waist @ S with increased time  Please refer to treatment note for performance during bathing and toileting tasks      Bed Mobility   Supine to Sit 6  Modified independent   Additional items Increased time required   Additional Comments HOB flat, no bedrails   Transfers   Sit to Stand 5  Supervision   Additional items Increased time required;Verbal cues   Stand to Sit 5  Supervision   Additional items Increased time required;Verbal cues   Stand pivot 5  Supervision   Additional items Increased time required;Verbal cues   Additional Comments Pt completing functional transfers with use of RW for UB support, increased time and vc'ing for asfety/hand placement   Balance   Static Sitting Good   Dynamic Sitting Fair +   Static Standing Fair   Dynamic Standing Fair -   Activity Tolerance   Activity Tolerance Patient limited by fatigue   Medical Staff Made Aware Katharine with PTRaman with RN, Marta Marina   RUE Assessment   RUE Assessment WFL   LUE Assessment   LUE Assessment WFL   Hand Function   Gross Motor Coordination Functional   Fine Motor Coordination Functional   Sensation   Light Touch No apparent deficits   Cognition   Overall Cognitive Status WFL   Arousal/Participation Alert; Responsive; Cooperative   Attention Attends with cues to redirect   Orientation Level Oriented X4   Memory Within functional limits   Following Commands Follows one step commands without difficulty   Assessment   Limitation Decreased ADL status; Decreased UE strength;Decreased Safe judgement during ADL;Decreased endurance;Decreased self-care trans;Decreased high-level ADLs   Prognosis Good   Assessment Pt is an 78 /yo F admitted to [address] [place] [address] 8/5/2021 d/t experiencing symptomatic anemia d/t GI bleed  Dx: hemorrhagic shock d/t GI bleed  Pt with PMHx impacting performance during functional tasks including: anemia, a-fib, bradycardic, GERD, hx transfusion, osteoarthritis, radiculopathy, PE  Pt reports living at home with her son in a 2 story home with [address]  Pt ambulates with SPC at baseline and reports completing ADLs, light IADLs and community mobility + driving @ Mod I  On evaluation, Pt A&Ox4  Pt completing supine>sit @ Mod I  UB dressing and grooming tasks @ S  LB dressing @ Min A  Pt completing STS and SPT @ S with use of RW for UB support  Pt's BUE ROM and MS WFL  Pt with c/o lightheadedness during session, please refer to chart below  Pt starting on 2L of O2 at start of session, satting at 99%  Pt decreased to RA during session, satting >92%  RN aware  Pt's barriers to d/c include: decrease activity tolerance, decrease standing balance, decrease performance during ADL tasks, decrease safety awareness , decrease UB MS, increased pain, decrease generalized strength, decrease activity engagement, decrease performance during functional transfers and steps to enter home   Pt would benefit from continued acute OT services to address deficits as well as HHOT upon d/c from [address] [place] [address]  Plan   Treatment Interventions ADL retraining;Functional transfer training;UE strengthening/ROM; Endurance training;Equipment evaluation/education;Patient/family training;Neuromuscular reeducation; Compensatory technique education;Continued evaluation; Energy conservation; Activityengagement   Goal Expiration Date 08/19/21   OT Treatment Day 1   OT Frequency 3-5x/wk   Additional Treatment Session   Start Time 0935   End Time 7659   Treatment Assessment Pt seen for treatment session #1 this date  Pt alert and agreeable to participate at this time  Pt completing UB washing @ S after set-up (thoroughness for back)  LB washing @ MIn A for buttocks and pericare thoroughness  Pt completing toileting tasks @ S including STS from toielt, CM around waist while standing with UB support and pericare hygiene  Pt requirign increased time and rest breaks  Pt c/o of lightheadedness while seated on toilet  please refer to chart below for vitals  Pt then completing short distance ambulation back to recliner chair @ S with increased time   Pt seated OOB in recliner chair with call bell in reach, chair alarm intact and all needs meta t this time    Additional Treatment Day 1   Recommendation   OT Discharge Recommendation Home with home health rehabilitation   AM-PAC Daily Activity Inpatient   Lower Body Dressing 3   Bathing 3   Toileting 3   Upper Body Dressing 4   Grooming 4   Eating 4   Daily Activity Raw Score 21   Daily Activity Standardized Score (Calc for Raw Score >=11) 44 27   AM-PAC Applied Cognition Inpatient   Following a Speech/Presentation 4   Understanding Ordinary Conversation 4   Taking Medications 3   Remembering Where Things Are Placed or Put Away 3   Remembering List of 4-5 Errands 3   Taking Care of Complicated Tasks 3   Applied Cognition Raw Score 20   Applied Cognition Standardized Score 41 76        08/09/21 0932 08/09/21 0936 08/09/21 0938   Vitals   Pulse 85 74 75 Blood Pressure (!) 89/58  (seated on toilet - "lightheaded") 90/52  (Seated on toilet - "getting better") 91/64  (Seated on toielt - "better_)   MAP (mmHg) 68 64 71   BP Location   (right forearm)   (right forearm)   (left forearm)   Oxygen Therapy   SpO2 95 % 97 % 96 %   SpO2 Activity At Rest At Rest At Rest   O2 Device None (Room air) None (Room air) None (Room air)      08/09/21 0945   Vitals   Pulse 70   Blood Pressure 102/67  (Seated in recliner chair - "pain in my neck")   MAP (mmHg) 80   BP Location   (Left forearm)   Oxygen Therapy   SpO2 96 %   SpO2 Activity At Rest   O2 Device None (Room air)       The patient's raw score on the AM-PAC Daily Activity inpatient short form is 21, standardized score is 44 27, greater than 39 4  Patients at this level are likely to benefit from DC to home  Please refer to the recommendation of the Occupational Therapist for safe DC planning  Pt goals to be met by 8/19/2021    1  Pt will demonstrate ability to complete LB dressing @ Mod I in order to increase safety and independence during meaningful tasks  2    3  Pt will demonstrate ability to complete toileting tasks including CM and pericare @ Mod I in order to increase safety and independence during meaningful tasks  4  Pt will demonstrate ability to complete EOB, chair, toilet/commode transfers @ Mod I in order to increase performance and participation during functional tasks  5  Pt will demonstrate ability to stand for 7-8 minutes while maintaining G balance with use of RW for UB support PRN  6  Pt will demonstrate ability to tolerate 30-35 minute OT session with no vc'ing for deep breathing or use of energy conservation techniques in order to increase activity tolerance during functional tasks  7  Pt will demonstrate Good carryover of use of energy conservation/compensatory strategies during ADLs and functional tasks in order to increase safety and reduce risk for falls     8  Pt will demonstrate Good attention and participation in continued evaluation of functional ambulation house hold distances in order to assist with safe d/c planning  9  Pt will attend to continued cognitive assessments 100% of the time in order to provide most appropriate d/c recommendations  10  Pt will follow 100% simple 2-step commands and be A&O x4 consistently with environmental cues to increase participation in functional activities  11  Pt will identify 3 areas of interest/hobbies and 1 intervention on how to incorporate into daily life in order to increase interaction with environment and peers as well as increase participation in meaningful tasks  12  Pt will demonstrate 100% carryover of BUE HEP in order to increase BUE MS and increase performance during functional tasks upon d/c home      Jg Beckett OTR/L

## 2021-08-09 NOTE — PHYSICAL THERAPY NOTE
PHYSICAL THERAPY EVALUATION  NAME:  Dwight Andrews  DATE: 08/09/21    AGE:   78 y o  Mrn:   [id number]  ADMIT DX:  Lactic acidosis [E87 2]  Vomiting blood [K92 0]  Atrial fibrillation with RVR (HCC) [I48 91]  Gastrointestinal hemorrhage, unspecified gastrointestinal hemorrhage type [K92 2]    Past Medical History:   Diagnosis Date    Anemia     Atrial fibrillation (HCC)     Bradycardia     GERD (gastroesophageal reflux disease)     History of pulmonary embolus (PE)     History of transfusion     Hypertension     Osteoarthritis     Radiculopathy     Vitamin D deficiency      Length Of Stay: 4  Performed at least 2 patient identifiers during session: Name and Birthday  PHYSICAL THERAPY EVALUATION :        08/09/21 0904   PT Last Visit   PT Visit Date 08/09/21   Note Type   Note type Evaluation   Pain Assessment   Pain Assessment Tool 0-10   Pain Score No Pain   Pain Location/Orientation Orientation: Left; Location: Leg   Pain Radiating Towards increased pain left leg over eccymotic area   Home Living   Type of Home House  (13 [address] B HRs)   Home Layout Two level;Bed/bath upstairs;1/2 bath on main level  (FF with R HR)   Bathroom Shower/Tub Tub/shower unit   [employer] Raised   Bathroom Equipment Grab bars in shower; Shower chair;Grab bars around toilet   Home Equipment Cane;Walker   Additional Comments Reports living in a [facility] with 13 [address] with B HRs and FF R HR to 2nd floor bedroom and bathroom  Reports using spc at baseline  Prior Function   Level of [place] Independent with ADLs and functional mobility   Lives With Son   Receives Help From Family   ADL Assistance Independent   IADLs Independent  (+ driving)   Falls in the last 6 months 0   Comments Reports being independent for mobility with spc and independent with ADLs and IADLs  Pt was at a local SNF for post acute rehab where she was ambulating with RW there  Plans to return home when able      Restrictions/Precautions   Other Precautions Chair Alarm; Bed Alarm; Fall Risk;O2  (2 lpm start of session then room air)   General   Family/Caregiver Present Yes  (son and dtr in law)   Cognition   Orientation Level Oriented X4   Following Commands Follows one step commands without difficulty   RLE Assessment   RLE Assessment WFL  (3+/5)   LLE Assessment   LLE Assessment WFL  (3+/5 except hip flexion and knee flexion 3-/5)   Coordination   Movements are Fluid and Coordinated 1  (alt toe tapping)   Light Touch   RLE Light Touch Grossly intact   LLE Light Touch Grossly intact   Bed Mobility   Supine to Sit 6  Modified independent   Additional items Increased time required   Additional Comments HOB flat without bedrail  completed bed mobility with increased time  Transfers   Sit to Stand 5  Supervision   Additional items Increased time required;Verbal cues   Stand to Sit 5  Supervision   Additional items Increased time required;Verbal cues   Stand pivot 5  Supervision   Additional items Increased time required;Verbal cues   Ambulation/Elevation   Gait pattern Antalgic; Wide VERNELL; Decreased foot clearance;Decreased L stance; Excessively slow; Short stride   Gait Assistance   (stand by assistance)   Additional items Assist x 1;Verbal cues   Assistive Device Rolling walker   Distance 28'x1 with RW with stand by assistance with slow juliet, dec R foot clearance and decreased stance L LE   Balance   Static Sitting Good   Dynamic Sitting Fair +   Static Standing Fair   Dynamic Standing Fair -   Ambulatory Fair -   Endurance Deficit   Endurance Deficit Yes   Endurance Deficit Description SpO2 on 2 lpm 97%  trialed on room air  SpO2 94-96%   Activity Tolerance   Activity Tolerance Patient limited by fatigue   Medical Staff Made Aware [facility]   Nurse Made Aware Katerina MORE   Assessment   Prognosis Good   Problem List Decreased strength;Decreased endurance; Impaired balance;Decreased mobility; Decreased safety awareness; Obesity;Pain   Barriers to Discharge Decreased caregiver support; Inaccessible home environment   Barriers to Discharge Comments pt has 13 steps to main level  Per son pt can go in back way if needed, but has FF to 2nd floor  Pt reports she can stay on 1st floor if needed  decreased activity tolerance  Goals   Patient Goals "Go home"   [facility] Expiration Date 08/19/21   PT Treatment Day 1   Plan   Treatment/Interventions Functional transfer training;LE strengthening/ROM; Elevations; Therapeutic exercise; Endurance training;Patient/family training;Bed mobility; Equipment eval/education;Gait training; Compensatory technique education;Spoke to nursing;Spoke to case management;OT   PT Frequency Other (Comment)  (3-5x/week)   Recommendation   PT Discharge Recommendation Home with home health rehabilitation  (pending progress and availability from son to assist  )   Equipment Recommended   (walker-pt reports having)   PT - OK to Discharge   (need to trial stairs and increase mobility)   Additional Comments pt sitting in recliner chair at end of session with all needs within reach  Additional Comments 2 patient may be able to return to home pending medical status and availability of son to assist pt with steps  Should patient not have assistance or decline in funcitonal status, will need to consider post acute rehab  AM-PAC Basic Mobility Inpatient   Turning in Bed Without Bedrails 4   Lying on Back to Sitting on Edge of Flat Bed 4   Moving Bed to Chair 3   Standing Up From Chair 3   Walk in Room 3   Climb 3-5 Stairs 3   Basic Mobility Inpatient Raw Score 20   Basic Mobility Standardized Score 43 99      (Please find full objective findings from PT assessment regarding body systems outlined above)  Assessment: Pt is a 78 y o  female seen for PT evaluation s/p admission to [address] [place] [address] on 8/5/2021 with Acute blood loss anemia  Order placed for PT services    Upon evaluation: Pt is presenting with impaired functional mobility due to pain, decreased strength, decreased endurance, impaired balance, gait deviations, decreased safety awareness, fall risk and LE edema requiring supervision assistance for transfers and stand by assistance for ambulation with RW  Pt's clinical presentation is currently unpredictable given the functional mobility deficits above, especially weakness, edema of extremities, decreased endurance, gait deviations, pain, decreased activity tolerance, decreased functional mobility tolerance and decreased safety awareness, coupled with fall risks as indicated by AM-PAC 6-Clicks: 79/18 as well as impaired balance, polypharmacy and decreased safety awareness and combined with medical complications of hypotension, tachycardia, pain impacting overall mobility status, abnormal H&H, abnormal blood sugars and lactic acidosis, elevated INR on admission, L LE intramuscular hematoma  Pt's PMHx and comorbidities that may affect physical performance and progress include: A fib, HTN, h/o PE and bradycardia, OA, radiculopathy, Vitamin D deficiency  Personal factors affecting pt at time of IE include: inaccessible home environment, step(s) to enter environment, multi-level environment, inability to perform IADLs, inability to perform ADLs, inability to navigate level surfaces without external assistance and inability to ambulate household distances  Pt will benefit from continued skilled PT services to address deficits as defined above and to maximize level of functional mobility to facilitate return toward PLOF and improved QOL  From PT/mobility standpoint, recommendation at time of d/c would be Home PT, home with family support and with walker pending progress in order to reduce fall risk and maximize pt's functional independence and consistency with mobility in order to facilitate return to PLOF  If patient with limited progress or unavailable assistance from son, may need to consider post acute rehab  Recommend ther ex next 1-2 sessions       The patient's AM-PAC Basic Mobility Inpatient Short Form Raw Score is 20, Standardized Score is 43 99  A standardized score greater than 42 9 suggests the patient may benefit from discharge to home  Please also refer to the recommendation of the Physical Therapist for safe discharge planning  Goals: Pt will: Perform bed mobility tasks sit to supine with modified I to reposition in bed and prepare for transfers  Pt will perform transfers with modified I to decrease burden of care, decrease risk for falls and improve activity tolerance and prepare for ambulation  Pt will ambulate with LRAD for >/= 150' with  modified I  to decrease burden of care, decrease risk for falls, improve activity tolerance and improve gait quality and to access home environment  Pt will complete >/= 12 steps with with unilateral handrail with modified I to decrease burden of care, return to home with ELEONORA, return to multilevel home, decrease risk for falls and improve activity tolerance  Pt will participate in objective balance assessment to determine baseline fall risk  Pt will increase B LE strength >/= 1/2 MMT grade to facilitate functional mobility  Elizabeth Renae, PT,DPT     PHYSICAL THERAPY TREATMENT NOTE  Time [id number]  Time [id number]  Total Time: 21      S:  "I am starting to feel light headed " (reported after 8' sitting)  O:  Sit<>stand with supervision  Min cues for hand placement    Ambulated 14'x1 with RW with stand by assistance  Min cues for increased step length and foot clearance  Pt with significantly slowed juliet, decreased foot clearance and step length  Further mobility deferred due to c/o lightheadedness with orthostatic hypotension  Bp slightly increased after ambulation to recliner  Stairs deferred due to hypotension       08/09/21 0932 08/09/21 0936 08/09/21 0938   Vitals   Pulse 85 74 75   Blood Pressure (!) 89/58  (seated on toilet - "lightheaded") 90/52  (Seated on toilet - "getting better") 91/64  (Seated on toielt - "better_) MAP (mmHg) 68 64 71   BP Location   (right forearm)   (right forearm)   (left forearm)   Oxygen Therapy   SpO2 95 % 97 % 96 %   SpO2 Activity At Rest At Rest At Rest   O2 Device None (Room air) None (Room air) None (Room air)      08/09/21 0945   Vitals   Pulse 70   Blood Pressure 102/67  (Seated in recliner chair - "pain in my neck")   MAP (mmHg) 80   BP Location   (Left forearm)   Oxygen Therapy   SpO2 96 %   SpO2 Activity At Rest   O2 Device None (Room air)     Pt reports her son will be able to assist her with stair completion upon return to home  A:  Pt requires increased verbal cues for hand placement for safety with transfers  Pt with onset of c/o lightheadedness  Bps taken and pt hypotensive  She reports decreased lightheadedness as time increased  SBP holding high 80s to low 90s and lightheadedness resolved after returning to recliner  She is limited this date by hypotension  She will continue to benefit from PT services to maximize LOF  P:  Recommend stair trial next session as appropriate  Transfers and gait with LESVIA Velasquez, PT, DPT

## 2021-08-09 NOTE — PLAN OF CARE
Problem: OCCUPATIONAL THERAPY ADULT  Goal: Performs self-care activities at highest level of function for planned discharge setting  See evaluation for individualized goals  Description: Treatment Interventions: ADL retraining, Functional transfer training, UE strengthening/ROM, Endurance training, Equipment evaluation/education, Patient/family training, Neuromuscular reeducation, Compensatory technique education, Continued evaluation, Energy conservation, Activityengagement          See flowsheet documentation for full assessment, interventions and recommendations  Note: Limitation: Decreased ADL status, Decreased UE strength, Decreased Safe judgement during ADL, Decreased endurance, Decreased self-care trans, Decreased high-level ADLs  Prognosis: Good  Assessment: Pt is an 78 /yo F admitted to [address] [place] [address] 8/5/2021 d/t experiencing symptomatic anemia d/t GI bleed  Dx: hemorrhagic shock d/t GI bleed  Pt with PMHx impacting performance during functional tasks including: anemia, a-fib, bradycardic, GERD, hx transfusion, osteoarthritis, radiculopathy, PE  Pt reports living at home with her son in a 2 story home with 13 [address]  Pt ambulates with SPC at baseline and reports completing ADLs, light IADLs and community mobility + driving @ Mod I  On evaluation, Pt A&Ox4  Pt completing supine>sit @ Mod I  UB dressing and grooming tasks @ S  LB dressing @ Min A  Pt completing STS and SPT @ S with use of RW for UB support  Pt's BUE ROM and MS WFL  Pt with c/o lightheadedness during session, please refer to chart below  Pt starting on 2L of O2 at start of session, satting at 99%  Pt decreased to RA during session, satting >92%  RN aware   Pt's barriers to d/c include: decrease activity tolerance, decrease standing balance, decrease performance during ADL tasks, decrease safety awareness , decrease UB MS, increased pain, decrease generalized strength, decrease activity engagement, decrease performance during functional transfers and steps to enter home  Pt would benefit from continued acute OT services to address deficits as well as HHOT upon d/c from [facility]       OT Discharge Recommendation: Home with home health rehabilitation

## 2021-08-09 NOTE — CASE MANAGEMENT
Case Management Progress Note    Patient name Rogelio Roca  Location /-43 MRN [id number]  : 1940 Date 2021       LOS (days): 4  Geometric Mean LOS (GMLOS) (days): 4 40  Days to GMLOS:0        BUNDLE:      OBJECTIVE:  Pt is a 78y o  year old , white or  [1], female with [facility] of [address] admitted on 955  4:02 AM  Pt is admitted to -[address] at [address] [place] [address] with complaints of Hemorrhagic shock due to gastrointestinal bleeding   Current admission status: Inpatient  Preferred Pharmacy:   [address] [facility] [address]  Phone: [phone] Fax: [phone]    Primary Care Provider: Sulaiman Young DO    Primary Insurance: MEDICARE  Secondary Insurance:     PROGRESS NOTE:    Plan is repeat EGD and Colonoscopy tomorrow  Per therapy patient may be safe for dc home, they need to evaluate on the steps  CM spoke to patient and she is hoping to go home, she would like [facility] upon dc, as she does not want to return to [facility] unless she really needs to-- Early referral made to [facility] for Nursing/PT and OT-- CM will follow up with therapy recommendations on the steps--  Dc plan STR vs [facility] depending on functional evaluation on the steps

## 2021-08-09 NOTE — ASSESSMENT & PLAN NOTE
· Secondary to blood loss anemia  Was dizzy this morning  · If persistent symptoms will transfuse again  Monitor with metoprolol use for rate control of atrial fibrillation

## 2021-08-09 NOTE — PROGRESS NOTES
[facility]  Progress Note - Al Pearce 78 y o  female MRN: [id number]    Unit/Bed#: -01 Encounter: [id number]    Assessment/Plan:  Acute gastrointestinal hemorrhage  Peptic ulcer disease  Supratherapeutic INR  Acute anemia  -status post reversal of anticoagulation and blood resuscitation  -status post EGD on 08/05 which revealed 2 clean based ulcers in the duodenum(< 5% risk of rebleed)  -hemoglobin relatively stable however still with persistent melena and Coumadin is still on hold however INR is mildly elevated but still subtherapeutic  -given persistence of her melena and tachycardia with need to resume oral anticoagulation will pursue repeat EGD and colonoscopy tomorrow  -clear liquid diet  -split dose bowel prep  -continue PPI b i d   -transfuse for hemoglobin less than 7    Subjective:   Patient seen examined  Denies nausea, vomiting tolerating regular diet  Persistent melena  Denies abdominal pain    Objective:     Vitals: Blood pressure 105/73, pulse 100, temperature 97 9 °F (36 6 °C), temperature source Oral, resp  rate 18, height 5' 4" (1 626 m), weight 93 7 kg (206 lb 9 1 oz), SpO2 93 %  ,Body mass index is 35 46 kg/m²  Intake/Output Summary (Last 24 hours) at 8/9/2021 1444  Last data filed at 8/9/2021 1400  Gross per 24 hour   Intake 480 ml   Output 900 ml   Net -420 ml       Review of Systems: as per HPI  Review of Systems   Gastrointestinal: Negative for abdominal pain, nausea and vomiting  Physical Exam:     Physical Exam  HENT:      Head: Normocephalic  Eyes:      General: No scleral icterus  Cardiovascular:      Rate and Rhythm: Tachycardia present  Rhythm irregular  Pulmonary:      Effort: Pulmonary effort is normal    Abdominal:      Palpations: Abdomen is soft  Tenderness: There is no abdominal tenderness  Musculoskeletal:      Cervical back: Neck supple  Skin:     Coloration: Skin is not jaundiced     Neurological:      Mental Status: She is alert and oriented to person, place, and time     Psychiatric:         Mood and Affect: Mood normal            Invasive Devices     Peripheral Intravenous Line            Peripheral IV 08/05/21 Left;Dorsal (posterior) Hand 4 days    Peripheral IV 08/09/21 Right;Lateral Wrist <1 day          Drain            External Urinary Catheter 3 days                        CBC:   Lab Results   Component Value Date    WBC 6 91 08/09/2021    HGB 7 8 (L) 08/09/2021    HCT 25 8 (L) 08/09/2021    MCV 93 08/09/2021     08/09/2021    MCH 28 0 08/09/2021    MCHC 30 2 (L) 08/09/2021    RDW 17 5 (H) 08/09/2021    MPV 10 0 08/09/2021   ,   CMP:   Lab Results   Component Value Date    K 4 2 08/09/2021     08/09/2021    CO2 29 08/09/2021    BUN 8 08/09/2021    CREATININE 0 86 08/09/2021    CALCIUM 7 7 (L) 08/09/2021    AST 25 08/09/2021    ALT 17 08/09/2021    ALKPHOS 77 08/09/2021    EGFR 63 08/09/2021   ,   Lipase: No results found for: LIPASE,  PT/INR:   Lab Results   Component Value Date    INR 1 76 (H) 08/09/2021

## 2021-08-10 ENCOUNTER — ANESTHESIA EVENT (INPATIENT)
Dept: GASTROENTEROLOGY | Facility: HOSPITAL | Age: 81
DRG: 813 | End: 2021-08-10
Payer: MEDICARE

## 2021-08-10 ENCOUNTER — ANESTHESIA (INPATIENT)
Dept: GASTROENTEROLOGY | Facility: HOSPITAL | Age: 81
DRG: 813 | End: 2021-08-10
Payer: MEDICARE

## 2021-08-10 ENCOUNTER — APPOINTMENT (OUTPATIENT)
Dept: GASTROENTEROLOGY | Facility: HOSPITAL | Age: 81
DRG: 813 | End: 2021-08-10
Attending: INTERNAL MEDICINE
Payer: MEDICARE

## 2021-08-10 LAB
ANION GAP SERPL CALCULATED.3IONS-SCNC: 8 MMOL/L (ref 4–13)
ATRIAL RATE: 125 BPM
BACTERIA BLD CULT: NORMAL
BACTERIA BLD CULT: NORMAL
BUN SERPL-MCNC: 11 MG/DL (ref 5–25)
CALCIUM SERPL-MCNC: 7.5 MG/DL (ref 8.3–10.1)
CHLORIDE SERPL-SCNC: 104 MMOL/L (ref 100–108)
CO2 SERPL-SCNC: 27 MMOL/L (ref 21–32)
CREAT SERPL-MCNC: 0.9 MG/DL (ref 0.6–1.3)
ERYTHROCYTE [DISTWIDTH] IN BLOOD BY AUTOMATED COUNT: 17.3 % (ref 11.6–15.1)
GFR SERPL CREATININE-BSD FRML MDRD: 59 ML/MIN/1.73SQ M
GLUCOSE SERPL-MCNC: 87 MG/DL (ref 65–140)
HCT VFR BLD AUTO: 24.8 % (ref 34.8–46.1)
HCT VFR BLD AUTO: 28.7 % (ref 34.8–46.1)
HGB BLD-MCNC: 7.7 G/DL (ref 11.5–15.4)
HGB BLD-MCNC: 9 G/DL (ref 11.5–15.4)
INR PPP: 1.84 (ref 0.84–1.19)
MCH RBC QN AUTO: 28.5 PG (ref 26.8–34.3)
MCHC RBC AUTO-ENTMCNC: 31 G/DL (ref 31.4–37.4)
MCV RBC AUTO: 92 FL (ref 82–98)
PLATELET # BLD AUTO: 198 THOUSANDS/UL (ref 149–390)
PMV BLD AUTO: 10.3 FL (ref 8.9–12.7)
POTASSIUM SERPL-SCNC: 3.3 MMOL/L (ref 3.5–5.3)
PROTHROMBIN TIME: 20.9 SECONDS (ref 11.6–14.5)
QRS AXIS: 70 DEGREES
QRSD INTERVAL: 88 MS
QT INTERVAL: 342 MS
QTC INTERVAL: 505 MS
RBC # BLD AUTO: 2.7 MILLION/UL (ref 3.81–5.12)
SODIUM SERPL-SCNC: 139 MMOL/L (ref 136–145)
T WAVE AXIS: -65 DEGREES
VENTRICULAR RATE: 131 BPM
WBC # BLD AUTO: 5.94 THOUSAND/UL (ref 4.31–10.16)

## 2021-08-10 PROCEDURE — 85018 HEMOGLOBIN: CPT | Performed by: FAMILY MEDICINE

## 2021-08-10 PROCEDURE — 85610 PROTHROMBIN TIME: CPT | Performed by: INTERNAL MEDICINE

## 2021-08-10 PROCEDURE — 0DJ08ZZ INSPECTION OF UPPER INTESTINAL TRACT, VIA NATURAL OR ARTIFICIAL OPENING ENDOSCOPIC: ICD-10-PCS | Performed by: INTERNAL MEDICINE

## 2021-08-10 PROCEDURE — 99232 SBSQ HOSP IP/OBS MODERATE 35: CPT | Performed by: FAMILY MEDICINE

## 2021-08-10 PROCEDURE — 0DJD8ZZ INSPECTION OF LOWER INTESTINAL TRACT, VIA NATURAL OR ARTIFICIAL OPENING ENDOSCOPIC: ICD-10-PCS | Performed by: INTERNAL MEDICINE

## 2021-08-10 PROCEDURE — 80048 BASIC METABOLIC PNL TOTAL CA: CPT | Performed by: INTERNAL MEDICINE

## 2021-08-10 PROCEDURE — 85027 COMPLETE CBC AUTOMATED: CPT | Performed by: INTERNAL MEDICINE

## 2021-08-10 PROCEDURE — 93010 ELECTROCARDIOGRAM REPORT: CPT | Performed by: INTERNAL MEDICINE

## 2021-08-10 PROCEDURE — 97116 GAIT TRAINING THERAPY: CPT

## 2021-08-10 PROCEDURE — 97530 THERAPEUTIC ACTIVITIES: CPT

## 2021-08-10 PROCEDURE — 85014 HEMATOCRIT: CPT | Performed by: FAMILY MEDICINE

## 2021-08-10 RX ORDER — WARFARIN SODIUM 5 MG/1
5 TABLET ORAL
Status: COMPLETED | OUTPATIENT
Start: 2021-08-10 | End: 2021-08-10

## 2021-08-10 RX ORDER — FERROUS SULFATE 325(65) MG
325 TABLET ORAL
Status: DISCONTINUED | OUTPATIENT
Start: 2021-08-11 | End: 2021-08-13 | Stop reason: HOSPADM

## 2021-08-10 RX ORDER — PROPOFOL 10 MG/ML
INJECTION, EMULSION INTRAVENOUS AS NEEDED
Status: DISCONTINUED | OUTPATIENT
Start: 2021-08-10 | End: 2021-08-10

## 2021-08-10 RX ORDER — SODIUM CHLORIDE, SODIUM LACTATE, POTASSIUM CHLORIDE, CALCIUM CHLORIDE 600; 310; 30; 20 MG/100ML; MG/100ML; MG/100ML; MG/100ML
INJECTION, SOLUTION INTRAVENOUS CONTINUOUS PRN
Status: DISCONTINUED | OUTPATIENT
Start: 2021-08-10 | End: 2021-08-10

## 2021-08-10 RX ORDER — ONDANSETRON 2 MG/ML
4 INJECTION INTRAMUSCULAR; INTRAVENOUS ONCE AS NEEDED
Status: DISCONTINUED | OUTPATIENT
Start: 2021-08-10 | End: 2021-08-10 | Stop reason: HOSPADM

## 2021-08-10 RX ORDER — POTASSIUM CHLORIDE 20 MEQ/1
20 TABLET, EXTENDED RELEASE ORAL ONCE
Status: COMPLETED | OUTPATIENT
Start: 2021-08-10 | End: 2021-08-10

## 2021-08-10 RX ADMIN — POTASSIUM CHLORIDE 20 MEQ: 1500 TABLET, EXTENDED RELEASE ORAL at 17:08

## 2021-08-10 RX ADMIN — PROPOFOL 80 MG: 10 INJECTION, EMULSION INTRAVENOUS at 13:27

## 2021-08-10 RX ADMIN — SODIUM CHLORIDE, SODIUM LACTATE, POTASSIUM CHLORIDE, AND CALCIUM CHLORIDE: .6; .31; .03; .02 INJECTION, SOLUTION INTRAVENOUS at 13:03

## 2021-08-10 RX ADMIN — PROPOFOL 80 MG: 10 INJECTION, EMULSION INTRAVENOUS at 13:16

## 2021-08-10 RX ADMIN — PANTOPRAZOLE SODIUM 40 MG: 40 TABLET, DELAYED RELEASE ORAL at 17:08

## 2021-08-10 RX ADMIN — METOPROLOL SUCCINATE 50 MG: 50 TABLET, EXTENDED RELEASE ORAL at 08:38

## 2021-08-10 RX ADMIN — PROPOFOL 60 MG: 10 INJECTION, EMULSION INTRAVENOUS at 13:21

## 2021-08-10 RX ADMIN — PANTOPRAZOLE SODIUM 40 MG: 40 TABLET, DELAYED RELEASE ORAL at 08:38

## 2021-08-10 RX ADMIN — WARFARIN SODIUM 5 MG: 5 TABLET ORAL at 17:08

## 2021-08-10 RX ADMIN — PROPOFOL 50 MG: 10 INJECTION, EMULSION INTRAVENOUS at 13:34

## 2021-08-10 RX ADMIN — PROPOFOL 80 MG: 10 INJECTION, EMULSION INTRAVENOUS at 13:09

## 2021-08-10 RX ADMIN — PRAVASTATIN SODIUM 20 MG: 20 TABLET ORAL at 17:08

## 2021-08-10 RX ADMIN — PROPOFOL 50 MG: 10 INJECTION, EMULSION INTRAVENOUS at 13:30

## 2021-08-10 NOTE — ANESTHESIA PREPROCEDURE EVALUATION
Procedure:  COLONOSCOPY  EGD  ECG on presentation afib RVR  Acute anemia with elevated lactic acid last week on presentation  Hbg stable last 4 days ranging from 7 6-7 8 today 7 7  On 8/5 had 2 clean based ulcers in duodenum  Relevant Problems   CARDIO   (+) Atrial fibrillation (HCC)   (+) Hyperlipidemia   (+) Hypertension      HEMATOLOGY   (+) Hemorrhagic shock due to gastrointestinal bleeding             Anesthesia Plan  ASA Score- 2     Anesthesia Type- IV sedation with anesthesia with ASA Monitors  Additional Monitors:   Airway Plan:           Plan Factors-Exercise tolerance (METS): >4 METS  Chart reviewed  EKG reviewed  Existing labs reviewed  Patient summary reviewed  Patient is not a current smoker  Obstructive sleep apnea risk education given perioperatively  Induction- intravenous  Postoperative Plan-     Informed Consent- Anesthetic plan and risks discussed with patient  I personally reviewed this patient with the CRNA  Discussed and agreed on the Anesthesia Plan with the CRNA  Shar Schmidt

## 2021-08-10 NOTE — ANESTHESIA POSTPROCEDURE EVALUATION
Post-Op Assessment Note    CV Status:  Stable  Pain Score: 0    Pain management: adequate     Mental Status:  Alert and awake   Hydration Status:  Euvolemic   PONV Controlled:  Controlled   Airway Patency:  Patent      Post Op Vitals Reviewed: Yes      Staff: CRNA         No complications documented      BP   124/68   Temp      Pulse  97   Resp   18   SpO2  99

## 2021-08-10 NOTE — ASSESSMENT & PLAN NOTE
· Melanotic stool on admission with acute blood loss anemia requiring 2 units PRBC  · Status post EGD with nonbleeding duodenal ulcers  · Hemoglobin for the past 2 day has been anywhere between 7 77 8  Secondary to restarting anticoagulation patient is to proceed with EGD and colonoscopy today again  Discussed with patient she does no have any more a melena  Post EGD and colonoscopy in 24 hours will place patient on iron  · Transfuse if less than 7   Repeat hemoglobin tonight  Results from last 7 days   Lab Units 08/10/21  0607 08/09/21  0604 08/08/21  0447 08/07/21  0547 08/06/21  1954 08/06/21  1422 08/06/21  0559 08/05/21  1758 08/05/21  0948 08/05/21  0407   HEMOGLOBIN g/dL 7 7* 7 8* 7 6* 7 7* 8 0* 8 8* 8 0* 8 4* 7 8* 7 5*

## 2021-08-10 NOTE — PHYSICAL THERAPY NOTE
PHYSICAL THERAPY TREATMENT NOTE  NAME:  Rafael Fregoso  DATE: 08/10/21    Length Of Stay: 5  Performed at least 2 patient identifiers during session: Name and Birthday    TREATMENT:      08/10/21 1107   PT Last Visit   PT Visit Date 08/10/21   Note Type   Note Type Treatment   Pain Assessment   Pain Assessment Tool Pain Assessment not indicated - pt denies pain   Pain Score No Pain   Restrictions/Precautions   Other Precautions Chair Alarm; Bed Alarm;Pain; Fall Risk;O2   General   Chart Reviewed Yes   Response to Previous Treatment Patient with no complaints from previous session  Family/Caregiver Present No   Cognition   Arousal/Participation Alert; Responsive   Orientation Level Oriented X4   Following Commands Follows one step commands without difficulty   Subjective   Subjective "I have a colonoscopy I am going to soon"   Bed Mobility   Supine to Sit 6  Modified independent   Additional items Increased time required;HOB elevated   Additional Comments HOB elevated, no bed rails and requires no vc   Transfers   Sit to Stand 5  Supervision   Additional items Assist x 1; Increased time required;Verbal cues   Stand to Sit 5  Supervision   Additional items Assist x 1;Trapeze bar;Verbal cues   Additional Comments Pt completing all transfers with use of RW  Pt did require assistance with LE with getting on/off staxi due to pts gown making it hard to move  Ambulation/Elevation   Distance Pt using staxi chair to go to/from stairs  Pt agreeable to walk but had to go down for colonoscopy  Pt with increased dizziness after stair trial requiring sitting rest break ~5' for symptoms to resolve  Stair Management Assistance 4  Minimal assist   Additional items Assist x 1;Verbal cues   Stair Management Technique Two rails; Step to pattern; Foreward   Number of Stairs 5   Balance   Static Sitting Good   Dynamic Sitting Fair +   Static Standing Fair   Dynamic Standing Fair -   Ambulatory Fair -   Endurance Deficit   Endurance Deficit Yes   Endurance Deficit Description SpO2 on 2lpm   Activity Tolerance   Activity Tolerance Patient limited by fatigue   Nurse Made Aware RN Juan Antonio   Assessment   Prognosis Good   Problem List Decreased strength;Decreased endurance; Impaired balance;Decreased mobility; Decreased safety awareness; Obesity;Pain   Barriers to Discharge Decreased caregiver support; Inaccessible home environment   Goals   Patient Goals "Go home"   PT Treatment Day 2   Plan   Treatment/Interventions Functional transfer training;LE strengthening/ROM; Elevations; Therapeutic exercise; Endurance training;Patient/family training;Equipment eval/education; Bed mobility;Gait training   Progress Slow progress, decreased activity tolerance   PT Frequency   (3-5x/wk)   Recommendation   PT Discharge Recommendation Home with home health rehabilitation   Equipment Recommended   (Walker-pt has)   PT - OK to Discharge Yes   Additional Comments When medically cleared   Additional Comments 2 Pt taken for colonoscopy   AM-PAC Basic Mobility Inpatient   Turning in Bed Without Bedrails 4   Lying on Back to Sitting on Edge of Flat Bed 4   Moving Bed to Chair 3   Standing Up From Chair 3   Walk in Room 3   Climb 3-5 Stairs 3   Basic Mobility Inpatient Raw Score 20   Basic Mobility Standardized Score 43 99     The patient's AM-PAC Basic Mobility Inpatient Short Form Raw Score is 20, Standardized Score is 43 99  A standardized score greater than 42 9 suggests the patient may benefit from discharge to home  Please also refer to the recommendation of the Physical Therapist for safe discharge planning  Pt seen for PT treatment session this date with interventions consisting of therapeutic activity consisting of training: bed mobility, supine<>sit transfers and sit<>stand transfers and navigating 5 stairs w/ B handrail with step to pattern with min A  Pt agreeable to PT treatment session upon arrival, pt found supine in bed w/ HOB elevated, in no apparent distress  In comparison to previous session, pt trialing step management with education on correct sequencing and technique  Pt able to demonstrate carryover but with increased lightheadedness requiring seated rest breaks  Pt able to demonstrate ~5' of static standing balance for assistance with personal hygiene and reaching contralaterally and unilaterally across midline with sitting  Pt agreeable to ambulation but had to be deferred due to getting colonoscopy  Continue to recommend Home PT at time of d/c in order to maximize pt's functional independence and safety w/ mobility  Pt continues to be functioning below baseline level, and remains limited 2* factors listed above and including  PT will continue to see pt while here in order to address the deficits listed above and provide interventions consistent w/ POC in effort to achieve STGs      Monica Alex, PTA

## 2021-08-10 NOTE — ASSESSMENT & PLAN NOTE
· Secondary to blood loss anemia  Was dizzy this morning  · If persistent symptoms will transfuse again  Monitor with metoprolol use for rate control of atrial fibrillation    Blood pressure is normal today at 130

## 2021-08-10 NOTE — PLAN OF CARE
Problem: PHYSICAL THERAPY ADULT  Goal: Performs mobility at highest level of function for planned discharge setting  See evaluation for individualized goals  Description: Treatment/Interventions: Functional transfer training, LE strengthening/ROM, Elevations, Therapeutic exercise, Endurance training, Patient/family training, Bed mobility, Equipment eval/education, Gait training, Compensatory technique education, Spoke to nursing, Spoke to case management, OT  Equipment Recommended:  (walker-pt reports having)       See flowsheet documentation for full assessment, interventions and recommendations  Outcome: Progressing  Note: Prognosis: Good  Problem List: Decreased strength, Decreased endurance, Impaired balance, Decreased mobility, Decreased safety awareness, Obesity, Pain  Assessment: Pt seen for PT treatment session this date with interventions consisting of therapeutic activity consisting of training: bed mobility, supine<>sit transfers and sit<>stand transfers and navigating 5 stairs w/ B handrail with step to pattern with min A  Pt agreeable to PT treatment session upon arrival, pt found supine in bed w/ HOB elevated, in no apparent distress  In comparison to previous session, pt trialing step management with education on correct sequencing and technique  Pt able to demonstrate carryover but with increased lightheadedness requiring seated rest breaks  Pt able to demonstrate ~5' of static standing balance for assistance with personal hygiene and reaching contralaterally and unilaterally across midline with sitting  Pt agreeable to ambulation but had to be deferred due to getting colonoscopy  Continue to recommend Home PT at time of d/c in order to maximize pt's functional independence and safety w/ mobility  Pt continues to be functioning below baseline level, and remains limited 2* factors listed above and including   PT will continue to see pt while here in order to address the deficits listed above and provide interventions consistent w/ POC in effort to achieve STGs  Barriers to Discharge: Decreased caregiver support, Inaccessible home environment  Barriers to Discharge Comments: pt has 13 steps to main level  Per son pt can go in back way if needed, but has FF to 2nd floor  Pt reports she can stay on 1st floor if needed  decreased activity tolerance  PT Discharge Recommendation: Home with home health rehabilitation     PT - OK to Discharge: Yes    See flowsheet documentation for full assessment

## 2021-08-10 NOTE — H&P
History and Physical - [facility]  Les Stevens 78 y o  female MRN: [id number]                  HPI: Les Stevens is a 78y o  year old female who presents for EGD/colonoscopy for evaluation of persistent melena and anemia  EGD on 08/05 showed 2 clean based ulcer in duodenal bulb/sweep  INR 1 84, hemoglobin 7 7, platelets 699  REVIEW OF SYSTEMS: Per the HPI, and otherwise unremarkable  Historical Information   Past Medical History:   Diagnosis Date    Anemia     Atrial fibrillation (HCC)     Bradycardia     GERD (gastroesophageal reflux disease)     History of pulmonary embolus (PE)     History of transfusion     Hypertension     Osteoarthritis     Radiculopathy     Vitamin D deficiency      Past Surgical History:   Procedure Laterality Date    APPENDECTOMY      BACK SURGERY      EYE SURGERY      JOINT REPLACEMENT       Social History   Social History     Substance and Sexual Activity   Alcohol Use Not Currently    Alcohol/week: 0 0 standard drinks    Comment: 0     Social History     Substance and Sexual Activity   Drug Use Never     Social History     Tobacco Use   Smoking Status Never Smoker   Smokeless Tobacco Never Used     History reviewed  No pertinent family history  Meds/Allergies     Medications Prior to Admission   Medication    acetaminophen (TYLENOL) 325 mg tablet    ferrous sulfate 325 (65 Fe) mg tablet    Latanoprost 0 005 % EMUL    loperamide (Imodium A-D) 2 mg capsule    metoprolol succinate (TOPROL-XL) 50 mg 24 hr tablet    predniSONE 20 mg tablet    simvastatin (ZOCOR) 10 mg tablet    warfarin (COUMADIN) 7 5 mg tablet       Allergies   Allergen Reactions    Nsaids GI Intolerance     ulcers       Objective     Blood pressure 165/87, pulse 100, temperature 97 7 °F (36 5 °C), temperature source Axillary, resp  rate 18, height 5' 4" (1 626 m), weight 93 4 kg (206 lb), SpO2 98 %        PHYSICAL EXAM    Gen: NAD  CV: RRR  CHEST: Clear  ABD: soft, NT/ND  EXT: no edema      ASSESSMENT/PLAN:  This is a 78y o  year old female here for EGD/colonoscopy for evaluation of persistent melena and anemia  Patient is stable and optimized for procedure      PLAN:   Procedure:  EGD/colonoscopy

## 2021-08-10 NOTE — CASE MANAGEMENT
Case Management Progress Note    Patient name Eusebio Vernon  Location /-24 MRN [id number]  : 1940 Date 8/10/2021       LOS (days): 5  Geometric Mean LOS (GMLOS) (days): 4 40  Days to GMLOS:-0 9        BUNDLE:      OBJECTIVE:  Pt is a 78y o  year old , white or  [1], female with [facility]oda[facility] preference of [address] admitted on 8194  4:02 AM  Pt is admitted to -01 at [address] [place] [address] with complaints of Hemorrhagic shock due to gastrointestinal bleeding   Current admission status: Inpatient  Preferred Pharmacy:   [address] [facility] [address] [id number]  Phone: [phone] Fax: [phone]    Primary Care Provider: Anthony Sun DO    Primary Insurance: MEDICARE  Secondary Insurance:     PROGRESS NOTE:  Not ready for dc  Patient to be scoped  Noting patient will be safe for dc home with home care services  [facility] Melania Miranda has accepted upon dc, AVS and face to face was updated  CM will continue to follow

## 2021-08-10 NOTE — PROGRESS NOTES
[address]  Progress Note Yumiko Jain 1940, 78 y o  female MRN: [id number]  Unit/Bed#: -01 Encounter: [id number]  Primary Care Provider: Manjula Acosta DO   Date and time admitted to hospital: 8/5/2021  4:02 AM    Hypotension (arterial)  Assessment & Plan  · Secondary to blood loss anemia  Was dizzy this morning  · If persistent symptoms will transfuse again  Monitor with metoprolol use for rate control of atrial fibrillation  Blood pressure is normal today at 130    Hyperlipidemia  Assessment & Plan  · Simvastatin substituted with pravastatin based on hospital formulary    Atrial fibrillation [facility])  Assessment & Plan  · Continue metoprolol  INR supratherapeutic on admission requiring vitamin K and Kcentra INR is subtherapeutic at 1 8  · Will restart Coumadin once cleared by GI she is going to EGD and colonoscopy prior to that    Results from last 7 days   Lab Units 08/10/21  0607 08/09/21  0604 08/08/21  0447 08/07/21  0547 08/06/21  0559 08/05/21  0948 08/05/21  0407   INR  1 84* 1 76* 1 94* 1 82* 1 29* 1 67* 5 49*       SIRS (systemic inflammatory response syndrome) (HCC)  Assessment & Plan  · Secondary to GI bleeding and hypotension  No infection  Resolved    * Hemorrhagic shock due to gastrointestinal bleeding  Assessment & Plan  · Melanotic stool on admission with acute blood loss anemia requiring 2 units PRBC  · Status post EGD with nonbleeding duodenal ulcers  · Hemoglobin for the past 2 day has been anywhere between 7 77 8  Secondary to restarting anticoagulation patient is to proceed with EGD and colonoscopy today again  Discussed with patient she does no have any more a melena  Post EGD and colonoscopy in 24 hours will place patient on iron  · Transfuse if less than 7   Repeat hemoglobin tonight  Results from last 7 days   Lab Units 08/10/21  0607 08/09/21  0604 08/08/21  0447 08/07/21  0547 08/06/21  1954 08/06/21  1422 08/06/21  0559 08/05/21  1758 21  0948 21  0407   HEMOGLOBIN g/dL 7 7* 7 8* 7 6* 7 7* 8 0* 8 8* 8 0* 8 4* 7 8* 7 5*             VTE Pharmacologic Prophylaxis: VTE Score: 7 High Risk (Score >/= 5) - Pharmacological DVT Prophylaxis Contraindicated  Sequential Compression Devices Ordered  Patient Centered Rounds: I performed bedside rounds with nursing staff today  Discussions with Specialists or Other Care Team Provider:  Will discuss with Gastroenterology    Education and Discussions with Family / Patient: Updated  (son) via phone  Time Spent for Care: 30 minutes  More than 50% of total time spent on counseling and coordination of care as described above  Current Length of Stay: 5 day(s)  Current Patient Status: Inpatient   Certification Statement: The patient will continue to require additional inpatient hospital stay due to Secondary to GI bleed  Discharge Plan: Anticipate discharge in 48 hrs to discharge location to be determined pending rehab evaluations  Code Status: Level 1 - Full Code    Subjective:   Patient seen and examined no chest pain or shortness of breath denies any melena 1 she had a bowel prep no abdominal pain    Objective:     Vitals:   Temp (24hrs), Av 1 °F (36 7 °C), Min:97 8 °F (36 6 °C), Max:98 6 °F (37 °C)    Temp:  [97 8 °F (36 6 °C)-98 6 °F (37 °C)] 98 6 °F (37 °C)  HR:  [] 92  Resp:  [18-21] 21  BP: ()/(52-75) 139/64  SpO2:  [88 %-97 %] 97 %  Body mass index is 35 46 kg/m²  Input and Output Summary (last 24 hours): Intake/Output Summary (Last 24 hours) at 8/10/2021 1134  Last data filed at 8/10/2021 0800  Gross per 24 hour   Intake 990 ml   Output 400 ml   Net 590 ml       Physical Exam:   Physical Exam  Vitals and nursing note reviewed  Constitutional:       General: She is not in acute distress  Appearance: She is well-developed  HENT:      Head: Normocephalic and atraumatic     Eyes:      Conjunctiva/sclera: Conjunctivae normal    Cardiovascular: Rate and Rhythm: Normal rate  Rhythm irregular  Heart sounds: No murmur heard  Pulmonary:      Effort: Pulmonary effort is normal  No respiratory distress  Breath sounds: Normal breath sounds  No wheezing or rales  Abdominal:      General: There is no distension  Palpations: Abdomen is soft  Tenderness: There is no abdominal tenderness  Musculoskeletal:         General: No swelling  Cervical back: Neck supple  Skin:     General: Skin is warm and dry  Neurological:      General: No focal deficit present  Mental Status: She is alert and oriented to person, place, and time  Mental status is at baseline  Psychiatric:         Mood and Affect: Mood normal          Additional Data:     Labs:  Results from last 7 days   Lab Units 08/10/21  0607 08/05/21  1758 08/05/21  0948   WBC Thousand/uL 5 94   < > 17 18*   HEMOGLOBIN g/dL 7 7*  --  7 8*   HEMATOCRIT % 24 8*  --  25 4*   PLATELETS Thousands/uL 198   < > 275   NEUTROS PCT %  --   --  73   LYMPHS PCT %  --   --  16   MONOS PCT %  --   --  9   EOS PCT %  --   --  0    < > = values in this interval not displayed       Results from last 7 days   Lab Units 08/10/21  0607 08/09/21  0604   SODIUM mmol/L 139 141   POTASSIUM mmol/L 3 3* 4 2   CHLORIDE mmol/L 104 104   CO2 mmol/L 27 29   BUN mg/dL 11 8   CREATININE mg/dL 0 90 0 86   ANION GAP mmol/L 8 8   CALCIUM mg/dL 7 5* 7 7*   ALBUMIN g/dL  --  2 3*   TOTAL BILIRUBIN mg/dL  --  1 16*   ALK PHOS U/L  --  77   ALT U/L  --  17   AST U/L  --  25   GLUCOSE RANDOM mg/dL 87 96     Results from last 7 days   Lab Units 08/10/21  0607   INR  1 84*             Results from last 7 days   Lab Units 08/05/21  0641 08/05/21  0407   LACTIC ACID mmol/L 1 5 4 1*       Lines/Drains:  Invasive Devices     Peripheral Intravenous Line            Peripheral IV 08/05/21 Left;Dorsal (posterior) Hand 5 days    Peripheral IV 08/09/21 Right;Lateral Wrist 1 day          Drain            External Urinary Catheter 4 days                      Imaging: Reviewed radiology reports from this admission including: chest xray    Recent Cultures (last 7 days):   Results from last 7 days   Lab Units 08/05/21  0440   BLOOD CULTURE  No Growth After 5 Days  No Growth After 5 Days  Last 24 Hours Medication List:   Current Facility-Administered Medications   Medication Dose Route Frequency Provider Last Rate    acetaminophen  650 mg Oral Q6H PRN Hedy Waller PA-C      metoprolol succinate  50 mg Oral Daily Hedy Waller PA-C      ondansetron  4 mg Intravenous Q4H PRN [place] Flattery, DO      pantoprazole  40 mg Oral BID AC Hedy Waller PA-C      potassium chloride  20 mEq Oral Once Carla Cherry MD      pravastatin  20 mg Oral Daily With Malik Leal PA-C          Today, Patient Was Seen By: Carla Cherry MD    **Please Note: This note may have been constructed using a voice recognition system  **

## 2021-08-10 NOTE — QUICK NOTE
Initially consult on patient for hematemesis, hematochezia with acute anemia in the setting of supratherapeutic INR requiring reversal with Kcentra and vitamin K  She underwent EGD on 08/02 which revealed to force class 3 ulcers in duodenal bulb/sweep  She continued to have persistent melena with low hemoglobin and Coumadin was on hold therefore we elected to proceed with repeat EGD and colonoscopy today which did not reveal any active bleeding or signs of recent hemorrhage  EGD showed healing of duodenal ulcer and colonoscopy showed multiple polyps 1 large polyp that was pedunculated  These polyps were not removed due to recent GI bleed  Discussed with farrah Ramonyn Nasir and hospitalist team     Resume regular diet  Resume anticoagulation  Monitor for signs of active GI bleeding  Outpatient GI follow-up with me in 1 month to discuss timing of repeat colonoscopy with polypectomy  GI will sign off at this time  Please contact us with any further questions or concerns  ADDENDUM:  Pathology report without evidence of H pylori

## 2021-08-11 PROBLEM — G47.33 OSA (OBSTRUCTIVE SLEEP APNEA): Status: ACTIVE | Noted: 2021-08-11

## 2021-08-11 LAB
ANION GAP SERPL CALCULATED.3IONS-SCNC: 9 MMOL/L (ref 4–13)
BASOPHILS # BLD AUTO: 0.02 THOUSANDS/ΜL (ref 0–0.1)
BASOPHILS NFR BLD AUTO: 0 % (ref 0–1)
BUN SERPL-MCNC: 11 MG/DL (ref 5–25)
CALCIUM SERPL-MCNC: 7.6 MG/DL (ref 8.3–10.1)
CHLORIDE SERPL-SCNC: 107 MMOL/L (ref 100–108)
CO2 SERPL-SCNC: 28 MMOL/L (ref 21–32)
CREAT SERPL-MCNC: 0.93 MG/DL (ref 0.6–1.3)
EOSINOPHIL # BLD AUTO: 0.11 THOUSAND/ΜL (ref 0–0.61)
EOSINOPHIL NFR BLD AUTO: 2 % (ref 0–6)
ERYTHROCYTE [DISTWIDTH] IN BLOOD BY AUTOMATED COUNT: 17.2 % (ref 11.6–15.1)
GFR SERPL CREATININE-BSD FRML MDRD: 57 ML/MIN/1.73SQ M
GLUCOSE SERPL-MCNC: 84 MG/DL (ref 65–140)
HCT VFR BLD AUTO: 25.6 % (ref 34.8–46.1)
HGB BLD-MCNC: 7.9 G/DL (ref 11.5–15.4)
IMM GRANULOCYTES # BLD AUTO: 0.05 THOUSAND/UL (ref 0–0.2)
IMM GRANULOCYTES NFR BLD AUTO: 1 % (ref 0–2)
INR PPP: 1.9 (ref 0.84–1.19)
LYMPHOCYTES # BLD AUTO: 1.79 THOUSANDS/ΜL (ref 0.6–4.47)
LYMPHOCYTES NFR BLD AUTO: 33 % (ref 14–44)
MCH RBC QN AUTO: 28.8 PG (ref 26.8–34.3)
MCHC RBC AUTO-ENTMCNC: 30.9 G/DL (ref 31.4–37.4)
MCV RBC AUTO: 93 FL (ref 82–98)
MONOCYTES # BLD AUTO: 0.56 THOUSAND/ΜL (ref 0.17–1.22)
MONOCYTES NFR BLD AUTO: 10 % (ref 4–12)
NEUTROPHILS # BLD AUTO: 2.92 THOUSANDS/ΜL (ref 1.85–7.62)
NEUTS SEG NFR BLD AUTO: 54 % (ref 43–75)
NRBC BLD AUTO-RTO: 0 /100 WBCS
PLATELET # BLD AUTO: 226 THOUSANDS/UL (ref 149–390)
PMV BLD AUTO: 10 FL (ref 8.9–12.7)
POTASSIUM SERPL-SCNC: 4.2 MMOL/L (ref 3.5–5.3)
PROTHROMBIN TIME: 21.4 SECONDS (ref 11.6–14.5)
RBC # BLD AUTO: 2.74 MILLION/UL (ref 3.81–5.12)
SODIUM SERPL-SCNC: 144 MMOL/L (ref 136–145)
WBC # BLD AUTO: 5.45 THOUSAND/UL (ref 4.31–10.16)

## 2021-08-11 PROCEDURE — 80048 BASIC METABOLIC PNL TOTAL CA: CPT | Performed by: FAMILY MEDICINE

## 2021-08-11 PROCEDURE — 85025 COMPLETE CBC W/AUTO DIFF WBC: CPT | Performed by: FAMILY MEDICINE

## 2021-08-11 PROCEDURE — 99232 SBSQ HOSP IP/OBS MODERATE 35: CPT | Performed by: FAMILY MEDICINE

## 2021-08-11 PROCEDURE — 85610 PROTHROMBIN TIME: CPT | Performed by: FAMILY MEDICINE

## 2021-08-11 RX ORDER — ASCORBIC ACID 500 MG
500 TABLET ORAL DAILY
Status: DISCONTINUED | OUTPATIENT
Start: 2021-08-11 | End: 2021-08-13 | Stop reason: HOSPADM

## 2021-08-11 RX ORDER — WARFARIN SODIUM 2.5 MG/1
2.5 TABLET ORAL
Status: COMPLETED | OUTPATIENT
Start: 2021-08-11 | End: 2021-08-11

## 2021-08-11 RX ADMIN — METOPROLOL SUCCINATE 50 MG: 50 TABLET, EXTENDED RELEASE ORAL at 08:56

## 2021-08-11 RX ADMIN — WARFARIN SODIUM 2.5 MG: 2.5 TABLET ORAL at 17:27

## 2021-08-11 RX ADMIN — FERROUS SULFATE TAB 325 MG (65 MG ELEMENTAL FE) 325 MG: 325 (65 FE) TAB at 08:56

## 2021-08-11 RX ADMIN — OXYCODONE HYDROCHLORIDE AND ACETAMINOPHEN 500 MG: 500 TABLET ORAL at 12:19

## 2021-08-11 RX ADMIN — PRAVASTATIN SODIUM 20 MG: 20 TABLET ORAL at 17:27

## 2021-08-11 RX ADMIN — PANTOPRAZOLE SODIUM 40 MG: 40 TABLET, DELAYED RELEASE ORAL at 17:27

## 2021-08-11 RX ADMIN — PANTOPRAZOLE SODIUM 40 MG: 40 TABLET, DELAYED RELEASE ORAL at 08:56

## 2021-08-11 NOTE — CASE MANAGEMENT
Case Management Progress Note    Patient name Gracie Bartholomew  Location /-01 MRN [id number]  : 1940 Date 2021       LOS (days): 6  Geometric Mean LOS (GMLOS) (days): 4 40  Days to GMLOS:-1 9      Not ready for dc per MD in rounds  Continue to trend h/h's  MD anticipated dc will be Friday  CM spoke to patient and informed her that [facility] has accepted her for dc  PCP appointment was made and placed on the AVS     DC IMM was completed- copy of signed paper was placed in the bin to be scanned into the chart  CM will continue to follow      DC plan is [facility]  PCP appointment is 2021

## 2021-08-11 NOTE — ASSESSMENT & PLAN NOTE
· Melanotic stool on admission with acute blood loss anemia requiring 2 units PRBC  · Status post EGD with nonbleeding duodenal ulcers  · She is status post EGD and colonoscopy done on August 10th EGD showed no acute bleeding and ulcer is healing colonoscopy no acute bleeding but there is a large polyp that will need to be evaluated as outpatient in 1 month follow-up GI to discuss elective colonoscopy for polypectomy and biopsy  All was discussed with the patient's hemoglobin on August 10th 9 improved to 9 she has no more active bleed today trend down to 7 9 she is just started ferrous sulfate will add vitamin C for absorption  Will repeat a CBC to more to ensure stability    · Transfuse if less than 7  Results from last 7 days   Lab Units 08/11/21  0439 08/10/21  1820 08/10/21  0607 08/09/21  0604 08/08/21  0447 08/07/21  0547 08/06/21  1954 08/06/21  1422 08/06/21  0559 08/05/21  1758 08/05/21  0948 08/05/21  0407   HEMOGLOBIN g/dL 7 9* 9 0* 7 7* 7 8* 7 6* 7 7* 8 0* 8 8* 8 0* 8 4* 7 8* 7 5*

## 2021-08-11 NOTE — PLAN OF CARE
Pt had EGD/colonoscopy yesterday  Pt with healing duodenal ulcers and polyps which were not removed  Hgb stable in mid 7's  K up to 4 2 after replacement yesterday  Pt for pt to be d/c'ed home with [facility] when medically cleared  Attempted to wean O2 to RA while sleeping  Pt desated to low 80's when on RA  Pt's O2 sats WNL on 1L NC  Pt restarted on coumadin for afib  Will continue to monitor and intervene to promote positive outcomes      Problem: Potential for Falls  Goal: Patient will remain free of falls  Description: INTERVENTIONS:  - Educate patient/family on patient safety including physical limitations  - Instruct patient to call for assistance with activity   - Consult OT/PT to assist with strengthening/mobility   - Keep Call bell within reach  - Keep bed low and locked with side rails adjusted as appropriate  - Keep care items and personal belongings within reach  - Initiate and maintain comfort rounds  - Make Fall Risk Sign visible to staff  - Apply yellow socks and bracelet for high fall risk patients  - Consider moving patient to room near nurses station  Outcome: Progressing     Problem: CARDIOVASCULAR - ADULT  Goal: Maintains optimal cardiac output and hemodynamic stability  Description: INTERVENTIONS:  - Monitor I/O, vital signs and rhythm  - Monitor for S/S and trends of decreased cardiac output  - Administer and titrate ordered vasoactive medications to optimize hemodynamic stability  - Assess quality of pulses, skin color and temperature  - Assess for signs of decreased coronary artery perfusion  - Instruct patient to report change in severity of symptoms  Outcome: Progressing     Problem: GASTROINTESTINAL - ADULT  Goal: Maintains or returns to baseline bowel function  Description: INTERVENTIONS:  - Assess bowel function  - Encourage oral fluids to ensure adequate hydration  - Administer IV fluids if ordered to ensure adequate hydration  - Administer ordered medications as needed  - Encourage mobilization and activity  - Consider nutritional services referral to assist patient with adequate nutrition and appropriate food choices  Outcome: Progressing  Goal: Maintains adequate nutritional intake  Description: INTERVENTIONS:  - Monitor percentage of each meal consumed  - Identify factors contributing to decreased intake, treat as appropriate  - Assist with meals as needed  - Monitor I&O, weight, and lab values if indicated  - Obtain nutrition services referral as needed  Outcome: Progressing     Problem: MOBILITY - ADULT  Goal: Maintain or return to baseline ADL function  Description: INTERVENTIONS:  -  Assess patient's ability to carry out ADLs; assess patient's baseline for ADL function and identify physical deficits which impact ability to perform ADLs (bathing, care of mouth/teeth, toileting, grooming, dressing, etc )  - Assess/evaluate cause of self-care deficits   - Assess range of motion  - Assess patient's mobility; develop plan if impaired  - Assess patient's need for assistive devices and provide as appropriate  - Encourage maximum independence but intervene and supervise when necessary  - Involve family in performance of ADLs  - Assess for home care needs following discharge   - Consider OT consult to assist with ADL evaluation and planning for discharge  - Provide patient education as appropriate  Outcome: Progressing  Goal: Maintains/Returns to pre admission functional level  Description: INTERVENTIONS:  - Perform BMAT or MOVE assessment daily    - Set and communicate daily mobility goal to care team and patient/family/caregiver     - Collaborate with rehabilitation services on mobility goals if consulted  - Out of bed for toileting  - Record patient progress and toleration of activity level   Outcome: Progressing     Problem: Prexisting or High Potential for Compromised Skin Integrity  Goal: Skin integrity is maintained or improved  Description: INTERVENTIONS:  - Identify patients at risk for skin breakdown  - Assess and monitor skin integrity  - Assess and monitor nutrition and hydration status  - Monitor labs   - Assess for incontinence   - Turn and reposition patient  - Assist with mobility/ambulation  - Relieve pressure over bony prominences  - Avoid friction and shearing  - Provide appropriate hygiene as needed including keeping skin clean and dry  - Evaluate need for skin moisturizer/barrier cream  - Collaborate with interdisciplinary team   - Patient/family teaching  - Consider wound care consult   Outcome: Progressing     Problem: PAIN - ADULT  Goal: Verbalizes/displays adequate comfort level or baseline comfort level  Description: Interventions:  - Encourage patient to monitor pain and request assistance  - Assess pain using appropriate pain scale  - Administer analgesics based on type and severity of pain and evaluate response  - Implement non-pharmacological measures as appropriate and evaluate response  - Consider cultural and social influences on pain and pain management  - Notify physician/advanced practitioner if interventions unsuccessful or patient reports new pain  Outcome: Progressing     Problem: INFECTION - ADULT  Goal: Absence or prevention of progression during hospitalization  Description: INTERVENTIONS:  - Assess and monitor for signs and symptoms of infection  - Monitor lab/diagnostic results  - Monitor all insertion sites, i e  indwelling lines, tubes, and drains  - Monitor endotracheal if appropriate and nasal secretions for changes in amount and color  - [place] appropriate cooling/warming therapies per order  - Administer medications as ordered  - Instruct and encourage patient and family to use good hand hygiene technique  - Identify and instruct in appropriate isolation precautions for identified infection/condition  Outcome: Progressing     Problem: SAFETY ADULT  Goal: Patient will remain free of falls  Description: INTERVENTIONS:  - Educate patient/family on patient safety including physical limitations  - Instruct patient to call for assistance with activity   - Consult OT/PT to assist with strengthening/mobility   - Keep Call bell within reach  - Keep bed low and locked with side rails adjusted as appropriate  - Keep care items and personal belongings within reach  - Initiate and maintain comfort rounds  - Make Fall Risk Sign visible to staff  - Apply yellow socks and bracelet for high fall risk patients  - Consider moving patient to room near nurses station  Outcome: Progressing  Goal: Maintain or return to baseline ADL function  Description: INTERVENTIONS:  -  Assess patient's ability to carry out ADLs; assess patient's baseline for ADL function and identify physical deficits which impact ability to perform ADLs (bathing, care of mouth/teeth, toileting, grooming, dressing, etc )  - Assess/evaluate cause of self-care deficits   - Assess range of motion  - Assess patient's mobility; develop plan if impaired  - Assess patient's need for assistive devices and provide as appropriate  - Encourage maximum independence but intervene and supervise when necessary  - Involve family in performance of ADLs  - Assess for home care needs following discharge   - Consider OT consult to assist with ADL evaluation and planning for discharge  - Provide patient education as appropriate  Outcome: Progressing  Goal: Maintains/Returns to pre admission functional level  Description: INTERVENTIONS:  - Perform BMAT or MOVE assessment daily    - Set and communicate daily mobility goal to care team and patient/family/caregiver     - Collaborate with rehabilitation services on mobility goals if consulted  - Out of bed for toileting  - Record patient progress and toleration of activity level   Outcome: Progressing     Problem: DISCHARGE PLANNING  Goal: Discharge to home or other facility with appropriate resources  Description: INTERVENTIONS:  - Identify barriers to discharge w/patient and caregiver  - Arrange for needed discharge resources and transportation as appropriate  - Identify discharge learning needs (meds, wound care, etc )  - Arrange for interpretive services to assist at discharge as needed  - Refer to Case Management Department for coordinating discharge planning if the patient needs post-hospital services based on physician/advanced practitioner order or complex needs related to functional status, cognitive ability, or social support system  Outcome: Progressing     Problem: Knowledge Deficit  Goal: Patient/family/caregiver demonstrates understanding of disease process, treatment plan, medications, and discharge instructions  Description: Complete learning assessment and assess knowledge base    Interventions:  - Provide teaching at level of understanding  - Provide teaching via preferred learning methods  Outcome: Progressing

## 2021-08-11 NOTE — PROGRESS NOTES
[address]  Progress Note Alvester Parcel 1940, 78 y o  female MRN: [id number]  Unit/Bed#: -01 Encounter: [id number]  Primary Care Provider: Deion Marks DO   Date and time admitted to hospital: 8/5/2021  4:02 AM    JONNY (obstructive sleep apnea)  Assessment & Plan  · Suspected JONNY she desaturates when she sleeps at night - will need an outpatient sleep study for now will do overnight pulse oximetry most likely will need to sleep with 2 L of oxygen lungs are clear chest x-ray negative    Hypotension (arterial)  Assessment & Plan  · Secondary to blood loss anemia  Was dizzy this morning  · If persistent symptoms will transfuse again  Monitor with metoprolol use for rate control of atrial fibrillation  Blood pressure is normal today at 130    Hyperlipidemia  Assessment & Plan  · Simvastatin substituted with pravastatin based on hospital formulary    Atrial fibrillation [facility])  Assessment & Plan  · Continue metoprolol  INR supratherapeutic on admission requiring vitamin K and Kcentra INR is subtherapeutic at 1 8  · She restarted Coumadin on August 10th she got 5 mg she is very sensitive to Coumadin as her INR ready is up to 1 9 will give her lower dose today at 2 5 mg p o  X1 to avoid supratherapeutic INR as she had previously on admission when she was on 7 5 mg of Coumadin    Results from last 7 days   Lab Units 08/11/21  0439 08/10/21  0607 08/09/21  0604 08/08/21  0447 08/07/21  0547 08/06/21  0559 08/05/21  0948 08/05/21  0407   INR  1 90* 1 84* 1 76* 1 94* 1 82* 1 29* 1 67* 5 49*       SIRS (systemic inflammatory response syndrome) (HCC)  Assessment & Plan  · Secondary to GI bleeding and hypotension  No infection  Resolved    * Hemorrhagic shock due to gastrointestinal bleeding  Assessment & Plan  · Melanotic stool on admission with acute blood loss anemia requiring 2 units PRBC  · Status post EGD with nonbleeding duodenal ulcers    · She is status post EGD and colonoscopy done on  EGD showed no acute bleeding and ulcer is healing colonoscopy no acute bleeding but there is a large polyp that will need to be evaluated as outpatient in 1 month follow-up GI to discuss elective colonoscopy for polypectomy and biopsy  All was discussed with the patient's hemoglobin on  9 improved to 9 she has no more active bleed today trend down to 7 9 she is just started ferrous sulfate will add vitamin C for absorption  Will repeat a CBC to more to ensure stability  · Transfuse if less than 7  Results from last 7 days   Lab Units 21  0439 08/10/21  1820 08/10/21  0607 21  0604 21  0447 21  0547 21  1954 21  1422 21  0559 21  1758 21  0948 21  0407   HEMOGLOBIN g/dL 7 9* 9 0* 7 7* 7 8* 7 6* 7 7* 8 0* 8 8* 8 0* 8 4* 7 8* 7 5*             VTE Pharmacologic Prophylaxis: VTE Score: 7 High Risk (Score >/= 5) - Pharmacological DVT Prophylaxis Ordered: warfarin (Coumadin)  Sequential Compression Devices Ordered  Patient Centered Rounds: I performed bedside rounds with nursing staff today  Discussions with Specialists or Other Care Team Provider:  Case management    Education and Discussions with Family / Patient:  Patient    Time Spent for Care: 30 minutes  More than 50% of total time spent on counseling and coordination of care as described above  Current Length of Stay: 6 day(s)  Current Patient Status: Inpatient   Certification Statement: The patient will continue to require additional inpatient hospital stay due to Anemia  Discharge Plan: Anticipate discharge in 48 hrs to home  Code Status: Level 1 - Full Code    Subjective:   Patient seen and examined tolerating meals no abdominal pain no chest pain or shortness of breath  No dizziness    Has been walking around    Objective:     Vitals:   Temp (24hrs), Av °F (36 7 °C), Min:97 7 °F (36 5 °C), Max:98 7 °F (37 1 °C)    Temp:  [97 7 °F (36 5 °C)-98 7 °F (37 1 °C)] 98 7 °F (37 1 °C)  HR:  [] 93  Resp:  [16-21] 21  BP: (105-165)/(62-87) 147/79  SpO2:  [83 %-99 %] 98 %  Body mass index is 35 36 kg/m²  Input and Output Summary (last 24 hours): Intake/Output Summary (Last 24 hours) at 8/11/2021 1145  Last data filed at 8/11/2021 0500  Gross per 24 hour   Intake 1040 ml   Output 325 ml   Net 715 ml       Physical Exam:   Physical Exam  Vitals and nursing note reviewed  Constitutional:       General: She is not in acute distress  Appearance: She is well-developed  HENT:      Head: Normocephalic and atraumatic  Eyes:      Conjunctiva/sclera: Conjunctivae normal    Cardiovascular:      Rate and Rhythm: Normal rate  Rhythm irregular  Heart sounds: No murmur heard  Pulmonary:      Effort: Pulmonary effort is normal  No respiratory distress  Breath sounds: Normal breath sounds  No wheezing or rales  Abdominal:      General: There is no distension  Palpations: Abdomen is soft  Tenderness: There is no abdominal tenderness  Musculoskeletal:         General: No swelling  Cervical back: Neck supple  Skin:     General: Skin is warm and dry  Neurological:      General: No focal deficit present  Mental Status: She is alert and oriented to person, place, and time           Additional Data:     Labs:  Results from last 7 days   Lab Units 08/11/21  0439   WBC Thousand/uL 5 45   HEMOGLOBIN g/dL 7 9*   HEMATOCRIT % 25 6*   PLATELETS Thousands/uL 226   NEUTROS PCT % 54   LYMPHS PCT % 33   MONOS PCT % 10   EOS PCT % 2     Results from last 7 days   Lab Units 08/11/21  0439 08/09/21  0604   SODIUM mmol/L 144 141   POTASSIUM mmol/L 4 2 4 2   CHLORIDE mmol/L 107 104   CO2 mmol/L 28 29   BUN mg/dL 11 8   CREATININE mg/dL 0 93 0 86   ANION GAP mmol/L 9 8   CALCIUM mg/dL 7 6* 7 7*   ALBUMIN g/dL  --  2 3*   TOTAL BILIRUBIN mg/dL  --  1 16*   ALK PHOS U/L  --  77   ALT U/L  --  17   AST U/L  --  25   GLUCOSE RANDOM mg/dL 84 96 Results from last 7 days   Lab Units 08/11/21  0439   INR  1 90*             Results from last 7 days   Lab Units 08/05/21  0641 08/05/21  0407   LACTIC ACID mmol/L 1 5 4 1*       Lines/Drains:  Invasive Devices     Peripheral Intravenous Line            Peripheral IV 08/09/21 Right;Lateral Wrist 2 days          Drain            External Urinary Catheter <1 day                      Imaging: Reviewed radiology reports from this admission including: chest xray    Recent Cultures (last 7 days):   Results from last 7 days   Lab Units 08/05/21  0440   BLOOD CULTURE  No Growth After 5 Days  No Growth After 5 Days  Last 24 Hours Medication List:   Current Facility-Administered Medications   Medication Dose Route Frequency Provider Last Rate    acetaminophen  650 mg Oral Q6H PRN Lucy Roberts PA-C      ascorbic acid  500 mg Oral Daily Lisa Barfield MD      ferrous sulfate  325 mg Oral Daily With Breakfast Lisa Barfield MD      metoprolol succinate  50 mg Oral Daily Lucy Roberts PA-C      ondansetron  4 mg Intravenous Q4H PRN Veronica Rajani, DO      pantoprazole  40 mg Oral BID AC Lucy Roberts PA-C      pravastatin  20 mg Oral Daily With Yris Clifford PA-C      warfarin  2 5 mg Oral Once (warfarin) Lisa Barfield MD          Today, Patient Was Seen By: Lisa Barfield MD    **Please Note: This note may have been constructed using a voice recognition system  **

## 2021-08-11 NOTE — ASSESSMENT & PLAN NOTE
· Continue metoprolol  INR supratherapeutic on admission requiring vitamin K and Kcentra INR is subtherapeutic at 1 8  · She restarted Coumadin on August 10th she got 5 mg she is very sensitive to Coumadin as her INR ready is up to 1 9 will give her lower dose today at 2 5 mg p o   X1 to avoid supratherapeutic INR as she had previously on admission when she was on 7 5 mg of Coumadin    Results from last 7 days   Lab Units 08/11/21  0439 08/10/21  0607 08/09/21  0604 08/08/21  0447 08/07/21  0547 08/06/21  0559 08/05/21  0948 08/05/21  0407   INR  1 90* 1 84* 1 76* 1 94* 1 82* 1 29* 1 67* 5 49*

## 2021-08-11 NOTE — ASSESSMENT & PLAN NOTE
· Suspected JONNY she desaturates when she sleeps at night - will need an outpatient sleep study for now will do overnight pulse oximetry most likely will need to sleep with 2 L of oxygen lungs are clear chest x-ray negative

## 2021-08-12 PROBLEM — I95.9 HYPOTENSION (ARTERIAL): Status: RESOLVED | Noted: 2021-08-09 | Resolved: 2021-08-12

## 2021-08-12 LAB
BASOPHILS # BLD AUTO: 0.02 THOUSANDS/ΜL (ref 0–0.1)
BASOPHILS NFR BLD AUTO: 0 % (ref 0–1)
EOSINOPHIL # BLD AUTO: 0.13 THOUSAND/ΜL (ref 0–0.61)
EOSINOPHIL NFR BLD AUTO: 2 % (ref 0–6)
ERYTHROCYTE [DISTWIDTH] IN BLOOD BY AUTOMATED COUNT: 16.9 % (ref 11.6–15.1)
HCT VFR BLD AUTO: 26.3 % (ref 34.8–46.1)
HGB BLD-MCNC: 8.3 G/DL (ref 11.5–15.4)
IMM GRANULOCYTES # BLD AUTO: 0.05 THOUSAND/UL (ref 0–0.2)
IMM GRANULOCYTES NFR BLD AUTO: 1 % (ref 0–2)
INR PPP: 1.91 (ref 0.84–1.19)
LYMPHOCYTES # BLD AUTO: 1.99 THOUSANDS/ΜL (ref 0.6–4.47)
LYMPHOCYTES NFR BLD AUTO: 37 % (ref 14–44)
MCH RBC QN AUTO: 29.3 PG (ref 26.8–34.3)
MCHC RBC AUTO-ENTMCNC: 31.6 G/DL (ref 31.4–37.4)
MCV RBC AUTO: 93 FL (ref 82–98)
MONOCYTES # BLD AUTO: 0.58 THOUSAND/ΜL (ref 0.17–1.22)
MONOCYTES NFR BLD AUTO: 11 % (ref 4–12)
NEUTROPHILS # BLD AUTO: 2.56 THOUSANDS/ΜL (ref 1.85–7.62)
NEUTS SEG NFR BLD AUTO: 49 % (ref 43–75)
NRBC BLD AUTO-RTO: 0 /100 WBCS
PLATELET # BLD AUTO: 232 THOUSANDS/UL (ref 149–390)
PMV BLD AUTO: 10.3 FL (ref 8.9–12.7)
PROTHROMBIN TIME: 21.4 SECONDS (ref 11.6–14.5)
RBC # BLD AUTO: 2.83 MILLION/UL (ref 3.81–5.12)
WBC # BLD AUTO: 5.33 THOUSAND/UL (ref 4.31–10.16)

## 2021-08-12 PROCEDURE — 85025 COMPLETE CBC W/AUTO DIFF WBC: CPT | Performed by: NURSE PRACTITIONER

## 2021-08-12 PROCEDURE — 94762 N-INVAS EAR/PLS OXIMTRY CONT: CPT

## 2021-08-12 PROCEDURE — 97530 THERAPEUTIC ACTIVITIES: CPT

## 2021-08-12 PROCEDURE — 97116 GAIT TRAINING THERAPY: CPT

## 2021-08-12 PROCEDURE — 99232 SBSQ HOSP IP/OBS MODERATE 35: CPT | Performed by: FAMILY MEDICINE

## 2021-08-12 PROCEDURE — 85610 PROTHROMBIN TIME: CPT | Performed by: NURSE PRACTITIONER

## 2021-08-12 RX ORDER — WARFARIN SODIUM 3 MG/1
3 TABLET ORAL
Status: COMPLETED | OUTPATIENT
Start: 2021-08-12 | End: 2021-08-12

## 2021-08-12 RX ADMIN — METOPROLOL SUCCINATE 50 MG: 50 TABLET, EXTENDED RELEASE ORAL at 08:08

## 2021-08-12 RX ADMIN — PRAVASTATIN SODIUM 20 MG: 20 TABLET ORAL at 15:49

## 2021-08-12 RX ADMIN — OXYCODONE HYDROCHLORIDE AND ACETAMINOPHEN 500 MG: 500 TABLET ORAL at 08:08

## 2021-08-12 RX ADMIN — WARFARIN SODIUM 3 MG: 3 TABLET ORAL at 17:31

## 2021-08-12 RX ADMIN — PANTOPRAZOLE SODIUM 40 MG: 40 TABLET, DELAYED RELEASE ORAL at 06:30

## 2021-08-12 RX ADMIN — FERROUS SULFATE TAB 325 MG (65 MG ELEMENTAL FE) 325 MG: 325 (65 FE) TAB at 06:30

## 2021-08-12 RX ADMIN — PANTOPRAZOLE SODIUM 40 MG: 40 TABLET, DELAYED RELEASE ORAL at 15:49

## 2021-08-12 NOTE — CASE MANAGEMENT
Case Management Progress Note    Patient name Erick Be  Location /-67 MRN [id number]  : 1940 Date 2021       LOS (days): 7  Geometric Mean LOS (GMLOS) (days): 4 40  Days to GMLOS:-2 9          PROGRESS NOTE:  O2 1L HS has been recommended for pt at time of discharge  Cm discussing DME options with pt, pt does not have a preference to any DME company  An order has been placed in [address] [place] [address]  Awaiting reply    [place] of choice was provided in regards to needing a home medical equipment company, pt does not have preference  Pt is aware that we frequently utilized [employer] as we have a working relationship with this company  Patients choice is to use [employer]

## 2021-08-12 NOTE — PHYSICAL THERAPY NOTE
PHYSICAL THERAPY TREATMENT NOTE  NAME:  Eusebio Vernon  DATE: 08/12/21    Length Of Stay: 7  Performed at least 2 patient identifiers during session: Name and Birthday    TREATMENT FLOW SHEET:      08/12/21 1412   PT Last Visit   PT Visit Date 08/12/21   Note Type   Note Type Treatment   Pain Assessment   Pain Assessment Tool 0-10   Pain Score No Pain   Restrictions/Precautions   Other Precautions Chair Alarm; Bed Alarm; Fall Risk   General   Chart Reviewed Yes   Response to Previous Treatment Patient with no complaints from previous session  Cognition   Orientation Level Oriented X4   Following Commands Follows one step commands without difficulty   Subjective   Subjective "I should go to the bathroom first "   Bed Mobility   Additional Comments Pt sitting OOB in recliner chair upon arrival and at end of session with all needs within reach and posey alarm on   Transfers   Sit to Stand 5  Supervision   Additional items Verbal cues; Increased time required   Stand to Sit 5  Supervision   Additional items Increased time required;Verbal cues   Stand pivot 5  Supervision   Additional items Increased time required;Verbal cues   Toilet transfer   (stand by assistance)   Additional items Assist x 1; Increased time required;Verbal cues;Standard toilet   Additional Comments use of RW  min verbal cues for hand placement for safety with use of RW  Ambulation/Elevation   Gait pattern Wide VERNELL; Excessively slow; Short stride; Forward Flexion   Gait Assistance   (close supervision)   Additional items Assist x 1;Verbal cues   Assistive Device Rolling walker   Distance 24'x1, 14'x1, 60'x1 and 22'x1 with RW with slow juliet, decreased step length, forward flexion  min cues for posture and inc step length  during 61' trial, patient initiated standing rest break no > 10 seconds at 39' and 54'   Stair Management Assistance   (stand by assistance)   Additional items Verbal cues; Increased time required   Stair Management Technique Two rails; Step to pattern; Foreward   Number of Stairs 5   Balance   Static Sitting Normal   Dynamic Sitting Good   Static Standing Fair +   Dynamic Standing Fair   Ambulatory Fair   Endurance Deficit   Endurance Deficit Description pt on room air throughout  min LIPSCOMB which resolved within 2' sitting rest     Activity Tolerance   Activity Tolerance Patient limited by fatigue   Nurse Made Aware [place] MORE   Assessment   Prognosis Good   Problem List Decreased strength;Decreased endurance; Impaired balance;Decreased mobility; Decreased safety awareness; Obesity   Barriers to Discharge   (2nd floor bedroom and bathroom)   Barriers to Discharge Comments pt reports her son will assist her with stairs and she can stay on 1st floor if needed upon return to home  Goals   Patient Goals "Walk around the room for a bit and loosen up "   PT Treatment Day 3   Plan   Treatment/Interventions Functional transfer training;LE strengthening/ROM; Elevations; Therapeutic exercise; Endurance training;Patient/family training;Equipment eval/education; Bed mobility;Gait training; Compensatory technique education;Spoke to nursing;Spoke to case management   Progress Slow progress, decreased activity tolerance   PT Frequency Other (Comment)  (3-5x/week)   Recommendation   PT Discharge Recommendation Home with home health rehabilitation   Equipment Recommended   (walker-pt has)   PT - OK to Discharge   (when medically cleared)   AM-PAC Basic Mobility Inpatient   Turning in Bed Without Bedrails 4   Lying on Back to Sitting on Edge of Flat Bed 4   Moving Bed to Chair 3   Standing Up From Chair 3   Walk in Room 3   Climb 3-5 Stairs 3   Basic Mobility Inpatient Raw Score 20   Basic Mobility Standardized Score 43 99       The patient's AM-PAC Basic Mobility Inpatient Short Form Raw Score is 20, Standardized Score is 43 99  A standardized score greater than 42 9 suggests the patient may benefit from discharge to home   Please also refer to the recommendation of the Physical Therapist for safe discharge planning  Pt tolerated session well with increased sitting rest between activities  She was able to ambulate increased distance and completed transfers, ambulation and stairs with decreased assistance this date  She is limited by decreased endurance with quick onset fatigue, decreased strength, balance  She requires min cues for hand placement for safety with RW  She will continue to benefit from PT services to maximize LOF as she is making positive gains toward STGs        Terrial Eduardo, PT,DPT

## 2021-08-12 NOTE — NURSING NOTE
Report called to Amara Dougherty on [facility]   RN given report on pt  All questions answered  Pt transferred in wheelchair with all belongings  Pt confirmed she had all belongings on transfer

## 2021-08-12 NOTE — ASSESSMENT & PLAN NOTE
· Melanotic stool on admission with acute blood loss anemia requiring 2 units PRBC  · Status post EGD with nonbleeding duodenal ulcers  · She is status post EGD and colonoscopy done on August 10th EGD showed no acute bleeding and ulcer is healing colonoscopy no acute bleeding but there is a large polyp that will need to be evaluated as outpatient in 1 month follow-up GI to discuss elective colonoscopy for polypectomy and biopsy  Hemoglobin improved to 8 3 on iron supplements for absorption  She has more melenic stools and no evidence of bloody stools as well    Transfuse if less than 7  Results from last 7 days   Lab Units 08/12/21  0518 08/11/21  0439 08/10/21  1820 08/10/21  0607 08/09/21  0604 08/08/21  0447 08/07/21  0547 08/06/21  1954 08/06/21  1422 08/06/21  0559 08/05/21  1758   HEMOGLOBIN g/dL 8 3* 7 9* 9 0* 7 7* 7 8* 7 6* 7 7* 8 0* 8 8* 8 0* 8 4*

## 2021-08-12 NOTE — PLAN OF CARE
Problem: PHYSICAL THERAPY ADULT  Goal: Performs mobility at highest level of function for planned discharge setting  See evaluation for individualized goals  Description: Treatment/Interventions: Functional transfer training, LE strengthening/ROM, Elevations, Therapeutic exercise, Endurance training, Patient/family training, Bed mobility, Equipment eval/education, Gait training, Compensatory technique education, Spoke to nursing, Spoke to case management, OT  Equipment Recommended:  (walker-pt reports having)       See flowsheet documentation for full assessment, interventions and recommendations  Note: Prognosis: Good  Problem List: Decreased strength, Decreased endurance, Impaired balance, Decreased mobility, Decreased safety awareness, Obesity  Assessment: Pt tolerated session well with increased sitting rest between activities  She was able to ambulate increased distance and completed transfers, ambulation and stairs with decreased assistance this date  She is limited by decreased endurance with quick onset fatigue, decreased strength, balance  She requires min cues for hand placement for safety with RW  She will continue to benefit from PT services to maximize LOF as she is making positive gains toward STGs  Barriers to Discharge:  (2nd floor bedroom and bathroom)  Barriers to Discharge Comments: pt reports her son will assist her with stairs and she can stay on 1st floor if needed upon return to home  PT Discharge Recommendation: Home with home health rehabilitation     PT - OK to Discharge:  (when medically cleared)    See flowsheet documentation for full assessment

## 2021-08-12 NOTE — ASSESSMENT & PLAN NOTE
· Continue metoprolol  INR supratherapeutic on admission requiring vitamin K and Kcentra INR is subtherapeutic at 1 8  · She restarted Coumadin on August 10th she got 5 mg she is very sensitive to Coumadin as her INR ready is up to 1 9 will give her lower dose today at 3 mg p o   X1 to avoid supratherapeutic INR as she had previously on admission when she was on 7 5 mg of Coumadin    Results from last 7 days   Lab Units 08/12/21  0518 08/11/21  0439 08/10/21  0607 08/09/21  0604 08/08/21  0447 08/07/21  0547 08/06/21  0559   INR  1 91* 1 90* 1 84* 1 76* 1 94* 1 82* 1 29*

## 2021-08-12 NOTE — PLAN OF CARE
Problem: Potential for Falls  Goal: Patient will remain free of falls  Description: INTERVENTIONS:  - Educate patient/family on patient safety including physical limitations  - Instruct patient to call for assistance with activity   - Consult OT/PT to assist with strengthening/mobility   - Keep Call bell within reach  - Keep bed low and locked with side rails adjusted as appropriate  - Keep care items and personal belongings within reach  - Initiate and maintain comfort rounds  - Make Fall Risk Sign visible to staff  - Apply yellow socks and bracelet for high fall risk patients  - Consider moving patient to room near nurses station  Outcome: Progressing     Problem: CARDIOVASCULAR - ADULT  Goal: Maintains optimal cardiac output and hemodynamic stability  Description: INTERVENTIONS:  - Monitor I/O, vital signs and rhythm  - Monitor for S/S and trends of decreased cardiac output  - Administer and titrate ordered vasoactive medications to optimize hemodynamic stability  - Assess quality of pulses, skin color and temperature  - Assess for signs of decreased coronary artery perfusion  - Instruct patient to report change in severity of symptoms  Outcome: Progressing     Problem: GASTROINTESTINAL - ADULT  Goal: Maintains or returns to baseline bowel function  Description: INTERVENTIONS:  - Assess bowel function  - Encourage oral fluids to ensure adequate hydration  - Administer IV fluids if ordered to ensure adequate hydration  - Administer ordered medications as needed  - Encourage mobilization and activity  - Consider nutritional services referral to assist patient with adequate nutrition and appropriate food choices  Outcome: Progressing  Goal: Maintains adequate nutritional intake  Description: INTERVENTIONS:  - Monitor percentage of each meal consumed  - Identify factors contributing to decreased intake, treat as appropriate  - Assist with meals as needed  - Monitor I&O, weight, and lab values if indicated  - Obtain nutrition services referral as needed  Outcome: Progressing     Problem: MOBILITY - ADULT  Goal: Maintain or return to baseline ADL function  Description: INTERVENTIONS:  -  Assess patient's ability to carry out ADLs; assess patient's baseline for ADL function and identify physical deficits which impact ability to perform ADLs (bathing, care of mouth/teeth, toileting, grooming, dressing, etc )  - Assess/evaluate cause of self-care deficits   - Assess range of motion  - Assess patient's mobility; develop plan if impaired  - Assess patient's need for assistive devices and provide as appropriate  - Encourage maximum independence but intervene and supervise when necessary  - Involve family in performance of ADLs  - Assess for home care needs following discharge   - Consider OT consult to assist with ADL evaluation and planning for discharge  - Provide patient education as appropriate  Outcome: Progressing  Goal: Maintains/Returns to pre admission functional level  Description: INTERVENTIONS:  - Perform BMAT or MOVE assessment daily    - Set and communicate daily mobility goal to care team and patient/family/caregiver     - Collaborate with rehabilitation services on mobility goals if consulted  - Out of bed for toileting  - Record patient progress and toleration of activity level   Outcome: Progressing     Problem: Prexisting or High Potential for Compromised Skin Integrity  Goal: Skin integrity is maintained or improved  Description: INTERVENTIONS:  - Identify patients at risk for skin breakdown  - Assess and monitor skin integrity  - Assess and monitor nutrition and hydration status  - Monitor labs   - Assess for incontinence   - Turn and reposition patient  - Assist with mobility/ambulation  - Relieve pressure over bony prominences  - Avoid friction and shearing  - Provide appropriate hygiene as needed including keeping skin clean and dry  - Evaluate need for skin moisturizer/barrier cream  - Collaborate with interdisciplinary team   - Patient/family teaching  - Consider wound care consult   Outcome: Progressing     Problem: PAIN - ADULT  Goal: Verbalizes/displays adequate comfort level or baseline comfort level  Description: Interventions:  - Encourage patient to monitor pain and request assistance  - Assess pain using appropriate pain scale  - Administer analgesics based on type and severity of pain and evaluate response  - Implement non-pharmacological measures as appropriate and evaluate response  - Consider cultural and social influences on pain and pain management  - Notify physician/advanced practitioner if interventions unsuccessful or patient reports new pain  Outcome: Progressing     Problem: INFECTION - ADULT  Goal: Absence or prevention of progression during hospitalization  Description: INTERVENTIONS:  - Assess and monitor for signs and symptoms of infection  - Monitor lab/diagnostic results  - Monitor all insertion sites, i e  indwelling lines, tubes, and drains  - Monitor endotracheal if appropriate and nasal secretions for changes in amount and color  - [place] appropriate cooling/warming therapies per order  - Administer medications as ordered  - Instruct and encourage patient and family to use good hand hygiene technique  - Identify and instruct in appropriate isolation precautions for identified infection/condition  Outcome: Progressing     Problem: SAFETY ADULT  Goal: Patient will remain free of falls  Description: INTERVENTIONS:  - Educate patient/family on patient safety including physical limitations  - Instruct patient to call for assistance with activity   - Consult OT/PT to assist with strengthening/mobility   - Keep Call bell within reach  - Keep bed low and locked with side rails adjusted as appropriate  - Keep care items and personal belongings within reach  - Initiate and maintain comfort rounds  - Make Fall Risk Sign visible to staff  - Apply yellow socks and bracelet for high fall risk patients  - Consider moving patient to room near nurses station  Outcome: Progressing  Goal: Maintain or return to baseline ADL function  Description: INTERVENTIONS:  -  Assess patient's ability to carry out ADLs; assess patient's baseline for ADL function and identify physical deficits which impact ability to perform ADLs (bathing, care of mouth/teeth, toileting, grooming, dressing, etc )  - Assess/evaluate cause of self-care deficits   - Assess range of motion  - Assess patient's mobility; develop plan if impaired  - Assess patient's need for assistive devices and provide as appropriate  - Encourage maximum independence but intervene and supervise when necessary  - Involve family in performance of ADLs  - Assess for home care needs following discharge   - Consider OT consult to assist with ADL evaluation and planning for discharge  - Provide patient education as appropriate  Outcome: Progressing  Goal: Maintains/Returns to pre admission functional level  Description: INTERVENTIONS:  - Perform BMAT or MOVE assessment daily    - Set and communicate daily mobility goal to care team and patient/family/caregiver     - Collaborate with rehabilitation services on mobility goals if consulted  - Out of bed for toileting  - Record patient progress and toleration of activity level   Outcome: Progressing     Problem: DISCHARGE PLANNING  Goal: Discharge to home or other facility with appropriate resources  Description: INTERVENTIONS:  - Identify barriers to discharge w/patient and caregiver  - Arrange for needed discharge resources and transportation as appropriate  - Identify discharge learning needs (meds, wound care, etc )  - Arrange for interpretive services to assist at discharge as needed  - Refer to Case Management Department for coordinating discharge planning if the patient needs post-hospital services based on physician/advanced practitioner order or complex needs related to functional status, cognitive ability, or social support system  Outcome: Progressing     Problem: Knowledge Deficit  Goal: Patient/family/caregiver demonstrates understanding of disease process, treatment plan, medications, and discharge instructions  Description: Complete learning assessment and assess knowledge base    Interventions:  - Provide teaching at level of understanding  - Provide teaching via preferred learning methods  Outcome: Progressing

## 2021-08-12 NOTE — ASSESSMENT & PLAN NOTE
· Suspected JONNY she desaturates when she sleeps at night - will need an outpatient sleep study for now will do overnight pulse oximetry for pulse oximetry done overnight patient had 34 events less than 88% she requires 1 L of oxygen when she sleeps as discussed with the patient also will need outpatient sleep study lungs sound clear

## 2021-08-12 NOTE — PROGRESS NOTES
[address]  Progress Note - Jj Colon 1940, 78 y o  female MRN: [id number]  Unit/Bed#: -[facility] Encounter: [id number]  Primary Care Provider: Fernando Ho DO   Date and time admitted to hospital: 8/5/2021  4:02 AM    JONNY (obstructive sleep apnea)  Assessment & Plan  · Suspected JONNY she desaturates when she sleeps at night - will need an outpatient sleep study for now will do overnight pulse oximetry for pulse oximetry done overnight patient had 34 events less than 88% she requires 1 L of oxygen when she sleeps as discussed with the patient also will need outpatient sleep study lungs sound clear    Hyperlipidemia  Assessment & Plan  · Simvastatin substituted with pravastatin based on hospital formulary    Atrial fibrillation ([address] )  Assessment & Plan  · Continue metoprolol  INR supratherapeutic on admission requiring vitamin K and Kcentra INR is subtherapeutic at 1 8  · She restarted Coumadin on August 10th she got 5 mg she is very sensitive to Coumadin as her INR ready is up to 1 9 will give her lower dose today at 3 mg p o  X1 to avoid supratherapeutic INR as she had previously on admission when she was on 7 5 mg of Coumadin    Results from last 7 days   Lab Units 08/12/21  0518 08/11/21  0439 08/10/21  0607 08/09/21  0604 08/08/21  0447 08/07/21  0547 08/06/21  0559   INR  1 91* 1 90* 1 84* 1 76* 1 94* 1 82* 1 29*       SIRS (systemic inflammatory response syndrome) (HCC)  Assessment & Plan  · Secondary to GI bleeding and hypotension  No infection  Resolved    * Hemorrhagic shock due to gastrointestinal bleeding  Assessment & Plan  · Melanotic stool on admission with acute blood loss anemia requiring 2 units PRBC  · Status post EGD with nonbleeding duodenal ulcers    · She is status post EGD and colonoscopy done on August 10th EGD showed no acute bleeding and ulcer is healing colonoscopy no acute bleeding but there is a large polyp that will need to be evaluated as outpatient in 1 month follow-up GI to discuss elective colonoscopy for polypectomy and biopsy  Hemoglobin improved to 8 3 on iron supplements for absorption  She has more melenic stools and no evidence of bloody stools as well  Transfuse if less than 7  Results from last 7 days   Lab Units 21  0518 21  0439 08/10/21  1820 08/10/21  0607 21  0604 21  0447 21  0547 21  1954 21  1422 21  0559 21  1758   HEMOGLOBIN g/dL 8 3* 7 9* 9 0* 7 7* 7 8* 7 6* 7 7* 8 0* 8 8* 8 0* 8 4*       Hypotension (arterial)-resolved as of 2021  Assessment & Plan  · Secondary to blood loss anemia  Was dizzy this morning  · If persistent symptoms will transfuse again  Monitor with metoprolol use for rate control of atrial fibrillation  Blood pressure is normal today at 130          VTE Pharmacologic Prophylaxis: VTE Score: 7 High Risk (Score >/= 5) - Pharmacological DVT Prophylaxis Ordered: warfarin (Coumadin)  Sequential Compression Devices Ordered  Patient Centered Rounds: I performed bedside rounds with nursing staff today  Discussions with Specialists or Other Care Team Provider:  I have discussed with case management today    Education and Discussions with Family / Patient:  Patient she will update son    Time Spent for Care: 30 minutes  More than 50% of total time spent on counseling and coordination of care as described above  Current Length of Stay: 7 day(s)  Current Patient Status: Inpatient   Certification Statement: The patient will continue to require additional inpatient hospital stay due to Secondary to anemia  Discharge Plan: Anticipate discharge tomorrow to home  Code Status: Level 1 - Full Code    Subjective:   Patient seen and examined denies any chest pain or shortness of breath denies any abdominal pain or dizziness has normal bowel movements without any melena or blood    Eating    Objective:     Vitals:   Temp (24hrs), Av 7 °F (37 1 °C), Min:98 1 °F (36 7 °C), Max:99 2 °F (37 3 °C)    Temp:  [98 1 °F (36 7 °C)-99 2 °F (37 3 °C)] 98 6 °F (37 °C)  HR:  [84-97] 97  Resp:  [16-21] 18  BP: ()/(66-74) 116/72  SpO2:  [89 %-97 %] 92 %  Body mass index is 35 36 kg/m²  Input and Output Summary (last 24 hours): Intake/Output Summary (Last 24 hours) at 8/12/2021 1132  Last data filed at 8/12/2021 0900  Gross per 24 hour   Intake 1440 ml   Output 400 ml   Net 1040 ml       Physical Exam:   Physical Exam  Vitals and nursing note reviewed  Constitutional:       General: She is not in acute distress  Appearance: She is well-developed  HENT:      Head: Normocephalic and atraumatic  Eyes:      Conjunctiva/sclera: Conjunctivae normal    Cardiovascular:      Rate and Rhythm: Normal rate  Rhythm irregular  Heart sounds: No murmur heard  Pulmonary:      Effort: Pulmonary effort is normal  No respiratory distress  Breath sounds: Normal breath sounds  Abdominal:      General: There is no distension  Palpations: Abdomen is soft  Tenderness: There is no abdominal tenderness  Musculoskeletal:         General: No swelling  Cervical back: Neck supple  Skin:     General: Skin is warm and dry  Neurological:      Mental Status: She is alert            Additional Data:     Labs:  Results from last 7 days   Lab Units 08/12/21  0518   WBC Thousand/uL 5 33   HEMOGLOBIN g/dL 8 3*   HEMATOCRIT % 26 3*   PLATELETS Thousands/uL 232   NEUTROS PCT % 49   LYMPHS PCT % 37   MONOS PCT % 11   EOS PCT % 2     Results from last 7 days   Lab Units 08/11/21  0439 08/09/21  0604   SODIUM mmol/L 144 141   POTASSIUM mmol/L 4 2 4 2   CHLORIDE mmol/L 107 104   CO2 mmol/L 28 29   BUN mg/dL 11 8   CREATININE mg/dL 0 93 0 86   ANION GAP mmol/L 9 8   CALCIUM mg/dL 7 6* 7 7*   ALBUMIN g/dL  --  2 3*   TOTAL BILIRUBIN mg/dL  --  1 16*   ALK PHOS U/L  --  77   ALT U/L  --  17   AST U/L  --  25   GLUCOSE RANDOM mg/dL 84 96     Results from last 7 days Lab Units 08/12/21  0518   INR  1 91*                   Lines/Drains:  Invasive Devices     Peripheral Intravenous Line            Peripheral IV 08/09/21 Right;Lateral Wrist 3 days                      Imaging: Reviewed radiology reports from this admission including: abdominal/pelvic CT    Recent Cultures (last 7 days):         Last 24 Hours Medication List:   Current Facility-Administered Medications   Medication Dose Route Frequency Provider Last Rate    acetaminophen  650 mg Oral Q6H PRN Desiree S Dale, CRNP      ascorbic acid  500 mg Oral Daily Desiree S Dale, CRNP      ferrous sulfate  325 mg Oral Daily With Breakfast Desiree S Dale, CRNP      metoprolol succinate  50 mg Oral Daily Desiree S Dale, CRNP      ondansetron  4 mg Intravenous Q4H PRN Desiree S Dale, CRNP      pantoprazole  40 mg Oral BID AC Desiree S Dale, CRNP      pravastatin  20 mg Oral Daily With [employer] S Dale, CRNP      warfarin  3 mg Oral Once (warfarin) Anmol Vargas MD          Today, Patient Was Seen By: Anmol Vargas MD    **Please Note: This note may have been constructed using a voice recognition system  **

## 2021-08-12 NOTE — PLAN OF CARE
Problem: Potential for Falls  Goal: Patient will remain free of falls  Description: INTERVENTIONS:  - Educate patient/family on patient safety including physical limitations  - Instruct patient to call for assistance with activity   - Consult OT/PT to assist with strengthening/mobility   - Keep Call bell within reach  - Keep bed low and locked with side rails adjusted as appropriate  - Keep care items and personal belongings within reach  - Initiate and maintain comfort rounds  - Make Fall Risk Sign visible to staff  - Apply yellow socks and bracelet for high fall risk patients  - Consider moving patient to room near nurses station  Outcome: Progressing     Problem: CARDIOVASCULAR - ADULT  Goal: Maintains optimal cardiac output and hemodynamic stability  Description: INTERVENTIONS:  - Monitor I/O, vital signs and rhythm  - Monitor for S/S and trends of decreased cardiac output  - Administer and titrate ordered vasoactive medications to optimize hemodynamic stability  - Assess quality of pulses, skin color and temperature  - Assess for signs of decreased coronary artery perfusion  - Instruct patient to report change in severity of symptoms  Outcome: Progressing     Problem: GASTROINTESTINAL - ADULT  Goal: Maintains or returns to baseline bowel function  Description: INTERVENTIONS:  - Assess bowel function  - Encourage oral fluids to ensure adequate hydration  - Administer IV fluids if ordered to ensure adequate hydration  - Administer ordered medications as needed  - Encourage mobilization and activity  - Consider nutritional services referral to assist patient with adequate nutrition and appropriate food choices  Outcome: Progressing  Goal: Maintains adequate nutritional intake  Description: INTERVENTIONS:  - Monitor percentage of each meal consumed  - Identify factors contributing to decreased intake, treat as appropriate  - Assist with meals as needed  - Monitor I&O, weight, and lab values if indicated  - Obtain nutrition services referral as needed  Outcome: Progressing     Problem: MOBILITY - ADULT  Goal: Maintain or return to baseline ADL function  Description: INTERVENTIONS:  -  Assess patient's ability to carry out ADLs; assess patient's baseline for ADL function and identify physical deficits which impact ability to perform ADLs (bathing, care of mouth/teeth, toileting, grooming, dressing, etc )  - Assess/evaluate cause of self-care deficits   - Assess range of motion  - Assess patient's mobility; develop plan if impaired  - Assess patient's need for assistive devices and provide as appropriate  - Encourage maximum independence but intervene and supervise when necessary  - Involve family in performance of ADLs  - Assess for home care needs following discharge   - Consider OT consult to assist with ADL evaluation and planning for discharge  - Provide patient education as appropriate  Outcome: Progressing  Goal: Maintains/Returns to pre admission functional level  Description: INTERVENTIONS:  - Perform BMAT or MOVE assessment daily    - Set and communicate daily mobility goal to care team and patient/family/caregiver     - Collaborate with rehabilitation services on mobility goals if consulted  - Out of bed for toileting  - Record patient progress and toleration of activity level   Outcome: Progressing     Problem: Prexisting or High Potential for Compromised Skin Integrity  Goal: Skin integrity is maintained or improved  Description: INTERVENTIONS:  - Identify patients at risk for skin breakdown  - Assess and monitor skin integrity  - Assess and monitor nutrition and hydration status  - Monitor labs   - Assess for incontinence   - Turn and reposition patient  - Assist with mobility/ambulation  - Relieve pressure over bony prominences  - Avoid friction and shearing  - Provide appropriate hygiene as needed including keeping skin clean and dry  - Evaluate need for skin moisturizer/barrier cream  - Collaborate with interdisciplinary team   - Patient/family teaching  - Consider wound care consult   Outcome: Progressing     Problem: PAIN - ADULT  Goal: Verbalizes/displays adequate comfort level or baseline comfort level  Description: Interventions:  - Encourage patient to monitor pain and request assistance  - Assess pain using appropriate pain scale  - Administer analgesics based on type and severity of pain and evaluate response  - Implement non-pharmacological measures as appropriate and evaluate response  - Consider cultural and social influences on pain and pain management  - Notify physician/advanced practitioner if interventions unsuccessful or patient reports new pain  Outcome: Progressing     Problem: INFECTION - ADULT  Goal: Absence or prevention of progression during hospitalization  Description: INTERVENTIONS:  - Assess and monitor for signs and symptoms of infection  - Monitor lab/diagnostic results  - Monitor all insertion sites, i e  indwelling lines, tubes, and drains  - Monitor endotracheal if appropriate and nasal secretions for changes in amount and color  - Christiana appropriate cooling/warming therapies per order  - Administer medications as ordered  - Instruct and encourage patient and family to use good hand hygiene technique  - Identify and instruct in appropriate isolation precautions for identified infection/condition  Outcome: Progressing     Problem: SAFETY ADULT  Goal: Patient will remain free of falls  Description: INTERVENTIONS:  - Educate patient/family on patient safety including physical limitations  - Instruct patient to call for assistance with activity   - Consult OT/PT to assist with strengthening/mobility   - Keep Call bell within reach  - Keep bed low and locked with side rails adjusted as appropriate  - Keep care items and personal belongings within reach  - Initiate and maintain comfort rounds  - Make Fall Risk Sign visible to staff  - Apply yellow socks and bracelet for high fall risk patients  - Consider moving patient to room near nurses station  Outcome: Progressing  Goal: Maintain or return to baseline ADL function  Description: INTERVENTIONS:  -  Assess patient's ability to carry out ADLs; assess patient's baseline for ADL function and identify physical deficits which impact ability to perform ADLs (bathing, care of mouth/teeth, toileting, grooming, dressing, etc )  - Assess/evaluate cause of self-care deficits   - Assess range of motion  - Assess patient's mobility; develop plan if impaired  - Assess patient's need for assistive devices and provide as appropriate  - Encourage maximum independence but intervene and supervise when necessary  - Involve family in performance of ADLs  - Assess for home care needs following discharge   - Consider OT consult to assist with ADL evaluation and planning for discharge  - Provide patient education as appropriate  Outcome: Progressing  Goal: Maintains/Returns to pre admission functional level  Description: INTERVENTIONS:  - Perform BMAT or MOVE assessment daily    - Set and communicate daily mobility goal to care team and patient/family/caregiver     - Collaborate with rehabilitation services on mobility goals if consulted  - Out of bed for toileting  - Record patient progress and toleration of activity level   Outcome: Progressing     Problem: DISCHARGE PLANNING  Goal: Discharge to home or other facility with appropriate resources  Description: INTERVENTIONS:  - Identify barriers to discharge w/patient and caregiver  - Arrange for needed discharge resources and transportation as appropriate  - Identify discharge learning needs (meds, wound care, etc )  - Arrange for interpretive services to assist at discharge as needed  - Refer to Case Management Department for coordinating discharge planning if the patient needs post-hospital services based on physician/advanced practitioner order or complex needs related to functional status, cognitive ability, or social support system  Outcome: Progressing     Problem: Knowledge Deficit  Goal: Patient/family/caregiver demonstrates understanding of disease process, treatment plan, medications, and discharge instructions  Description: Complete learning assessment and assess knowledge base    Interventions:  - Provide teaching at level of understanding  - Provide teaching via preferred learning methods  Outcome: Progressing

## 2021-08-13 VITALS
HEART RATE: 86 BPM | SYSTOLIC BLOOD PRESSURE: 123 MMHG | HEIGHT: 64 IN | BODY MASS INDEX: 35.17 KG/M2 | WEIGHT: 206 LBS | OXYGEN SATURATION: 97 % | TEMPERATURE: 99 F | RESPIRATION RATE: 18 BRPM | DIASTOLIC BLOOD PRESSURE: 75 MMHG

## 2021-08-13 LAB
BASOPHILS # BLD AUTO: 0.02 THOUSANDS/ΜL (ref 0–0.1)
BASOPHILS NFR BLD AUTO: 0 % (ref 0–1)
DME PARACHUTE DELIVERY DATE ACTUAL: NORMAL
DME PARACHUTE DELIVERY DATE EXPECTED: NORMAL
DME PARACHUTE DELIVERY DATE REQUESTED: NORMAL
DME PARACHUTE ITEM DESCRIPTION: NORMAL
DME PARACHUTE ORDER STATUS: NORMAL
DME PARACHUTE SUPPLIER NAME: NORMAL
DME PARACHUTE SUPPLIER PHONE: NORMAL
EOSINOPHIL # BLD AUTO: 0.1 THOUSAND/ΜL (ref 0–0.61)
EOSINOPHIL NFR BLD AUTO: 2 % (ref 0–6)
ERYTHROCYTE [DISTWIDTH] IN BLOOD BY AUTOMATED COUNT: 17 % (ref 11.6–15.1)
HCT VFR BLD AUTO: 28.1 % (ref 34.8–46.1)
HGB BLD-MCNC: 8.6 G/DL (ref 11.5–15.4)
IMM GRANULOCYTES # BLD AUTO: 0.04 THOUSAND/UL (ref 0–0.2)
IMM GRANULOCYTES NFR BLD AUTO: 1 % (ref 0–2)
INR PPP: 1.9 (ref 0.84–1.19)
LYMPHOCYTES # BLD AUTO: 1.74 THOUSANDS/ΜL (ref 0.6–4.47)
LYMPHOCYTES NFR BLD AUTO: 34 % (ref 14–44)
MCH RBC QN AUTO: 28.7 PG (ref 26.8–34.3)
MCHC RBC AUTO-ENTMCNC: 30.6 G/DL (ref 31.4–37.4)
MCV RBC AUTO: 94 FL (ref 82–98)
MONOCYTES # BLD AUTO: 0.54 THOUSAND/ΜL (ref 0.17–1.22)
MONOCYTES NFR BLD AUTO: 11 % (ref 4–12)
NEUTROPHILS # BLD AUTO: 2.68 THOUSANDS/ΜL (ref 1.85–7.62)
NEUTS SEG NFR BLD AUTO: 52 % (ref 43–75)
NRBC BLD AUTO-RTO: 0 /100 WBCS
PLATELET # BLD AUTO: 232 THOUSANDS/UL (ref 149–390)
PMV BLD AUTO: 10.2 FL (ref 8.9–12.7)
PROTHROMBIN TIME: 21.4 SECONDS (ref 11.6–14.5)
RBC # BLD AUTO: 3 MILLION/UL (ref 3.81–5.12)
WBC # BLD AUTO: 5.12 THOUSAND/UL (ref 4.31–10.16)

## 2021-08-13 PROCEDURE — 85610 PROTHROMBIN TIME: CPT | Performed by: FAMILY MEDICINE

## 2021-08-13 PROCEDURE — 85025 COMPLETE CBC W/AUTO DIFF WBC: CPT | Performed by: FAMILY MEDICINE

## 2021-08-13 PROCEDURE — 99239 HOSP IP/OBS DSCHRG MGMT >30: CPT | Performed by: FAMILY MEDICINE

## 2021-08-13 RX ORDER — WARFARIN SODIUM 4 MG/1
4 TABLET ORAL
Qty: 20 TABLET | Refills: 0 | Status: SHIPPED | OUTPATIENT
Start: 2021-08-13

## 2021-08-13 RX ORDER — ASCORBIC ACID 500 MG
500 TABLET ORAL DAILY
Qty: 30 TABLET | Refills: 0 | Status: SHIPPED | OUTPATIENT
Start: 2021-08-14

## 2021-08-13 RX ORDER — FERROUS SULFATE 325(65) MG
325 TABLET ORAL
Qty: 30 TABLET | Refills: 0 | Status: SHIPPED | OUTPATIENT
Start: 2021-08-13

## 2021-08-13 RX ORDER — PANTOPRAZOLE SODIUM 40 MG/1
40 TABLET, DELAYED RELEASE ORAL
Qty: 60 TABLET | Refills: 0 | Status: SHIPPED | OUTPATIENT
Start: 2021-08-13 | End: 2021-09-29

## 2021-08-13 RX ADMIN — PANTOPRAZOLE SODIUM 40 MG: 40 TABLET, DELAYED RELEASE ORAL at 08:20

## 2021-08-13 RX ADMIN — METOPROLOL SUCCINATE 50 MG: 50 TABLET, EXTENDED RELEASE ORAL at 08:18

## 2021-08-13 RX ADMIN — OXYCODONE HYDROCHLORIDE AND ACETAMINOPHEN 500 MG: 500 TABLET ORAL at 08:19

## 2021-08-13 RX ADMIN — FERROUS SULFATE TAB 325 MG (65 MG ELEMENTAL FE) 325 MG: 325 (65 FE) TAB at 08:18

## 2021-08-13 NOTE — PLAN OF CARE
Problem: Potential for Falls  Goal: Patient will remain free of falls  Description: INTERVENTIONS:  - Educate patient/family on patient safety including physical limitations  - Instruct patient to call for assistance with activity   - Consult OT/PT to assist with strengthening/mobility   - Keep Call bell within reach  - Keep bed low and locked with side rails adjusted as appropriate  - Keep care items and personal belongings within reach  - Initiate and maintain comfort rounds  - Make Fall Risk Sign visible to staff  - Apply yellow socks and bracelet for high fall risk patients  - Consider moving patient to room near nurses station  Outcome: Progressing     Problem: CARDIOVASCULAR - ADULT  Goal: Maintains optimal cardiac output and hemodynamic stability  Description: INTERVENTIONS:  - Monitor I/O, vital signs and rhythm  - Monitor for S/S and trends of decreased cardiac output  - Administer and titrate ordered vasoactive medications to optimize hemodynamic stability  - Assess quality of pulses, skin color and temperature  - Assess for signs of decreased coronary artery perfusion  - Instruct patient to report change in severity of symptoms  Outcome: Progressing     Problem: GASTROINTESTINAL - ADULT  Goal: Maintains or returns to baseline bowel function  Description: INTERVENTIONS:  - Assess bowel function  - Encourage oral fluids to ensure adequate hydration  - Administer IV fluids if ordered to ensure adequate hydration  - Administer ordered medications as needed  - Encourage mobilization and activity  - Consider nutritional services referral to assist patient with adequate nutrition and appropriate food choices  Outcome: Progressing  Goal: Maintains adequate nutritional intake  Description: INTERVENTIONS:  - Monitor percentage of each meal consumed  - Identify factors contributing to decreased intake, treat as appropriate  - Assist with meals as needed  - Monitor I&O, weight, and lab values if indicated  - Obtain nutrition services referral as needed  Outcome: Progressing     Problem: MOBILITY - ADULT  Goal: Maintain or return to baseline ADL function  Description: INTERVENTIONS:  -  Assess patient's ability to carry out ADLs; assess patient's baseline for ADL function and identify physical deficits which impact ability to perform ADLs (bathing, care of mouth/teeth, toileting, grooming, dressing, etc )  - Assess/evaluate cause of self-care deficits   - Assess range of motion  - Assess patient's mobility; develop plan if impaired  - Assess patient's need for assistive devices and provide as appropriate  - Encourage maximum independence but intervene and supervise when necessary  - Involve family in performance of ADLs  - Assess for home care needs following discharge   - Consider OT consult to assist with ADL evaluation and planning for discharge  - Provide patient education as appropriate  Outcome: Progressing  Goal: Maintains/Returns to pre admission functional level  Description: INTERVENTIONS:  - Perform BMAT or MOVE assessment daily    - Set and communicate daily mobility goal to care team and patient/family/caregiver     - Collaborate with rehabilitation services on mobility goals if consulted  - Out of bed for toileting  - Record patient progress and toleration of activity level   Outcome: Progressing     Problem: Prexisting or High Potential for Compromised Skin Integrity  Goal: Skin integrity is maintained or improved  Description: INTERVENTIONS:  - Identify patients at risk for skin breakdown  - Assess and monitor skin integrity  - Assess and monitor nutrition and hydration status  - Monitor labs   - Assess for incontinence   - Turn and reposition patient  - Assist with mobility/ambulation  - Relieve pressure over bony prominences  - Avoid friction and shearing  - Provide appropriate hygiene as needed including keeping skin clean and dry  - Evaluate need for skin moisturizer/barrier cream  - Collaborate with interdisciplinary team   - Patient/family teaching  - Consider wound care consult   Outcome: Progressing     Problem: PAIN - ADULT  Goal: Verbalizes/displays adequate comfort level or baseline comfort level  Description: Interventions:  - Encourage patient to monitor pain and request assistance  - Assess pain using appropriate pain scale  - Administer analgesics based on type and severity of pain and evaluate response  - Implement non-pharmacological measures as appropriate and evaluate response  - Consider cultural and social influences on pain and pain management  - Notify physician/advanced practitioner if interventions unsuccessful or patient reports new pain  Outcome: Progressing     Problem: INFECTION - ADULT  Goal: Absence or prevention of progression during hospitalization  Description: INTERVENTIONS:  - Assess and monitor for signs and symptoms of infection  - Monitor lab/diagnostic results  - Monitor all insertion sites, i e  indwelling lines, tubes, and drains  - Monitor endotracheal if appropriate and nasal secretions for changes in amount and color  - Millen appropriate cooling/warming therapies per order  - Administer medications as ordered  - Instruct and encourage patient and family to use good hand hygiene technique  - Identify and instruct in appropriate isolation precautions for identified infection/condition  Outcome: Progressing     Problem: SAFETY ADULT  Goal: Patient will remain free of falls  Description: INTERVENTIONS:  - Educate patient/family on patient safety including physical limitations  - Instruct patient to call for assistance with activity   - Consult OT/PT to assist with strengthening/mobility   - Keep Call bell within reach  - Keep bed low and locked with side rails adjusted as appropriate  - Keep care items and personal belongings within reach  - Initiate and maintain comfort rounds  - Make Fall Risk Sign visible to staff  - Apply yellow socks and bracelet for high fall risk patients  - Consider moving patient to room near nurses station  Outcome: Progressing  Goal: Maintain or return to baseline ADL function  Description: INTERVENTIONS:  -  Assess patient's ability to carry out ADLs; assess patient's baseline for ADL function and identify physical deficits which impact ability to perform ADLs (bathing, care of mouth/teeth, toileting, grooming, dressing, etc )  - Assess/evaluate cause of self-care deficits   - Assess range of motion  - Assess patient's mobility; develop plan if impaired  - Assess patient's need for assistive devices and provide as appropriate  - Encourage maximum independence but intervene and supervise when necessary  - Involve family in performance of ADLs  - Assess for home care needs following discharge   - Consider OT consult to assist with ADL evaluation and planning for discharge  - Provide patient education as appropriate  Outcome: Progressing  Goal: Maintains/Returns to pre admission functional level  Description: INTERVENTIONS:  - Perform BMAT or MOVE assessment daily    - Set and communicate daily mobility goal to care team and patient/family/caregiver     - Collaborate with rehabilitation services on mobility goals if consulted  - Out of bed for toileting  - Record patient progress and toleration of activity level   Outcome: Progressing     Problem: DISCHARGE PLANNING  Goal: Discharge to home or other facility with appropriate resources  Description: INTERVENTIONS:  - Identify barriers to discharge w/patient and caregiver  - Arrange for needed discharge resources and transportation as appropriate  - Identify discharge learning needs (meds, wound care, etc )  - Arrange for interpretive services to assist at discharge as needed  - Refer to Case Management Department for coordinating discharge planning if the patient needs post-hospital services based on physician/advanced practitioner order or complex needs related to functional status, cognitive ability, or social support system  Outcome: Progressing     Problem: Knowledge Deficit  Goal: Patient/family/caregiver demonstrates understanding of disease process, treatment plan, medications, and discharge instructions  Description: Complete learning assessment and assess knowledge base    Interventions:  - Provide teaching at level of understanding  - Provide teaching via preferred learning methods  Outcome: Progressing

## 2021-08-13 NOTE — ASSESSMENT & PLAN NOTE
· Melanotic stool on admission with acute blood loss anemia requiring 2 units PRBC  · Status post EGD with nonbleeding duodenal ulcers  · She is status post EGD and colonoscopy done on August 10th EGD showed no acute bleeding and ulcer is healing colonoscopy no acute bleeding but there is a large polyp that will need to be evaluated as outpatient in 1 month follow-up GI to discuss elective colonoscopy for polypectomy and biopsy  Hemoglobin improved to 8 6 on iron supplements for absorption  She has more melenic stools and no evidence of bloody stools as well    Transfuse if less than 7 will discharge home will repeat labs on August 16 with ambulatory referral to Gastroenterology  Results from last 7 days   Lab Units 08/13/21  0515 08/12/21  0518 08/11/21  0439 08/10/21  1820 08/10/21  0607 08/09/21  0604 08/08/21  0447 08/07/21  0547 08/06/21  1954 08/06/21  1422   HEMOGLOBIN g/dL 8 6* 8 3* 7 9* 9 0* 7 7* 7 8* 7 6* 7 7* 8 0* 8 8*

## 2021-08-13 NOTE — ASSESSMENT & PLAN NOTE
· Continue metoprolol    INR supratherapeutic on admission requiring vitamin K and Kcentra INR is subtherapeutic at 1 8  · She restarted Coumadin on August 10th she got 5 mg she is very sensitive to Coumadin as her INR ready is up to 1 9 with a lower dose of 2 5 in 3 INR has been steady at 1 9 outpatient full increased to 4 mg daily to have an INR repeated on Monday prior to further doses changes    Results from last 7 days   Lab Units 08/13/21  0515 08/12/21  0518 08/11/21  0439 08/10/21  0607 08/09/21  0604 08/08/21  0447 08/07/21  0547   INR  1 90* 1 91* 1 90* 1 84* 1 76* 1 94* 1 82*

## 2021-08-13 NOTE — PLAN OF CARE
Problem: Potential for Falls  Goal: Patient will remain free of falls  Description: INTERVENTIONS:  - Educate patient/family on patient safety including physical limitations  - Instruct patient to call for assistance with activity   - Consult OT/PT to assist with strengthening/mobility   - Keep Call bell within reach  - Keep bed low and locked with side rails adjusted as appropriate  - Keep care items and personal belongings within reach  - Initiate and maintain comfort rounds  - Make Fall Risk Sign visible to staff  - Apply yellow socks and bracelet for high fall risk patients  - Consider moving patient to room near nurses station  Outcome: Progressing     Problem: CARDIOVASCULAR - ADULT  Goal: Maintains optimal cardiac output and hemodynamic stability  Description: INTERVENTIONS:  - Monitor I/O, vital signs and rhythm  - Monitor for S/S and trends of decreased cardiac output  - Administer and titrate ordered vasoactive medications to optimize hemodynamic stability  - Assess quality of pulses, skin color and temperature  - Assess for signs of decreased coronary artery perfusion  - Instruct patient to report change in severity of symptoms  Outcome: Progressing     Problem: GASTROINTESTINAL - ADULT  Goal: Maintains or returns to baseline bowel function  Description: INTERVENTIONS:  - Assess bowel function  - Encourage oral fluids to ensure adequate hydration  - Administer IV fluids if ordered to ensure adequate hydration  - Administer ordered medications as needed  - Encourage mobilization and activity  - Consider nutritional services referral to assist patient with adequate nutrition and appropriate food choices  Outcome: Progressing  Goal: Maintains adequate nutritional intake  Description: INTERVENTIONS:  - Monitor percentage of each meal consumed  - Identify factors contributing to decreased intake, treat as appropriate  - Assist with meals as needed  - Monitor I&O, weight, and lab values if indicated  - Obtain nutrition services referral as needed  Outcome: Progressing     Problem: MOBILITY - ADULT  Goal: Maintain or return to baseline ADL function  Description: INTERVENTIONS:  -  Assess patient's ability to carry out ADLs; assess patient's baseline for ADL function and identify physical deficits which impact ability to perform ADLs (bathing, care of mouth/teeth, toileting, grooming, dressing, etc )  - Assess/evaluate cause of self-care deficits   - Assess range of motion  - Assess patient's mobility; develop plan if impaired  - Assess patient's need for assistive devices and provide as appropriate  - Encourage maximum independence but intervene and supervise when necessary  - Involve family in performance of ADLs  - Assess for home care needs following discharge   - Consider OT consult to assist with ADL evaluation and planning for discharge  - Provide patient education as appropriate  Outcome: Progressing  Goal: Maintains/Returns to pre admission functional level  Description: INTERVENTIONS:  - Perform BMAT or MOVE assessment daily    - Set and communicate daily mobility goal to care team and patient/family/caregiver     - Collaborate with rehabilitation services on mobility goals if consulted  - Out of bed for toileting  - Record patient progress and toleration of activity level   Outcome: Progressing     Problem: Prexisting or High Potential for Compromised Skin Integrity  Goal: Skin integrity is maintained or improved  Description: INTERVENTIONS:  - Identify patients at risk for skin breakdown  - Assess and monitor skin integrity  - Assess and monitor nutrition and hydration status  - Monitor labs   - Assess for incontinence   - Turn and reposition patient  - Assist with mobility/ambulation  - Relieve pressure over bony prominences  - Avoid friction and shearing  - Provide appropriate hygiene as needed including keeping skin clean and dry  - Evaluate need for skin moisturizer/barrier cream  - Collaborate with interdisciplinary team   - Patient/family teaching  - Consider wound care consult   Outcome: Progressing     Problem: PAIN - ADULT  Goal: Verbalizes/displays adequate comfort level or baseline comfort level  Description: Interventions:  - Encourage patient to monitor pain and request assistance  - Assess pain using appropriate pain scale  - Administer analgesics based on type and severity of pain and evaluate response  - Implement non-pharmacological measures as appropriate and evaluate response  - Consider cultural and social influences on pain and pain management  - Notify physician/advanced practitioner if interventions unsuccessful or patient reports new pain  Outcome: Progressing     Problem: INFECTION - ADULT  Goal: Absence or prevention of progression during hospitalization  Description: INTERVENTIONS:  - Assess and monitor for signs and symptoms of infection  - Monitor lab/diagnostic results  - Monitor all insertion sites, i e  indwelling lines, tubes, and drains  - Monitor endotracheal if appropriate and nasal secretions for changes in amount and color  - [place] appropriate cooling/warming therapies per order  - Administer medications as ordered  - Instruct and encourage patient and family to use good hand hygiene technique  - Identify and instruct in appropriate isolation precautions for identified infection/condition  Outcome: Progressing     Problem: SAFETY ADULT  Goal: Patient will remain free of falls  Description: INTERVENTIONS:  - Educate patient/family on patient safety including physical limitations  - Instruct patient to call for assistance with activity   - Consult OT/PT to assist with strengthening/mobility   - Keep Call bell within reach  - Keep bed low and locked with side rails adjusted as appropriate  - Keep care items and personal belongings within reach  - Initiate and maintain comfort rounds  - Make Fall Risk Sign visible to staff  - Apply yellow socks and bracelet for high fall risk patients  - Consider moving patient to room near nurses station  Outcome: Progressing  Goal: Maintain or return to baseline ADL function  Description: INTERVENTIONS:  -  Assess patient's ability to carry out ADLs; assess patient's baseline for ADL function and identify physical deficits which impact ability to perform ADLs (bathing, care of mouth/teeth, toileting, grooming, dressing, etc )  - Assess/evaluate cause of self-care deficits   - Assess range of motion  - Assess patient's mobility; develop plan if impaired  - Assess patient's need for assistive devices and provide as appropriate  - Encourage maximum independence but intervene and supervise when necessary  - Involve family in performance of ADLs  - Assess for home care needs following discharge   - Consider OT consult to assist with ADL evaluation and planning for discharge  - Provide patient education as appropriate  Outcome: Progressing  Goal: Maintains/Returns to pre admission functional level  Description: INTERVENTIONS:  - Perform BMAT or MOVE assessment daily    - Set and communicate daily mobility goal to care team and patient/family/caregiver     - Collaborate with rehabilitation services on mobility goals if consulted  - Out of bed for toileting  - Record patient progress and toleration of activity level   Outcome: Progressing     Problem: DISCHARGE PLANNING  Goal: Discharge to home or other facility with appropriate resources  Description: INTERVENTIONS:  - Identify barriers to discharge w/patient and caregiver  - Arrange for needed discharge resources and transportation as appropriate  - Identify discharge learning needs (meds, wound care, etc )  - Arrange for interpretive services to assist at discharge as needed  - Refer to Case Management Department for coordinating discharge planning if the patient needs post-hospital services based on physician/advanced practitioner order or complex needs related to functional status, cognitive ability, or social support system  Outcome: Progressing     Problem: Knowledge Deficit  Goal: Patient/family/caregiver demonstrates understanding of disease process, treatment plan, medications, and discharge instructions  Description: Complete learning assessment and assess knowledge base    Interventions:  - Provide teaching at level of understanding  - Provide teaching via preferred learning methods  Outcome: Progressing

## 2021-08-13 NOTE — CASE MANAGEMENT
Case Management Progress Note    Patient name Bijal Kovacs  Location /-42 MRN [id number]  : 1940 Date 2021       LOS (days): 8  Geometric Mean LOS (GMLOS) (days): 4 40  Days to GMLOS:-3 8        BUNDLE:    OBJECTIVE:  Pt is a 78y o  year old , white or  [1], female with [employer] preference of [employer] admitted on   4:02 AM  [id number] with complaints of Hemorrhagic shock due to gastrointestinal bleeding   Current admission status: Inpatient  Preferred Pharmacy:   [address] [place] [id number]  Phone: [phone] Fax: [phone]    [facility]   Ofelia Singh [address] [place] [address]  Phone: [phone] Fax: [phone] [address] [address]  Phone: [phone] Fax: [phone]    Primary Care Provider: Estefania Wheat DO    Primary Insurance: MEDICARE  Secondary Insurance:     PROGRESS NOTE:  Pt discussed w/ MD, for d/c today  MD requesting INR draw on Monday, CM updated [facility]  CM also reached out to [facility] re: delivery of nocturnal O2, they have been in contact w/ the son  CM confirmed w/ son that [facility] will deliver when Pt/son return home  Contact info on AVS  AVS faxed to [facility] via Epic  Addendum:  Pt/son requesting RW, had been using late hus' bariatric RW which is too large  CM ordered through [facility], discussed w/ [facility]'s liaison and  will delivery w/ O2   CM updated Pt/son, and Nsg

## 2021-09-21 LAB
DME PARACHUTE DELIVERY DATE ACTUAL: NORMAL
DME PARACHUTE DELIVERY DATE REQUESTED: NORMAL
DME PARACHUTE DELIVERY NOTE: NORMAL
DME PARACHUTE ITEM DESCRIPTION: NORMAL
DME PARACHUTE ORDER STATUS: NORMAL
DME PARACHUTE SUPPLIER NAME: NORMAL
DME PARACHUTE SUPPLIER PHONE: NORMAL

## 2021-09-29 ENCOUNTER — TELEPHONE (OUTPATIENT)
Dept: PREADMISSION TESTING | Facility: HOSPITAL | Age: 81
End: 2021-09-29

## 2021-09-29 VITALS — BODY MASS INDEX: 36.05 KG/M2 | WEIGHT: 210 LBS

## 2021-09-29 RX ORDER — CHOLECALCIFEROL (VITAMIN D3) 50 MCG
2000 TABLET ORAL DAILY
COMMUNITY

## 2021-09-29 RX ORDER — CALCIUM CARBONATE 200(500)MG
1 TABLET,CHEWABLE ORAL AS NEEDED
COMMUNITY

## 2021-09-30 DIAGNOSIS — I48.20 CHRONIC ATRIAL FIBRILLATION, UNSPECIFIED (HCC): ICD-10-CM

## 2021-09-30 DIAGNOSIS — E66.01 MORBID (SEVERE) OBESITY DUE TO EXCESS CALORIES (HCC): ICD-10-CM

## 2021-09-30 DIAGNOSIS — I10 ESSENTIAL (PRIMARY) HYPERTENSION: ICD-10-CM

## 2021-09-30 DIAGNOSIS — Z86.718 PERSONAL HISTORY OF OTHER VENOUS THROMBOSIS AND EMBOLISM: ICD-10-CM

## 2021-09-30 DIAGNOSIS — R06.02 SHORTNESS OF BREATH: ICD-10-CM

## 2021-09-30 DIAGNOSIS — E78.5 HYPERLIPIDEMIA, UNSPECIFIED: ICD-10-CM

## 2021-09-30 DIAGNOSIS — I50.32 CHRONIC DIASTOLIC (CONGESTIVE) HEART FAILURE (HCC): ICD-10-CM

## 2021-09-30 RX ORDER — FUROSEMIDE 20 MG/1
20 TABLET ORAL 2 TIMES DAILY
COMMUNITY

## 2021-10-01 ENCOUNTER — TELEPHONE (OUTPATIENT)
Dept: SURGERY | Facility: HOSPITAL | Age: 81
End: 2021-10-01

## 2022-09-22 ENCOUNTER — TELEPHONE (OUTPATIENT)
Dept: NON INVASIVE DIAGNOSTICS | Facility: HOSPITAL | Age: 82
End: 2022-09-22

## 2022-09-23 ENCOUNTER — TELEPHONE (OUTPATIENT)
Dept: NON INVASIVE DIAGNOSTICS | Facility: HOSPITAL | Age: 82
End: 2022-09-23

## 2022-10-17 ENCOUNTER — HOSPITAL ENCOUNTER (OUTPATIENT)
Dept: NON INVASIVE DIAGNOSTICS | Facility: HOSPITAL | Age: 82
Discharge: HOME | End: 2022-10-17
Payer: MEDICARE

## 2022-10-17 VITALS
SYSTOLIC BLOOD PRESSURE: 128 MMHG | BODY MASS INDEX: 38.41 KG/M2 | HEIGHT: 64 IN | DIASTOLIC BLOOD PRESSURE: 72 MMHG | WEIGHT: 225 LBS | HEART RATE: 80 BPM

## 2022-10-17 DIAGNOSIS — E66.01 MORBID (SEVERE) OBESITY DUE TO EXCESS CALORIES (HCC): ICD-10-CM

## 2022-10-17 DIAGNOSIS — Z86.718 PERSONAL HISTORY OF OTHER VENOUS THROMBOSIS AND EMBOLISM: ICD-10-CM

## 2022-10-17 DIAGNOSIS — I10 ESSENTIAL (PRIMARY) HYPERTENSION: ICD-10-CM

## 2022-10-17 DIAGNOSIS — E78.5 HYPERLIPIDEMIA, UNSPECIFIED: ICD-10-CM

## 2022-10-17 DIAGNOSIS — I48.20 CHRONIC ATRIAL FIBRILLATION, UNSPECIFIED (HCC): ICD-10-CM

## 2022-10-17 DIAGNOSIS — I50.32 CHRONIC DIASTOLIC (CONGESTIVE) HEART FAILURE (HCC): ICD-10-CM

## 2022-10-17 LAB
AORTIC ROOT: 3.1 CM
AORTIC VALVE MEAN VELOCITY: 25.2 M/S
APICAL FOUR CHAMBER EJECTION FRACTION: 57 %
ASCENDING AORTA: 3.1 CM
AV AREA BY CONTINUOUS VTI: 0.7 CM2
AV AREA PEAK VELOCITY: 0.7 CM2
AV LVOT MEAN GRADIENT: 2 MMHG
AV LVOT PEAK GRADIENT: 3 MMHG
AV MEAN GRADIENT: 28 MMHG
AV PEAK GRADIENT: 50 MMHG
AV VALVE AREA: 0.68 CM2
AV VELOCITY RATIO: 0.24
DOP CALC AO PEAK VEL: 3.52 M/S
DOP CALC AO VTI: 69.73 CM
DOP CALC LVOT AREA: 3.14 CM2
DOP CALC LVOT DIAMETER: 2 CM
DOP CALC LVOT PEAK VEL VTI: 15.14 CM
DOP CALC LVOT PEAK VEL: 0.83 M/S
DOP CALC LVOT STROKE INDEX: 23.5 ML/M2
DOP CALC LVOT STROKE VOLUME: 47.54
DOP CALC MV VTI: 30.72 CM
FRACTIONAL SHORTENING: 30 (ref 28–44)
INTERVENTRICULAR SEPTUM IN DIASTOLE (PARASTERNAL SHORT AXIS VIEW): 1 CM
INTERVENTRICULAR SEPTUM: 1 CM (ref 0.6–1.1)
LAAS-AP2: 25.9 CM2
LAAS-AP4: 33 CM2
LEFT ATRIUM SIZE: 5 CM
LEFT INTERNAL DIMENSION IN SYSTOLE: 3 CM (ref 2.1–4)
LEFT VENTRICULAR INTERNAL DIMENSION IN DIASTOLE: 4.3 CM (ref 3.5–6)
LEFT VENTRICULAR POSTERIOR WALL IN END DIASTOLE: 1 CM
LEFT VENTRICULAR STROKE VOLUME: 48 ML
LVSV (TEICH): 48 ML
MV MEAN GRADIENT: 4 MMHG
MV PEAK GRADIENT: 9 MMHG
MV STENOSIS PRESSURE HALF TIME: 114 MS
MV VALVE AREA BY CONTINUITY EQUATION: 1.55 CM2
MV VALVE AREA P 1/2 METHOD: 1.93
RIGHT ATRIAL 2D VOLUME: 51 ML
RIGHT ATRIUM AREA SYSTOLE A4C: 19.5 CM2
RIGHT VENTRICLE ID DIMENSION: 3.8 CM
SL CV LEFT ATRIUM LENGTH A2C: 6.5 CM
SL CV PED ECHO LEFT VENTRICLE DIASTOLIC VOLUME (MOD BIPLANE) 2D: 82 ML
SL CV PED ECHO LEFT VENTRICLE SYSTOLIC VOLUME (MOD BIPLANE) 2D: 34 ML
TR MAX PG: 44 MMHG
TR PEAK VELOCITY: 3.3 M/S
TRICUSPID VALVE PEAK REGURGITATION VELOCITY: 3.32 M/S

## 2022-10-17 PROCEDURE — 93306 TTE W/DOPPLER COMPLETE: CPT

## 2023-10-05 ENCOUNTER — OFFICE VISIT (OUTPATIENT)
Dept: URGENT CARE | Facility: CLINIC | Age: 83
End: 2023-10-05
Payer: MEDICARE

## 2023-10-05 VITALS
DIASTOLIC BLOOD PRESSURE: 64 MMHG | OXYGEN SATURATION: 96 % | HEART RATE: 74 BPM | BODY MASS INDEX: 39.87 KG/M2 | SYSTOLIC BLOOD PRESSURE: 110 MMHG | WEIGHT: 225 LBS | HEIGHT: 63 IN | TEMPERATURE: 97.3 F | RESPIRATION RATE: 22 BRPM

## 2023-10-05 DIAGNOSIS — J06.9 ACUTE URI: Primary | ICD-10-CM

## 2023-10-05 DIAGNOSIS — R05.1 ACUTE COUGH: ICD-10-CM

## 2023-10-05 LAB
SARS-COV-2 AG UPPER RESP QL IA: NEGATIVE
VALID CONTROL: NORMAL

## 2023-10-05 PROCEDURE — 99203 OFFICE O/P NEW LOW 30 MIN: CPT

## 2023-10-05 PROCEDURE — 87811 SARS-COV-2 COVID19 W/OPTIC: CPT

## 2023-10-05 PROCEDURE — G0463 HOSPITAL OUTPT CLINIC VISIT: HCPCS

## 2023-10-05 RX ORDER — OMEPRAZOLE 10 MG/1
10 CAPSULE, DELAYED RELEASE ORAL DAILY
COMMUNITY

## 2023-10-05 RX ORDER — BENZONATATE 100 MG/1
100 CAPSULE ORAL 3 TIMES DAILY PRN
Qty: 20 CAPSULE | Refills: 0 | Status: SHIPPED | OUTPATIENT
Start: 2023-10-05

## 2023-10-05 RX ORDER — AMOXICILLIN 500 MG/1
500 CAPSULE ORAL EVERY 12 HOURS SCHEDULED
Qty: 14 CAPSULE | Refills: 0 | Status: SHIPPED | OUTPATIENT
Start: 2023-10-05 | End: 2023-10-12

## 2023-10-05 RX ORDER — LISINOPRIL 5 MG/1
5 TABLET ORAL DAILY
COMMUNITY
Start: 2023-08-17

## 2023-10-05 RX ORDER — ATORVASTATIN CALCIUM 40 MG/1
40 TABLET, FILM COATED ORAL DAILY
COMMUNITY
Start: 2023-08-17

## 2023-10-05 NOTE — PATIENT INSTRUCTIONS
Take antibiotic as prescribed  Take Tessalon as needed for cough  Plenty of fluids  Can use honey   Cool mist humidifier   Warm gargle with salt water for sore throat   Use Tylenol/ibuprofen as needed for fever or pain   Follow up with PCP in 3-5 days. Proceed to  ER if symptoms worsen.

## 2023-10-05 NOTE — PROGRESS NOTES
[facility]        NAME: Alessandra Villalobos is a 78 y.o. female  : 1940    MRN: [id number]  DATE: 2023  TIME: 12:32 PM    Assessment and Plan   Acute URI [J06.9]  1. Acute URI  amoxicillin (AMOXIL) 500 mg capsule    benzonatate (TESSALON PERLES) 100 mg capsule      2. Acute cough  Poct Covid 19 Rapid Antigen Test        Rapid covid: neg     Patient on Warfarin so unable to take azithro or doxy. Patient stated she doesn't recall her allergy to Keflex since it has been so long ago but is willing to try Amoxicillin since she doesn't remember having an allergy to it previously. Advised she start taking and if any reaction to stop, take benadryl if needed, and call office to try different medication if still symptomatic. Patient verbalized understanding. Patient Instructions     Take antibiotic as prescribed  Take Tessalon as needed for cough  Plenty of fluids  Can use honey   Cool mist humidifier   Warm gargle with salt water for sore throat   Use Tylenol/ibuprofen as needed for fever or pain   Follow up with PCP in 3-5 days. Proceed to  ER if symptoms worsen. Chief Complaint     Chief Complaint   Patient presents with   • Cold Like Symptoms     C/o nasal congestion associated with cough and chest congestion. Onset 5 days ago. Reports exertional dyspnea which is chronic. History of Present Illness       Cough  This is a new problem. Episode onset: 5 days. The cough is productive of sputum (sometimes). Pertinent negatives include no chest pain, chills, ear pain, fever, postnasal drip, rhinorrhea, sore throat, shortness of breath or wheezing. Treatments tried: Mucinex. Review of Systems   Review of Systems   Constitutional: Negative for chills, diaphoresis, fatigue and fever. HENT: Positive for congestion (nasal and chest congestion). Negative for ear pain, postnasal drip, rhinorrhea, sinus pressure, sore throat and trouble swallowing. Respiratory: Positive for cough. Negative for chest tightness, shortness of breath and wheezing. Cardiovascular: Negative for chest pain and palpitations. Skin: Negative for color change. Neurological: Negative for dizziness and light-headedness. Psychiatric/Behavioral: Negative for sleep disturbance.          Current Medications       Current Outpatient Medications:   •  acetaminophen (TYLENOL) 325 mg tablet, Take 650 mg by mouth every 6 (six) hours as needed for mild pain , Disp: , Rfl:   •  amoxicillin (AMOXIL) 500 mg capsule, Take 1 capsule (500 mg total) by mouth every 12 (twelve) hours for 7 days, Disp: 14 capsule, Rfl: 0  •  benzonatate (TESSALON PERLES) 100 mg capsule, Take 1 capsule (100 mg total) by mouth 3 (three) times a day as needed for cough, Disp: 20 capsule, Rfl: 0  •  calcium carbonate (TUMS) 500 mg chewable tablet, Chew 1 tablet as needed for indigestion or heartburn, Disp: , Rfl:   •  Cholecalciferol (Vitamin D) 50 MCG (2000 UT) tablet, Take 2,000 Units by mouth daily, Disp: , Rfl:   •  ferrous sulfate 325 (65 Fe) mg tablet, Take 1 tablet (325 mg total) by mouth daily with breakfast, Disp: 30 tablet, Rfl: 0  •  furosemide (LASIX) 20 mg tablet, Take 20 mg by mouth 2 (two) times a day, Disp: , Rfl:   •  Latanoprost 0.005 % EMUL, Apply to eye daily at bedtime Pt uses in right eye only, Disp: , Rfl:   •  lisinopril (ZESTRIL) 5 mg tablet, Take 5 mg by mouth daily, Disp: , Rfl:   •  metoprolol succinate (TOPROL-XL) 50 mg 24 hr tablet, Take 50 mg by mouth daily, Disp: , Rfl:   •  omeprazole (PriLOSEC) 10 mg delayed release capsule, Take 10 mg by mouth daily, Disp: , Rfl:   •  warfarin (COUMADIN) 4 mg tablet, Take 1 tablet (4 mg total) by mouth daily (Patient taking differently: Take 4 mg by mouth daily), Disp: 20 tablet, Rfl: 0  •  ascorbic acid (VITAMIN C) 500 MG tablet, Take 1 tablet (500 mg total) by mouth daily (Patient not taking: Reported on 10/5/2023), Disp: 30 tablet, Rfl: 0  •  atorvastatin (LIPITOR) 40 mg tablet, Take 40 mg by mouth daily, Disp: , Rfl:   •  loperamide (Imodium A-D) 2 mg capsule, Take 2 mg by mouth 4 (four) times a day as needed for diarrhea (Patient not taking: Reported on 9/29/2021), Disp: , Rfl:   •  pantoprazole (PROTONIX) 40 mg tablet, Take 1 tablet (40 mg total) by mouth 2 (two) times a day before meals, Disp: 60 tablet, Rfl: 0  •  simvastatin (ZOCOR) 10 mg tablet, Take 10 mg by mouth daily at bedtime (Patient not taking: Reported on 10/5/2023), Disp: , Rfl:     Current Allergies     Allergies as of 10/05/2023 - Reviewed 10/05/2023   Allergen Reaction Noted   • Nsaids GI Intolerance and Abdominal Pain 12/08/2013   • Cephalexin Other (See Comments) 07/31/2021   • Nabumetone Other (See Comments) 07/31/2021            The following portions of the patient's history were reviewed and updated as appropriate: allergies, current medications, past family history, past medical history, past social history, past surgical history and problem list.     Past Medical History:   Diagnosis Date   • Anemia    • Atrial fibrillation (HCC)    • Bradycardia    • Colon polyp    • GERD (gastroesophageal reflux disease)    • History of pulmonary embolus (PE)    • History of transfusion    • Hypertension    • Long term (current) use of anticoagulants    • Osteoarthritis    • Radiculopathy    • Shortness of breath 09/29/2021    Pt has been reporting increased episodes of SOB- lying down and with mod exertion. Pt states it has been keeping her up at night. • Vitamin D deficiency        Past Surgical History:   Procedure Laterality Date   • APPENDECTOMY     • BACK SURGERY     • COLONOSCOPY     • EGD     • EYE SURGERY     • JOINT REPLACEMENT         No family history on file. Medications have been verified.         Objective   /64   Pulse 74   Temp (!) 97.3 °F (36.3 °C)   Resp 22   Ht 5' 3" (1.6 m)   Wt 102 kg (225 lb)   SpO2 96%   BMI 39.86 kg/m²        Physical Exam     Physical Exam  Constitutional:       General: She is not in acute distress. Appearance: Normal appearance. She is not ill-appearing. HENT:      Head: Normocephalic. Right Ear: Tympanic membrane and external ear normal.      Left Ear: Tympanic membrane and external ear normal.      Nose: Congestion present. Mouth/Throat:      Mouth: Mucous membranes are moist.      Pharynx: Oropharynx is clear. No oropharyngeal exudate or posterior oropharyngeal erythema. Eyes:      Extraocular Movements: Extraocular movements intact. Pupils: Pupils are equal, round, and reactive to light. Cardiovascular:      Rate and Rhythm: Normal rate and regular rhythm. Pulses: Normal pulses. Heart sounds: Normal heart sounds. Pulmonary:      Effort: Pulmonary effort is normal. No respiratory distress. Breath sounds: No stridor. Wheezing (mild) present. No rhonchi or rales. Lymphadenopathy:      Cervical: No cervical adenopathy. Skin:     General: Skin is warm and dry. Neurological:      General: No focal deficit present. Mental Status: She is alert and oriented to person, place, and time. Mental status is at baseline. Psychiatric:         Mood and Affect: Mood normal.         Behavior: Behavior normal.         Thought Content:  Thought content normal.         Judgment: Judgment normal.

## 2023-10-31 ENCOUNTER — HOSPITAL ENCOUNTER (OUTPATIENT)
Dept: NON INVASIVE DIAGNOSTICS | Facility: HOSPITAL | Age: 83
Discharge: HOME | End: 2023-10-31
Payer: MEDICARE

## 2023-10-31 VITALS
HEIGHT: 63 IN | HEART RATE: 105 BPM | DIASTOLIC BLOOD PRESSURE: 64 MMHG | SYSTOLIC BLOOD PRESSURE: 110 MMHG | BODY MASS INDEX: 39.87 KG/M2 | WEIGHT: 225 LBS

## 2023-10-31 DIAGNOSIS — E66.01 MORBID (SEVERE) OBESITY DUE TO EXCESS CALORIES (HCC): ICD-10-CM

## 2023-10-31 DIAGNOSIS — I10 ESSENTIAL (PRIMARY) HYPERTENSION: ICD-10-CM

## 2023-10-31 DIAGNOSIS — Z86.718 PERSONAL HISTORY OF OTHER VENOUS THROMBOSIS AND EMBOLISM: ICD-10-CM

## 2023-10-31 DIAGNOSIS — I48.20 CHRONIC ATRIAL FIBRILLATION, UNSPECIFIED (HCC): ICD-10-CM

## 2023-10-31 DIAGNOSIS — I35.0 NONRHEUMATIC AORTIC (VALVE) STENOSIS: ICD-10-CM

## 2023-10-31 DIAGNOSIS — I50.32 CHRONIC DIASTOLIC (CONGESTIVE) HEART FAILURE (HCC): ICD-10-CM

## 2023-10-31 DIAGNOSIS — E78.5 HYPERLIPIDEMIA, UNSPECIFIED: ICD-10-CM

## 2023-10-31 LAB
AORTIC ROOT: 3 CM
AORTIC VALVE MEAN VELOCITY: 24.7 M/S
AV AREA BY CONTINUOUS VTI: 0.9 CM2
AV AREA PEAK VELOCITY: 0.8 CM2
AV LVOT MEAN GRADIENT: 2 MMHG
AV LVOT PEAK GRADIENT: 3 MMHG
AV MEAN GRADIENT: 28 MMHG
AV PEAK GRADIENT: 45 MMHG
AV VALVE AREA: 0.88 CM2
AV VELOCITY RATIO: 0.24
DOP CALC AO PEAK VEL: 3.36 M/S
DOP CALC AO VTI: 64.39 CM
DOP CALC LVOT AREA: 3.46 CM2
DOP CALC LVOT CARDIAC INDEX: 6.4 L/MIN/M2
DOP CALC LVOT CARDIAC OUTPUT: 12.99 L/MIN
DOP CALC LVOT DIAMETER: 2.1 CM
DOP CALC LVOT PEAK VEL VTI: 16.35 CM
DOP CALC LVOT PEAK VEL: 0.81 M/S
DOP CALC LVOT STROKE INDEX: 28.6 ML/M2
DOP CALC LVOT STROKE VOLUME: 56.6
DOP CALC MV VTI: 29.65 CM
FRACTIONAL SHORTENING: 33 (ref 28–44)
INTERVENTRICULAR SEPTUM IN DIASTOLE (PARASTERNAL SHORT AXIS VIEW): 1.6 CM
INTERVENTRICULAR SEPTUM: 1.6 CM (ref 0.6–1.1)
LAAS-AP2: 31.8 CM2
LAAS-AP4: 31.2 CM2
LEFT ATRIUM SIZE: 5.3 CM
LEFT ATRIUM VOLUME (MOD BIPLANE): 114 ML
LEFT ATRIUM VOLUME INDEX (MOD BIPLANE): 56.2 ML/M2
LEFT INTERNAL DIMENSION IN SYSTOLE: 3.3 CM (ref 2.1–4)
LEFT VENTRICULAR INTERNAL DIMENSION IN DIASTOLE: 4.9 CM (ref 3.5–6)
LEFT VENTRICULAR POSTERIOR WALL IN END DIASTOLE: 1.4 CM
LEFT VENTRICULAR STROKE VOLUME: 69 ML
LVSV (TEICH): 69 ML
MV E'TISSUE VEL-SEP: 10 CM/S
MV MEAN GRADIENT: 5 MMHG
MV PEAK GRADIENT: 13 MMHG
MV STENOSIS PRESSURE HALF TIME: 101 MS
MV VALVE AREA BY CONTINUITY EQUATION: 1.91 CM2
MV VALVE AREA P 1/2 METHOD: 2.18
RA PRESSURE ESTIMATED: 3 MMHG
RIGHT ATRIUM AREA SYSTOLE A4C: 18.7 CM2
RIGHT VENTRICLE ID DIMENSION: 2.8 CM
RV PSP: 55 MMHG
SL CV LEFT ATRIUM LENGTH A2C: 7 CM
SL CV LV EF: 50
SL CV PED ECHO LEFT VENTRICLE DIASTOLIC VOLUME (MOD BIPLANE) 2D: 112 ML
SL CV PED ECHO LEFT VENTRICLE SYSTOLIC VOLUME (MOD BIPLANE) 2D: 43 ML
TR MAX PG: 52 MMHG
TR PEAK VELOCITY: 3.6 M/S
TRICUSPID ANNULAR PLANE SYSTOLIC EXCURSION: 2 CM
TRICUSPID VALVE PEAK REGURGITATION VELOCITY: 3.59 M/S

## 2023-10-31 PROCEDURE — 93306 TTE W/DOPPLER COMPLETE: CPT

## 2024-01-02 ENCOUNTER — OFFICE VISIT (OUTPATIENT)
Dept: URGENT CARE | Facility: CLINIC | Age: 84
End: 2024-01-02
Payer: MEDICARE

## 2024-01-02 ENCOUNTER — APPOINTMENT (OUTPATIENT)
Dept: RADIOLOGY | Facility: CLINIC | Age: 84
End: 2024-01-02
Payer: MEDICARE

## 2024-01-02 VITALS
DIASTOLIC BLOOD PRESSURE: 70 MMHG | RESPIRATION RATE: 30 BRPM | SYSTOLIC BLOOD PRESSURE: 134 MMHG | WEIGHT: 215 LBS | HEIGHT: 64 IN | BODY MASS INDEX: 36.7 KG/M2 | OXYGEN SATURATION: 95 % | HEART RATE: 105 BPM | TEMPERATURE: 97.9 F

## 2024-01-02 DIAGNOSIS — B96.89 ACUTE BACTERIAL BRONCHITIS: Primary | ICD-10-CM

## 2024-01-02 DIAGNOSIS — R06.02 SHORTNESS OF BREATH: ICD-10-CM

## 2024-01-02 DIAGNOSIS — R05.1 ACUTE COUGH: ICD-10-CM

## 2024-01-02 DIAGNOSIS — J20.8 ACUTE BACTERIAL BRONCHITIS: Primary | ICD-10-CM

## 2024-01-02 LAB
SARS-COV-2 AG UPPER RESP QL IA: NEGATIVE
VALID CONTROL: NORMAL

## 2024-01-02 PROCEDURE — 71046 X-RAY EXAM CHEST 2 VIEWS: CPT

## 2024-01-02 PROCEDURE — 99214 OFFICE O/P EST MOD 30 MIN: CPT | Performed by: PHYSICIAN ASSISTANT

## 2024-01-02 PROCEDURE — 87811 SARS-COV-2 COVID19 W/OPTIC: CPT | Performed by: PHYSICIAN ASSISTANT

## 2024-01-02 PROCEDURE — G0463 HOSPITAL OUTPT CLINIC VISIT: HCPCS | Performed by: PHYSICIAN ASSISTANT

## 2024-01-02 RX ORDER — BENZONATATE 100 MG/1
100 CAPSULE ORAL 3 TIMES DAILY PRN
Qty: 20 CAPSULE | Refills: 0 | Status: SHIPPED | OUTPATIENT
Start: 2024-01-02

## 2024-01-02 RX ORDER — AMOXICILLIN AND CLAVULANATE POTASSIUM 875; 125 MG/1; MG/1
1 TABLET, FILM COATED ORAL EVERY 12 HOURS SCHEDULED
Qty: 14 TABLET | Refills: 0 | Status: SHIPPED | OUTPATIENT
Start: 2024-01-02 | End: 2024-01-04 | Stop reason: SDUPTHER

## 2024-01-02 RX ORDER — ALBUTEROL SULFATE 90 UG/1
2 AEROSOL, METERED RESPIRATORY (INHALATION) EVERY 6 HOURS PRN
Qty: 8.5 G | Refills: 0 | Status: SHIPPED | OUTPATIENT
Start: 2024-01-02

## 2024-01-02 NOTE — PATIENT INSTRUCTIONS
Take Tessalon and albuterol inhaler as prescribed  Take over the counter Mucinex during the day  Fluids and rest (Warm water with honey and lemon)  Tylenol for pain fever  Follow up with PCP in 3-5 days.  Proceed to  ER if symptoms worsen, including, but not limited to, oxygen below 91%.     Eat yogurt with live and active cultures and/or take a probiotic at least 3 hours before or after antibiotic dose. Monitor stool for diarrhea and/or blood. If this occurs, contact primary care doctor ASAP.

## 2024-01-02 NOTE — PROGRESS NOTES
[facility]        NAME: Rose Marie Gonzalez is a 83 y.o. female  : 1940    MRN: [id number]  DATE: 2024  TIME: 4:09 PM    Assessment and Plan   Shortness of breath [R06.02]  1. Shortness of breath  XR chest pa & lateral    benzonatate (TESSALON PERLES) 100 mg capsule    albuterol (ProAir HFA) 90 mcg/act inhaler      2. Acute cough  Poct Covid 19 Rapid Antigen Test      3. Acute bacterial bronchitis  amoxicillin-clavulanate (AUGMENTIN) 875-125 mg per tablet        CXR provider read: lung hyperinflation consistent with obstructive airway disease. No other acute cardiopulmonary disease.     Reports baseline SOB without significant worsening. She is f/u with specialists.       Patient Instructions     Medications as prescribed  Take over the counter Mucinex during the day  Fluids and rest (Warm water with honey and lemon)  Tylenol for pain fever  Follow up with PCP in 3-5 days.  Proceed to  ER if symptoms worsen, including, but not limited to, oxygen below 91%.     Chief Complaint     Chief Complaint   Patient presents with    Cough     Head congestion and cough for 2 days; feels like she has chest congestion and a rattle with her cough; difficulty breathing.   Taking extra strength tylenol with no relief          History of Present Illness       Patient has O2 to use at night PRN. Reports constant SOB over the past few months. Denies new worsening SOB. She is following up with cardiology and PCP.     Cough  This is a new problem. Episode onset: 2d. The cough is Productive of sputum. Associated symptoms include headaches and shortness of breath (baseline). Pertinent negatives include no chills, ear pain, fever, myalgias, postnasal drip, rash, rhinorrhea, sore throat or wheezing. Treatments tried: tylenol; losenges. There is no history of COPD.       Review of Systems   Review of Systems   Constitutional:  Negative for chills, fatigue and fever.   HENT:  Positive for congestion. Negative for ear  discharge, ear pain, postnasal drip, rhinorrhea, sinus pressure, sinus pain, sneezing, sore throat and trouble swallowing.    Respiratory:  Positive for cough and shortness of breath (baseline). Negative for chest tightness and wheezing.    Gastrointestinal:  Negative for abdominal pain, diarrhea, nausea and vomiting.   Musculoskeletal:  Negative for myalgias.   Skin:  Negative for rash.   Neurological:  Positive for headaches. Negative for light-headedness.         Current Medications       Current Outpatient Medications:     acetaminophen (TYLENOL) 325 mg tablet, Take 650 mg by mouth every 6 (six) hours as needed for mild pain , Disp: , Rfl:     albuterol (ProAir HFA) 90 mcg/act inhaler, Inhale 2 puffs every 6 (six) hours as needed for wheezing or shortness of breath, Disp: 8.5 g, Rfl: 0    amoxicillin-clavulanate (AUGMENTIN) 875-125 mg per tablet, Take 1 tablet by mouth every 12 (twelve) hours for 7 days, Disp: 14 tablet, Rfl: 0    atorvastatin (LIPITOR) 40 mg tablet, Take 40 mg by mouth daily, Disp: , Rfl:     benzonatate (TESSALON PERLES) 100 mg capsule, Take 1 capsule (100 mg total) by mouth 3 (three) times a day as needed for cough, Disp: 20 capsule, Rfl: 0    benzonatate (TESSALON PERLES) 100 mg capsule, Take 1 capsule (100 mg total) by mouth 3 (three) times a day as needed for cough, Disp: 20 capsule, Rfl: 0    calcium carbonate (TUMS) 500 mg chewable tablet, Chew 1 tablet as needed for indigestion or heartburn, Disp: , Rfl:     Cholecalciferol (Vitamin D) 50 MCG (2000 UT) tablet, Take 2,000 Units by mouth daily, Disp: , Rfl:     ferrous sulfate 325 (65 Fe) mg tablet, Take 1 tablet (325 mg total) by mouth daily with breakfast, Disp: 30 tablet, Rfl: 0    furosemide (LASIX) 20 mg tablet, Take 20 mg by mouth 2 (two) times a day, Disp: , Rfl:     Latanoprost 0.005 % EMUL, Apply to eye daily at bedtime Pt uses in right eye only, Disp: , Rfl:     lisinopril (ZESTRIL) 5 mg tablet, Take 5 mg by mouth daily, Disp: ,  Rfl:     metoprolol succinate (TOPROL-XL) 50 mg 24 hr tablet, Take 50 mg by mouth daily, Disp: , Rfl:     omeprazole (PriLOSEC) 10 mg delayed release capsule, Take 10 mg by mouth daily, Disp: , Rfl:     pantoprazole (PROTONIX) 40 mg tablet, Take 1 tablet (40 mg total) by mouth 2 (two) times a day before meals (Patient taking differently: Take 20 mg by mouth 2 (two) times a day before meals), Disp: 60 tablet, Rfl: 0    warfarin (COUMADIN) 4 mg tablet, Take 1 tablet (4 mg total) by mouth daily (Patient taking differently: Take 4 mg by mouth daily), Disp: 20 tablet, Rfl: 0    ascorbic acid (VITAMIN C) 500 MG tablet, Take 1 tablet (500 mg total) by mouth daily (Patient not taking: Reported on 10/5/2023), Disp: 30 tablet, Rfl: 0    loperamide (Imodium A-D) 2 mg capsule, Take 2 mg by mouth 4 (four) times a day as needed for diarrhea (Patient not taking: Reported on 9/29/2021), Disp: , Rfl:     simvastatin (ZOCOR) 10 mg tablet, Take 10 mg by mouth daily at bedtime (Patient not taking: Reported on 10/5/2023), Disp: , Rfl:     Current Allergies     Allergies as of 01/02/2024 - Reviewed 01/02/2024   Allergen Reaction Noted    Nsaids GI Intolerance and Abdominal Pain 12/08/2013    Cephalexin Other (See Comments) 07/31/2021    Nabumetone Other (See Comments) 07/31/2021            The following portions of the patient's history were reviewed and updated as appropriate: allergies, current medications, past family history, past medical history, past social history, past surgical history and problem list.     Past Medical History:   Diagnosis Date    Anemia     Atrial fibrillation (HCC)     Bradycardia     Colon polyp     GERD (gastroesophageal reflux disease)     History of pulmonary embolus (PE)     History of transfusion     Hypertension     Long term (current) use of anticoagulants     Osteoarthritis     Radiculopathy     Shortness of breath 09/29/2021    Pt has been reporting increased episodes of SOB- lying down and with mod  "exertion. Pt states it has been keeping her up at night.    Vitamin D deficiency        Past Surgical History:   Procedure Laterality Date    APPENDECTOMY      BACK SURGERY      COLONOSCOPY      EGD      EYE SURGERY      JOINT REPLACEMENT         History reviewed. No pertinent family history.      Medications have been verified.        Objective   /70   Pulse 105   Temp 97.9 °F (36.6 °C)   Resp (!) 30   Ht 5' 4\" (1.626 m)   Wt 97.5 kg (215 lb)   SpO2 95%   BMI 36.90 kg/m²   No LMP recorded. Patient is postmenopausal.       Physical Exam     Physical Exam  Vitals reviewed.   Constitutional:       General: She is not in acute distress.     Appearance: She is well-developed. She is not diaphoretic.   HENT:      Head: Normocephalic.      Right Ear: Tympanic membrane, ear canal and external ear normal.      Left Ear: Tympanic membrane, ear canal and external ear normal.      Nose: Nose normal.      Mouth/Throat:      Pharynx: No oropharyngeal exudate or posterior oropharyngeal erythema.   Eyes:      Conjunctiva/sclera: Conjunctivae normal.   Cardiovascular:      Rate and Rhythm: Normal rate and regular rhythm.      Heart sounds: Normal heart sounds. No murmur heard.     No friction rub. No gallop.   Pulmonary:      Effort: Pulmonary effort is normal. No respiratory distress.      Breath sounds: Wheezing and rhonchi present. No rales.   Lymphadenopathy:      Cervical: No cervical adenopathy.   Skin:     General: Skin is warm.   Neurological:      Mental Status: She is alert and oriented to person, place, and time.   Psychiatric:         Behavior: Behavior normal.         Thought Content: Thought content normal.         Judgment: Judgment normal.                   "

## 2024-01-04 ENCOUNTER — TELEPHONE (OUTPATIENT)
Dept: URGENT CARE | Facility: CLINIC | Age: 84
End: 2024-01-04

## 2024-01-04 DIAGNOSIS — J20.9 ACUTE BRONCHITIS, UNSPECIFIED ORGANISM: Primary | ICD-10-CM

## 2024-01-04 RX ORDER — AMOXICILLIN 500 MG/1
500 CAPSULE ORAL EVERY 12 HOURS SCHEDULED
Qty: 14 CAPSULE | Refills: 0 | Status: SHIPPED | OUTPATIENT
Start: 2024-01-04 | End: 2024-01-11

## 2024-01-04 NOTE — TELEPHONE ENCOUNTER
Patient called that she has been taking Augmentin for 2 days which has been causing her dizziness and diarrhea.  She stated she has taken amoxicillin in the past without difficulties and asked if it could be switched to that.  Confirmed pharmacy and sent in amoxicillin.  She did not have any other questions or concerns at this time.

## 2024-04-07 ENCOUNTER — HOSPITAL ENCOUNTER (EMERGENCY)
Facility: HOSPITAL | Age: 84
Discharge: HOME | End: 2024-04-07
Attending: EMERGENCY MEDICINE
Payer: MEDICARE

## 2024-04-07 ENCOUNTER — APPOINTMENT (EMERGENCY)
Dept: RADIOLOGY | Facility: HOSPITAL | Age: 84
End: 2024-04-07
Payer: MEDICARE

## 2024-04-07 ENCOUNTER — OFFICE VISIT (OUTPATIENT)
Dept: URGENT CARE | Facility: CLINIC | Age: 84
End: 2024-04-07
Payer: MEDICARE

## 2024-04-07 ENCOUNTER — APPOINTMENT (EMERGENCY)
Dept: NON INVASIVE DIAGNOSTICS | Facility: HOSPITAL | Age: 84
End: 2024-04-07
Payer: MEDICARE

## 2024-04-07 VITALS
TEMPERATURE: 97.7 F | RESPIRATION RATE: 20 BRPM | DIASTOLIC BLOOD PRESSURE: 81 MMHG | HEART RATE: 99 BPM | OXYGEN SATURATION: 96 % | SYSTOLIC BLOOD PRESSURE: 122 MMHG

## 2024-04-07 VITALS
OXYGEN SATURATION: 96 % | HEART RATE: 111 BPM | SYSTOLIC BLOOD PRESSURE: 138 MMHG | WEIGHT: 200 LBS | BODY MASS INDEX: 35.44 KG/M2 | RESPIRATION RATE: 16 BRPM | HEIGHT: 63 IN | TEMPERATURE: 97 F | DIASTOLIC BLOOD PRESSURE: 80 MMHG

## 2024-04-07 DIAGNOSIS — L03.116 CELLULITIS OF LEFT LOWER EXTREMITY: Primary | ICD-10-CM

## 2024-04-07 DIAGNOSIS — M25.472 LEFT ANKLE SWELLING: Primary | ICD-10-CM

## 2024-04-07 LAB
ALBUMIN SERPL BCP-MCNC: 4 G/DL (ref 3.5–5)
ALP SERPL-CCNC: 68 U/L (ref 34–104)
ALT SERPL W P-5'-P-CCNC: 11 U/L (ref 7–52)
ANION GAP SERPL CALCULATED.3IONS-SCNC: 8 MMOL/L (ref 4–13)
APTT PPP: 38 SECONDS (ref 23–37)
AST SERPL W P-5'-P-CCNC: 17 U/L (ref 13–39)
BASOPHILS # BLD AUTO: 0.03 THOUSANDS/ÂΜL (ref 0–0.1)
BASOPHILS NFR BLD AUTO: 0 % (ref 0–1)
BILIRUB SERPL-MCNC: 0.77 MG/DL (ref 0.2–1)
BUN SERPL-MCNC: 17 MG/DL (ref 5–25)
CALCIUM SERPL-MCNC: 9.4 MG/DL (ref 8.4–10.2)
CHLORIDE SERPL-SCNC: 105 MMOL/L (ref 96–108)
CK SERPL-CCNC: 54 U/L (ref 26–192)
CO2 SERPL-SCNC: 27 MMOL/L (ref 21–32)
CREAT SERPL-MCNC: 0.93 MG/DL (ref 0.6–1.3)
EOSINOPHIL # BLD AUTO: 0.11 THOUSAND/ÂΜL (ref 0–0.61)
EOSINOPHIL NFR BLD AUTO: 1 % (ref 0–6)
ERYTHROCYTE [DISTWIDTH] IN BLOOD BY AUTOMATED COUNT: 13.8 % (ref 11.6–15.1)
GFR SERPL CREATININE-BSD FRML MDRD: 56 ML/MIN/1.73SQ M
GLUCOSE SERPL-MCNC: 97 MG/DL (ref 65–140)
HCT VFR BLD AUTO: 36.6 % (ref 34.8–46.1)
HGB BLD-MCNC: 11.1 G/DL (ref 11.5–15.4)
IMM GRANULOCYTES # BLD AUTO: 0.03 THOUSAND/UL (ref 0–0.2)
IMM GRANULOCYTES NFR BLD AUTO: 0 % (ref 0–2)
INR PPP: 3.11 (ref 0.84–1.19)
LYMPHOCYTES # BLD AUTO: 1.98 THOUSANDS/ÂΜL (ref 0.6–4.47)
LYMPHOCYTES NFR BLD AUTO: 25 % (ref 14–44)
MCH RBC QN AUTO: 26.6 PG (ref 26.8–34.3)
MCHC RBC AUTO-ENTMCNC: 30.3 G/DL (ref 31.4–37.4)
MCV RBC AUTO: 88 FL (ref 82–98)
MONOCYTES # BLD AUTO: 1.01 THOUSAND/ÂΜL (ref 0.17–1.22)
MONOCYTES NFR BLD AUTO: 13 % (ref 4–12)
NEUTROPHILS # BLD AUTO: 4.75 THOUSANDS/ÂΜL (ref 1.85–7.62)
NEUTS SEG NFR BLD AUTO: 61 % (ref 43–75)
NRBC BLD AUTO-RTO: 0 /100 WBCS
PLATELET # BLD AUTO: 242 THOUSANDS/UL (ref 149–390)
PMV BLD AUTO: 10.4 FL (ref 8.9–12.7)
POTASSIUM SERPL-SCNC: 4.3 MMOL/L (ref 3.5–5.3)
PROT SERPL-MCNC: 7.3 G/DL (ref 6.4–8.4)
PROTHROMBIN TIME: 32.3 SECONDS (ref 11.6–14.5)
RBC # BLD AUTO: 4.17 MILLION/UL (ref 3.81–5.12)
SODIUM SERPL-SCNC: 140 MMOL/L (ref 135–147)
WBC # BLD AUTO: 7.91 THOUSAND/UL (ref 4.31–10.16)

## 2024-04-07 PROCEDURE — 36415 COLL VENOUS BLD VENIPUNCTURE: CPT | Performed by: EMERGENCY MEDICINE

## 2024-04-07 PROCEDURE — 93971 EXTREMITY STUDY: CPT | Performed by: SURGERY

## 2024-04-07 PROCEDURE — G0463 HOSPITAL OUTPT CLINIC VISIT: HCPCS

## 2024-04-07 PROCEDURE — 85610 PROTHROMBIN TIME: CPT | Performed by: EMERGENCY MEDICINE

## 2024-04-07 PROCEDURE — 99213 OFFICE O/P EST LOW 20 MIN: CPT

## 2024-04-07 PROCEDURE — 82550 ASSAY OF CK (CPK): CPT | Performed by: EMERGENCY MEDICINE

## 2024-04-07 PROCEDURE — 93971 EXTREMITY STUDY: CPT

## 2024-04-07 PROCEDURE — 80053 COMPREHEN METABOLIC PANEL: CPT | Performed by: EMERGENCY MEDICINE

## 2024-04-07 PROCEDURE — 85025 COMPLETE CBC W/AUTO DIFF WBC: CPT | Performed by: EMERGENCY MEDICINE

## 2024-04-07 PROCEDURE — 86618 LYME DISEASE ANTIBODY: CPT | Performed by: EMERGENCY MEDICINE

## 2024-04-07 PROCEDURE — 73610 X-RAY EXAM OF ANKLE: CPT

## 2024-04-07 PROCEDURE — 99284 EMERGENCY DEPT VISIT MOD MDM: CPT | Performed by: EMERGENCY MEDICINE

## 2024-04-07 PROCEDURE — 85730 THROMBOPLASTIN TIME PARTIAL: CPT | Performed by: EMERGENCY MEDICINE

## 2024-04-07 RX ORDER — AMOXICILLIN AND CLAVULANATE POTASSIUM 875; 125 MG/1; MG/1
1 TABLET, FILM COATED ORAL ONCE
Status: DISCONTINUED | OUTPATIENT
Start: 2024-04-07 | End: 2024-04-07

## 2024-04-07 RX ORDER — AMOXICILLIN AND CLAVULANATE POTASSIUM 875; 125 MG/1; MG/1
1 TABLET, FILM COATED ORAL EVERY 12 HOURS
Qty: 14 TABLET | Refills: 0 | Status: SHIPPED | OUTPATIENT
Start: 2024-04-07 | End: 2024-04-07 | Stop reason: CLARIF

## 2024-04-07 RX ORDER — AMOXICILLIN 500 MG/1
500 CAPSULE ORAL 3 TIMES DAILY
Qty: 21 CAPSULE | Refills: 0 | Status: SHIPPED | OUTPATIENT
Start: 2024-04-07 | End: 2024-04-14

## 2024-04-07 RX ORDER — AMOXICILLIN 250 MG/1
500 CAPSULE ORAL ONCE
Status: COMPLETED | OUTPATIENT
Start: 2024-04-07 | End: 2024-04-07

## 2024-04-07 RX ADMIN — AMOXICILLIN 500 MG: 250 CAPSULE ORAL at 13:48

## 2024-04-07 NOTE — ED PROVIDER NOTES
History  Chief Complaint   Patient presents with    Ankle Swelling     Pt presents to ED for left ankle swelling. Pt denies pain unless area is palpated.      Patient with left lateral ankle pain and swelling for the past 2 days.  No known trauma.  History of DVT.  Is on Coumadin.  No change in her Coumadin regimen.  Has been compliant with her medications.  No chest pain or shortness of breath.  No fevers or chills.  Seen in urgent care and sent here for further evaluation for possible DVT.      History provided by:  Patient   used: No    Ankle Swelling  Location:  Ankle  Time since incident:  2 days  Injury: no    Ankle location:  L ankle  Pain details:     Quality:  Aching    Radiates to:  Does not radiate    Severity:  Mild    Onset quality:  Gradual    Duration:  2 days    Timing:  Constant    Progression:  Worsening  Chronicity:  New  Dislocation: no    Relieved by:  Nothing  Worsened by:  Bearing weight (Palpation)  Ineffective treatments:  None tried  Associated symptoms: no back pain, no decreased ROM, no fever, no neck pain and no swelling        Prior to Admission Medications   Prescriptions Last Dose Informant Patient Reported? Taking?   Cholecalciferol (Vitamin D) 50 MCG (2000 UT) tablet   Yes No   Sig: Take 2,000 Units by mouth daily   Latanoprost 0.005 % EMUL   Yes No   Sig: Apply to eye daily at bedtime Pt uses in right eye only   acetaminophen (TYLENOL) 325 mg tablet  Self Yes No   Sig: Take 650 mg by mouth every 6 (six) hours as needed for mild pain    albuterol (ProAir HFA) 90 mcg/act inhaler   No No   Sig: Inhale 2 puffs every 6 (six) hours as needed for wheezing or shortness of breath   atorvastatin (LIPITOR) 40 mg tablet   Yes No   Sig: Take 40 mg by mouth daily   calcium carbonate (TUMS) 500 mg chewable tablet   Yes No   Sig: Chew 1 tablet as needed for indigestion or heartburn   ferrous sulfate 325 (65 Fe) mg tablet   No No   Sig: Take 1 tablet (325 mg total) by mouth  daily with breakfast   furosemide (LASIX) 20 mg tablet   Yes No   Sig: Take 20 mg by mouth 2 (two) times a day   lisinopril (ZESTRIL) 5 mg tablet   Yes No   Sig: Take 5 mg by mouth daily   loperamide (Imodium A-D) 2 mg capsule   Yes No   Sig: Take 2 mg by mouth 4 (four) times a day as needed for diarrhea   Patient not taking: Reported on 9/29/2021   metoprolol succinate (TOPROL-XL) 50 mg 24 hr tablet   Yes No   Sig: Take 50 mg by mouth daily   omeprazole (PriLOSEC) 10 mg delayed release capsule   Yes No   Sig: Take 10 mg by mouth daily   pantoprazole (PROTONIX) 40 mg tablet   No No   Sig: Take 1 tablet (40 mg total) by mouth 2 (two) times a day before meals   Patient taking differently: Take 20 mg by mouth 2 (two) times a day before meals   simvastatin (ZOCOR) 10 mg tablet   Yes No   Sig: Take 10 mg by mouth daily at bedtime   warfarin (COUMADIN) 4 mg tablet   No No   Sig: Take 1 tablet (4 mg total) by mouth daily   Patient taking differently: Take 4 mg by mouth daily      Facility-Administered Medications: None       Past Medical History:   Diagnosis Date    Anemia     Atrial fibrillation (HCC)     Bradycardia     Colon polyp     GERD (gastroesophageal reflux disease)     History of pulmonary embolus (PE)     History of transfusion     Hypertension     Long term (current) use of anticoagulants     Osteoarthritis     Radiculopathy     Shortness of breath 09/29/2021    Pt has been reporting increased episodes of SOB- lying down and with mod exertion. Pt states it has been keeping her up at night.    Vitamin D deficiency        Past Surgical History:   Procedure Laterality Date    APPENDECTOMY      BACK SURGERY      COLONOSCOPY      EGD      EYE SURGERY      JOINT REPLACEMENT         Family History   Problem Relation Age of Onset    Cancer Father      I have reviewed and agree with the history as documented.    E-Cigarette/Vaping    E-Cigarette Use Never User      E-Cigarette/Vaping Substances    Nicotine No     THC  No     CBD No     Flavoring No     Other No     Unknown No      Social History     Tobacco Use    Smoking status: Never    Smokeless tobacco: Never   Vaping Use    Vaping status: Never Used   Substance Use Topics    Alcohol use: Not Currently     Alcohol/week: 0.0 standard drinks of alcohol     Comment: 0    Drug use: Never       Review of Systems   Constitutional:  Negative for chills and fever.   HENT:  Negative for ear pain, hearing loss, sore throat, trouble swallowing and voice change.    Eyes:  Negative for pain and discharge.   Respiratory:  Negative for cough, shortness of breath and wheezing.    Cardiovascular:  Negative for chest pain and palpitations.   Gastrointestinal:  Negative for abdominal pain, blood in stool, constipation, diarrhea, nausea and vomiting.   Genitourinary:  Negative for dysuria, flank pain, frequency and hematuria.   Musculoskeletal:  Positive for arthralgias. Negative for back pain, joint swelling, neck pain and neck stiffness.   Skin:  Negative for rash and wound.   Neurological:  Negative for dizziness, seizures, syncope, facial asymmetry and headaches.   Psychiatric/Behavioral:  Negative for hallucinations, self-injury and suicidal ideas.    All other systems reviewed and are negative.      Physical Exam  Physical Exam  Musculoskeletal:         General: Swelling and tenderness present.      Comments: Mild swelling and tenderness with erythema and minimal warmth to the lateral aspect of the left ankle.  Full range of motion of the ankle.  Dorsalis pedis pulses 2+.         Vital Signs  ED Triage Vitals [04/07/24 1222]   Temperature Pulse Respirations Blood Pressure SpO2   97.7 °F (36.5 °C) (!) 109 18 (!) 173/93 96 %      Temp Source Heart Rate Source Patient Position - Orthostatic VS BP Location FiO2 (%)   Temporal Monitor Sitting Right arm --      Pain Score       --           Vitals:    04/07/24 1222 04/07/24 1300   BP: (!) 173/93 122/81   Pulse: (!) 109 99   Patient Position -  Orthostatic VS: Sitting          Visual Acuity      ED Medications  Medications   amoxicillin (AMOXIL) capsule 500 mg (has no administration in time range)       Diagnostic Studies  Results Reviewed       Procedure Component Value Units Date/Time    Comprehensive metabolic panel [[id number]] Collected: 04/07/24 1228    Lab Status: Final result Specimen: Blood from Arm, Left Updated: 04/07/24 1255     Sodium 140 mmol/L      Potassium 4.3 mmol/L      Chloride 105 mmol/L      CO2 27 mmol/L      ANION GAP 8 mmol/L      BUN 17 mg/dL      Creatinine 0.93 mg/dL      Glucose 97 mg/dL      Calcium 9.4 mg/dL      AST 17 U/L      ALT 11 U/L      Alkaline Phosphatase 68 U/L      Total Protein 7.3 g/dL      Albumin 4.0 g/dL      Total Bilirubin 0.77 mg/dL      eGFR 56 ml/min/1.73sq m     Narrative:      National Kidney Disease Foundation guidelines for Chronic Kidney Disease (CKD):     Stage 1 with normal or high GFR (GFR > 90 mL/min/1.73 square meters)    Stage 2 Mild CKD (GFR = 60-89 mL/min/1.73 square meters)    Stage 3A Moderate CKD (GFR = 45-59 mL/min/1.73 square meters)    Stage 3B Moderate CKD (GFR = 30-44 mL/min/1.73 square meters)    Stage 4 Severe CKD (GFR = 15-29 mL/min/1.73 square meters)    Stage 5 End Stage CKD (GFR <15 mL/min/1.73 square meters)  Note: GFR calculation is accurate only with a steady state creatinine    CK [[id number]]  (Normal) Collected: 04/07/24 1228    Lab Status: Final result Specimen: Blood from Arm, Left Updated: 04/07/24 1255     Total CK 54 U/L     Protime-INR [[id number]]  (Abnormal) Collected: 04/07/24 1228    Lab Status: Final result Specimen: Blood from Arm, Left Updated: 04/07/24 1254     Protime 32.3 seconds      INR 3.11    APTT [[id number]]  (Abnormal) Collected: 04/07/24 1228    Lab Status: Final result Specimen: Blood from Arm, Left Updated: 04/07/24 1254     PTT 38 seconds     CBC and differential [[id number]]  (Abnormal) Collected: 04/07/24 1228    Lab Status: Final result Specimen:  Blood from Arm, Left Updated: 04/07/24 1234     WBC 7.91 Thousand/uL      RBC 4.17 Million/uL      Hemoglobin 11.1 g/dL      Hematocrit 36.6 %      MCV 88 fL      MCH 26.6 pg      MCHC 30.3 g/dL      RDW 13.8 %      MPV 10.4 fL      Platelets 242 Thousands/uL      nRBC 0 /100 WBCs      Neutrophils Relative 61 %      Immature Grans % 0 %      Lymphocytes Relative 25 %      Monocytes Relative 13 %      Eosinophils Relative 1 %      Basophils Relative 0 %      Neutrophils Absolute 4.75 Thousands/µL      Absolute Immature Grans 0.03 Thousand/uL      Absolute Lymphocytes 1.98 Thousands/µL      Absolute Monocytes 1.01 Thousand/µL      Eosinophils Absolute 0.11 Thousand/µL      Basophils Absolute 0.03 Thousands/µL     Lyme Total AB W Reflex to IGM/IGG [[id number]] Collected: 04/07/24 1228    Lab Status: In process Specimen: Blood from Arm, Left Updated: 04/07/24 1232    Narrative:      The following orders were created for panel order Lyme Total AB W Reflex to IGM/IGG.  Procedure                               Abnormality         Status                     ---------                               -----------         ------                     Lyme Total AB W Reflex t...[[id number]]                      In process                   Please view results for these tests on the individual orders.    Lyme Total AB W Reflex to IGM/IGG [[id number]] Collected: 04/07/24 1228    Lab Status: In process Specimen: Blood from Arm, Left Updated: 04/07/24 1232                   XR ankle 3+ views LEFT   ED Interpretation by Miguel Stoddard MD (04/07 1242)   No acute fracture or dislocation.      VAS lower limb venous duplex study, unilateral/limited    (Results Pending)              Procedures  Procedures         ED Course  ED Course as of 04/07/24 1347   Sun Apr 07, 2024   1242 Patient with full range of motion of the ankle.  No pain with movement.  Doubt septic joint.   1337 No DVT per vascular technician.   1337 Area is slightly red and  warm.  Will treat for presumptive cellulitis.   1340 Son states she can take penicillin without any difficulty.   1345 Patient states she can take Amoxil without any difficulty.                               SBIRT 22yo+      Flowsheet Row Most Recent Value   Initial Alcohol Screen: US AUDIT-C     1. How often do you have a drink containing alcohol? 0 Filed at: 04/07/2024 1223   2. How many drinks containing alcohol do you have on a typical day you are drinking?  0 Filed at: 04/07/2024 1223   3b. FEMALE Any Age, or MALE 65+: How often do you have 4 or more drinks on one occassion? 0 Filed at: 04/07/2024 1223   Audit-C Score 0 Filed at: 04/07/2024 1223   ASHTYN: How many times in the past year have you...    Used an illegal drug or used a prescription medication for non-medical reasons? Never Filed at: 04/07/2024 1223                      Medical Decision Making  Amount and/or Complexity of Data Reviewed  Labs: ordered.  Radiology: ordered and independent interpretation performed.    Risk  Prescription drug management.             Disposition  Final diagnoses:   Cellulitis of left lower extremity     Time reflects when diagnosis was documented in both MDM as applicable and the Disposition within this note       Time User Action Codes Description Comment    4/7/2024  1:40 PM Miguel Stoddard Add [L03.116] Cellulitis of left lower extremity           ED Disposition       ED Disposition   Discharge    Condition   Stable    Date/Time   Sun Apr 7, 2024 1340    Comment   Rose Marie Gonzalez discharge to home/self care.                   Follow-up Information       Follow up With Specialties Details Why Contact Info    Eduard Muhammad,   Call in 1 day  [address] [facility] [address]  [phone]              Patient's Medications   Discharge Prescriptions    AMOXICILLIN (AMOXIL) 500 MG CAPSULE    Take 1 capsule (500 mg total) by mouth 3 (three) times a day for 7 days       Start Date: 4/7/2024  End Date:  4/14/2024       Order Dose: 500 mg       Quantity: 21 capsule    Refills: 0    MUPIROCIN (BACTROBAN) 2 % OINTMENT    Apply topically 3 (three) times a day       Start Date: 4/7/2024  End Date: --       Order Dose: --       Quantity: 15 g    Refills: 0       No discharge procedures on file.    PDMP Review       None            ED Provider  Electronically Signed by             Miguel Stoddard MD  04/07/24 2939       Miguel Stoddard MD  04/07/24 6797

## 2024-04-07 NOTE — PATIENT INSTRUCTIONS
Follow up with PCP in 3-5 days.  Proceed to ER for further evaluation and management of symptoms     If tests have been performed at [facility], our office will contact you with results if changes need to be made to the care plan discussed with you at the visit.  You can review your full results on [facility] MyChart.    Leg Edema   AMBULATORY CARE:   Leg edema  is swelling caused by fluid buildup. Your legs may swell if you sit or stand for long periods of time, are pregnant, or are injured. Swelling may also occur if you have heart failure or circulation problems. This means that your heart does not pump blood through your body as it should.       Call your local emergency number (911 in the [place]) for any of the following:   You cannot walk.    You have chest pain or trouble breathing that is worse when you lie down.    You suddenly feel lightheaded and have trouble breathing.    You have new and sudden chest pain. You may have more pain when you take deep breaths or cough.    You cough up blood.    Seek care immediately if:   You feel faint or confused.    Your skin turns blue or gray.    Your leg feels warm, tender, and painful. It may be swollen and red.    Call your doctor if:   You have a fever or feel more tired than usual.    The veins in your legs look larger than usual. They may look full or bulging.    Your legs itch or feel heavy.    You have red or white areas or sores on your legs. The skin may also appear dimpled or have indentations.    You are gaining weight.    You have trouble moving your ankles.    The swelling does not go away, or other parts of your body swell.    You have questions or concerns about your condition or care.    Self-care:   Elevate your legs.  Raise your legs above the level of your heart as often as you can. This will help decrease swelling and pain. Prop your legs on pillows or blankets to keep them elevated comfortably.         Wear pressure stockings, if directed.  These tight  stockings put pressure on your legs to promote blood flow and prevent blood clots. Put them on before you get out of bed. Wear the stockings during the day. Do not wear them while you sleep.         Stay active.  Do not stand or sit for long periods of time. Ask your healthcare provider about the best exercise plan for you.         Eat healthy foods.  Healthy foods include fruits, vegetables, whole-grain breads, low-fat dairy products, beans, lean meats, and fish. Ask if you need to be on a special diet.         Limit sodium (salt).  Salt will make your body hold even more fluid. Your healthcare provider will tell you how many milligrams (mg) of salt you can have each day.       Follow up with your doctor as directed:  Write down your questions so you remember to ask them during your visits.  © Copyright [employer] 2023 Information is for End User's use only and may not be sold, redistributed or otherwise used for commercial purposes.  The above information is an  only. It is not intended as medical advice for individual conditions or treatments. Talk to your doctor, nurse or pharmacist before following any medical regimen to see if it is safe and effective for you.

## 2024-04-07 NOTE — DISCHARGE INSTRUCTIONS
Your INR today is slightly elevated at 3.11.  Please contact your doctor to see if any adjustments need to be made.

## 2024-04-07 NOTE — PROGRESS NOTES
[facility]        NAME: Rose Marie Gonzalez is a 83 y.o. female  : 1940    MRN: [id number]  DATE: 2024  TIME: 11:47 AM    Assessment and Plan   Left ankle swelling [M25.472]  1. Left ankle swelling  Transfer to other facility        Symptoms could be related to mild ankle sprain due to recent activity increase however with history of DVT and erythematous skin changes, requires higher level of care for further evaluation and management of symptoms. Recommend she proceed to ED, patient and son agreeable. Complete assessment deferred to ED.     Patient Instructions     Patient Instructions   Follow up with PCP in 3-5 days.  Proceed to ER for further evaluation and management of symptoms     If tests have been performed at [facility], our office will contact you with results if changes need to be made to the care plan discussed with you at the visit.  You can review your full results on [facility] MyChart.    Leg Edema   AMBULATORY CARE:   Leg edema  is swelling caused by fluid buildup. Your legs may swell if you sit or stand for long periods of time, are pregnant, or are injured. Swelling may also occur if you have heart failure or circulation problems. This means that your heart does not pump blood through your body as it should.       Call your local emergency number (911 in the [place]) for any of the following:   You cannot walk.    You have chest pain or trouble breathing that is worse when you lie down.    You suddenly feel lightheaded and have trouble breathing.    You have new and sudden chest pain. You may have more pain when you take deep breaths or cough.    You cough up blood.    Seek care immediately if:   You feel faint or confused.    Your skin turns blue or gray.    Your leg feels warm, tender, and painful. It may be swollen and red.    Call your doctor if:   You have a fever or feel more tired than usual.    The veins in your legs look larger than usual. They may look full or  bulging.    Your legs itch or feel heavy.    You have red or white areas or sores on your legs. The skin may also appear dimpled or have indentations.    You are gaining weight.    You have trouble moving your ankles.    The swelling does not go away, or other parts of your body swell.    You have questions or concerns about your condition or care.    Self-care:   Elevate your legs.  Raise your legs above the level of your heart as often as you can. This will help decrease swelling and pain. Prop your legs on pillows or blankets to keep them elevated comfortably.         Wear pressure stockings, if directed.  These tight stockings put pressure on your legs to promote blood flow and prevent blood clots. Put them on before you get out of bed. Wear the stockings during the day. Do not wear them while you sleep.         Stay active.  Do not stand or sit for long periods of time. Ask your healthcare provider about the best exercise plan for you.         Eat healthy foods.  Healthy foods include fruits, vegetables, whole-grain breads, low-fat dairy products, beans, lean meats, and fish. Ask if you need to be on a special diet.         Limit sodium (salt).  Salt will make your body hold even more fluid. Your healthcare provider will tell you how many milligrams (mg) of salt you can have each day.       Follow up with your doctor as directed:  Write down your questions so you remember to ask them during your visits.  © Copyright [employer] 2023 Information is for End User's use only and may not be sold, redistributed or otherwise used for commercial purposes.  The above information is an  only. It is not intended as medical advice for individual conditions or treatments. Talk to your doctor, nurse or pharmacist before following any medical regimen to see if it is safe and effective for you.          Chief Complaint     Chief Complaint   Patient presents with    Joint Swelling     Swelling on lateral left ankle  with pain upon palpation. No injury. Worried about a bloodclot         History of Present Illness       83-year-old female with a past medical history significant for hypertension and A-fib on Coumadin presents with son for complaints of left lateral ankle pain and swelling x 4 days.  Patient denies specific known injury or trauma however reports that she has been out of the house 5 times since Easter, which is increased activity.  She reports some redness and swelling and is concerned for DVT as pain feels similar.  She reports therapeutic range for INR last week of 2.4 however previously increased INR and so was advised to skip dose of Coumadin.  Denies chest pain or shortness of breath. No recent long car rides or travel. She presents to  today to r/o DVT.         Review of Systems   Review of Systems   Constitutional:  Negative for chills and fever.   Respiratory:  Negative for cough, shortness of breath and wheezing.    Cardiovascular:  Positive for leg swelling. Negative for chest pain.   Gastrointestinal:  Negative for abdominal pain, diarrhea, nausea and vomiting.   Musculoskeletal:  Positive for arthralgias and joint swelling.   Skin:  Positive for color change. Negative for rash.   Neurological:  Negative for weakness and numbness.         Current Medications       Current Outpatient Medications:     acetaminophen (TYLENOL) 325 mg tablet, Take 650 mg by mouth every 6 (six) hours as needed for mild pain , Disp: , Rfl:     albuterol (ProAir HFA) 90 mcg/act inhaler, Inhale 2 puffs every 6 (six) hours as needed for wheezing or shortness of breath, Disp: 8.5 g, Rfl: 0    atorvastatin (LIPITOR) 40 mg tablet, Take 40 mg by mouth daily, Disp: , Rfl:     calcium carbonate (TUMS) 500 mg chewable tablet, Chew 1 tablet as needed for indigestion or heartburn, Disp: , Rfl:     Cholecalciferol (Vitamin D) 50 MCG (2000 UT) tablet, Take 2,000 Units by mouth daily, Disp: , Rfl:     ferrous sulfate 325 (65 Fe) mg tablet,  Take 1 tablet (325 mg total) by mouth daily with breakfast, Disp: 30 tablet, Rfl: 0    furosemide (LASIX) 20 mg tablet, Take 20 mg by mouth 2 (two) times a day, Disp: , Rfl:     Latanoprost 0.005 % EMUL, Apply to eye daily at bedtime Pt uses in right eye only, Disp: , Rfl:     lisinopril (ZESTRIL) 5 mg tablet, Take 5 mg by mouth daily, Disp: , Rfl:     metoprolol succinate (TOPROL-XL) 50 mg 24 hr tablet, Take 50 mg by mouth daily, Disp: , Rfl:     omeprazole (PriLOSEC) 10 mg delayed release capsule, Take 10 mg by mouth daily, Disp: , Rfl:     pantoprazole (PROTONIX) 40 mg tablet, Take 1 tablet (40 mg total) by mouth 2 (two) times a day before meals (Patient taking differently: Take 20 mg by mouth 2 (two) times a day before meals), Disp: 60 tablet, Rfl: 0    simvastatin (ZOCOR) 10 mg tablet, Take 10 mg by mouth daily at bedtime, Disp: , Rfl:     warfarin (COUMADIN) 4 mg tablet, Take 1 tablet (4 mg total) by mouth daily (Patient taking differently: Take 4 mg by mouth daily), Disp: 20 tablet, Rfl: 0    loperamide (Imodium A-D) 2 mg capsule, Take 2 mg by mouth 4 (four) times a day as needed for diarrhea (Patient not taking: Reported on 9/29/2021), Disp: , Rfl:     Current Allergies     Allergies as of 04/07/2024 - Reviewed 04/07/2024   Allergen Reaction Noted    Nsaids GI Intolerance and Abdominal Pain 12/08/2013    Cephalexin Other (See Comments) 07/31/2021    Nabumetone Other (See Comments) 07/31/2021            The following portions of the patient's history were reviewed and updated as appropriate: allergies, current medications, past family history, past medical history, past social history, past surgical history and problem list.     Past Medical History:   Diagnosis Date    Anemia     Atrial fibrillation (HCC)     Bradycardia     Colon polyp     GERD (gastroesophageal reflux disease)     History of pulmonary embolus (PE)     History of transfusion     Hypertension     Long term (current) use of anticoagulants      "Osteoarthritis     Radiculopathy     Shortness of breath 09/29/2021    Pt has been reporting increased episodes of SOB- lying down and with mod exertion. Pt states it has been keeping her up at night.    Vitamin D deficiency        Past Surgical History:   Procedure Laterality Date    APPENDECTOMY      BACK SURGERY      COLONOSCOPY      EGD      EYE SURGERY      JOINT REPLACEMENT         Family History   Problem Relation Age of Onset    Cancer Father          Medications have been verified.        Objective   /80   Pulse (!) 111   Temp (!) 97 °F (36.1 °C)   Resp 16   Ht 5' 3\" (1.6 m)   Wt 90.7 kg (200 lb)   SpO2 96%   BMI 35.43 kg/m²   No LMP recorded. Patient is postmenopausal.       Physical Exam     Physical Exam  Vitals and nursing note reviewed.   Constitutional:       General: She is not in acute distress.     Appearance: She is not toxic-appearing.   Cardiovascular:      Rate and Rhythm: Normal rate. Rhythm irregular.      Heart sounds: Normal heart sounds.   Pulmonary:      Effort: Pulmonary effort is normal. No respiratory distress.      Breath sounds: Normal breath sounds. No stridor. No wheezing, rhonchi or rales.   Musculoskeletal:         General: Swelling and tenderness present.      Left lower leg: Normal.      Left ankle: Swelling present. Tenderness present. Normal range of motion.      Comments: TTP over lateral aspect of left ankle below lateral malleolus with erythema and swelling. No open wounds. Not warm to touch.    Skin:     General: Skin is warm and dry.   Neurological:      Mental Status: She is alert and oriented to person, place, and time.      Sensory: Sensation is intact.      Motor: Motor function is intact.      Gait: Gait is intact.   Psychiatric:         Mood and Affect: Mood normal.         Behavior: Behavior normal.                   "

## 2024-04-08 LAB — B BURGDOR IGG+IGM SER QL IA: NEGATIVE

## 2024-08-14 ENCOUNTER — HOSPITAL ENCOUNTER (OUTPATIENT)
Dept: RADIOLOGY | Facility: HOSPITAL | Age: 84
Discharge: HOME | End: 2024-08-14
Payer: MEDICARE

## 2024-08-14 DIAGNOSIS — R09.89 ABNORMAL LUNG SOUNDS: ICD-10-CM

## 2024-08-14 DIAGNOSIS — I10 HTN, GOAL BELOW 140/90: ICD-10-CM

## 2024-08-14 DIAGNOSIS — I48.20 CHRONIC ATRIAL FIBRILLATION (HCC): ICD-10-CM

## 2024-08-14 DIAGNOSIS — I35.0 MODERATE AORTIC STENOSIS: ICD-10-CM

## 2024-08-14 DIAGNOSIS — R42 LIGHTHEADEDNESS: ICD-10-CM

## 2024-08-14 DIAGNOSIS — Z86.718 HISTORY OF DVT (DEEP VEIN THROMBOSIS): ICD-10-CM

## 2024-08-14 DIAGNOSIS — E66.01 SEVERE OBESITY WITH BODY MASS INDEX (BMI) OF 35.0 TO 39.9 WITH SERIOUS COMORBIDITY (HCC): ICD-10-CM

## 2024-08-14 PROCEDURE — 71046 X-RAY EXAM CHEST 2 VIEWS: CPT

## 2024-09-04 ENCOUNTER — HOSPITAL ENCOUNTER (OUTPATIENT)
Dept: NON INVASIVE DIAGNOSTICS | Facility: HOSPITAL | Age: 84
Discharge: HOME | End: 2024-09-04
Payer: MEDICARE

## 2024-09-04 VITALS
WEIGHT: 200 LBS | HEART RATE: 80 BPM | HEIGHT: 63 IN | BODY MASS INDEX: 35.44 KG/M2 | DIASTOLIC BLOOD PRESSURE: 81 MMHG | SYSTOLIC BLOOD PRESSURE: 122 MMHG

## 2024-09-04 DIAGNOSIS — E66.01 MORBID (SEVERE) OBESITY DUE TO EXCESS CALORIES (HCC): ICD-10-CM

## 2024-09-04 DIAGNOSIS — R42 DIZZINESS AND GIDDINESS: ICD-10-CM

## 2024-09-04 DIAGNOSIS — I35.0 NONRHEUMATIC AORTIC (VALVE) STENOSIS: ICD-10-CM

## 2024-09-04 DIAGNOSIS — Z86.718 PERSONAL HISTORY OF OTHER VENOUS THROMBOSIS AND EMBOLISM: ICD-10-CM

## 2024-09-04 DIAGNOSIS — I10 ESSENTIAL (PRIMARY) HYPERTENSION: ICD-10-CM

## 2024-09-04 DIAGNOSIS — R09.89 OTHER SPECIFIED SYMPTOMS AND SIGNS INVOLVING THE CIRCULATORY AND RESPIRATORY SYSTEMS: ICD-10-CM

## 2024-09-04 DIAGNOSIS — I48.20 CHRONIC ATRIAL FIBRILLATION, UNSPECIFIED (HCC): ICD-10-CM

## 2024-09-04 LAB
AORTIC ROOT: 2.7 CM
AORTIC VALVE MEAN VELOCITY: 31.6 M/S
APICAL FOUR CHAMBER EJECTION FRACTION: 48 %
ASCENDING AORTA: 3.7 CM
AV AREA BY CONTINUOUS VTI: 0.5 CM2
AV AREA PEAK VELOCITY: 0.4 CM2
AV LVOT MEAN GRADIENT: 1 MMHG
AV LVOT PEAK GRADIENT: 2 MMHG
AV MEAN GRADIENT: 43 MMHG
AV PEAK GRADIENT: 67 MMHG
AV VALVE AREA: 0.45 CM2
AV VELOCITY RATIO: 0.15
BSA FOR ECHO PROCEDURE: 1.93 M2
DOP CALC AO PEAK VEL: 4.08 M/S
DOP CALC AO VTI: 82.67 CM
DOP CALC LVOT AREA: 2.83 CM2
DOP CALC LVOT CARDIAC INDEX: 1.56 L/MIN/M2
DOP CALC LVOT CARDIAC OUTPUT: 3 L/MIN
DOP CALC LVOT DIAMETER: 1.9 CM
DOP CALC LVOT PEAK VEL VTI: 13.26 CM
DOP CALC LVOT PEAK VEL: 0.63 M/S
DOP CALC LVOT STROKE INDEX: 18.7 ML/M2
DOP CALC LVOT STROKE VOLUME: 37.58
FRACTIONAL SHORTENING: 34 (ref 28–44)
INTERVENTRICULAR SEPTUM IN DIASTOLE (PARASTERNAL SHORT AXIS VIEW): 1 CM
INTERVENTRICULAR SEPTUM: 1 CM (ref 0.6–1.1)
LAAS-AP2: 31.5 CM2
LAAS-AP4: 36.7 CM2
LEFT ATRIUM SIZE: 5.8 CM
LEFT ATRIUM VOLUME (MOD BIPLANE): 131 ML
LEFT ATRIUM VOLUME INDEX (MOD BIPLANE): 67.9 ML/M2
LEFT INTERNAL DIMENSION IN SYSTOLE: 3.5 CM (ref 2.1–4)
LEFT VENTRICLE DIASTOLIC VOLUME (MOD BIPLANE): 85 ML
LEFT VENTRICLE DIASTOLIC VOLUME INDEX (MOD BIPLANE): 44 ML/M2
LEFT VENTRICLE SYSTOLIC VOLUME (MOD BIPLANE): 44 ML
LEFT VENTRICLE SYSTOLIC VOLUME INDEX (MOD BIPLANE): 22.8 ML/M2
LEFT VENTRICULAR INTERNAL DIMENSION IN DIASTOLE: 5.3 CM (ref 3.5–6)
LEFT VENTRICULAR POSTERIOR WALL IN END DIASTOLE: 1.1 CM
LEFT VENTRICULAR STROKE VOLUME: 86 ML
LV EF: 48 %
LVSV (TEICH): 86 ML
MV EROA: 0.2 CM2
MV MEAN GRADIENT: 5 MMHG
MV REGURGITANT VOLUME: 34 ML
PISA MRMAX VEL: 0.36 M/S
PISA RADIUS: 0.7 CM
RA PRESSURE ESTIMATED: 8 MMHG
RIGHT ATRIUM AREA SYSTOLE A4C: 15.4 CM2
RIGHT VENTRICLE ID DIMENSION: 3 CM
RV PSP: 64 MMHG
SINOTUBULAR JUNCTION: 3.4 CM
SL CV LEFT ATRIUM LENGTH A2C: 7.1 CM
SL CV LV EF: 48
SL CV PED ECHO LEFT VENTRICLE DIASTOLIC VOLUME (MOD BIPLANE) 2D: 136 ML
SL CV PED ECHO LEFT VENTRICLE SYSTOLIC VOLUME (MOD BIPLANE) 2D: 50 ML
SL CV SINUS OF VALSALVA 2D: 2.8 CM
STJ: 3.4 CM
TR MAX PG: 56 MMHG
TR PEAK VELOCITY: 3.7 M/S
TRICUSPID ANNULAR PLANE SYSTOLIC EXCURSION: 1.5 CM
TRICUSPID VALVE PEAK REGURGITATION VELOCITY: 3.74 M/S

## 2024-09-04 PROCEDURE — 93306 TTE W/DOPPLER COMPLETE: CPT

## 2025-04-03 ENCOUNTER — OFFICE VISIT (OUTPATIENT)
Dept: URGENT CARE | Facility: CLINIC | Age: 85
End: 2025-04-03
Payer: MEDICARE

## 2025-04-03 ENCOUNTER — APPOINTMENT (OUTPATIENT)
Dept: RADIOLOGY | Facility: CLINIC | Age: 85
End: 2025-04-03
Payer: MEDICARE

## 2025-04-03 ENCOUNTER — RESULTS FOLLOW-UP (OUTPATIENT)
Dept: URGENT CARE | Facility: CLINIC | Age: 85
End: 2025-04-03

## 2025-04-03 VITALS
WEIGHT: 198 LBS | HEIGHT: 63 IN | DIASTOLIC BLOOD PRESSURE: 88 MMHG | TEMPERATURE: 97 F | OXYGEN SATURATION: 99 % | SYSTOLIC BLOOD PRESSURE: 130 MMHG | RESPIRATION RATE: 18 BRPM | HEART RATE: 62 BPM | BODY MASS INDEX: 35.08 KG/M2

## 2025-04-03 DIAGNOSIS — R05.1 ACUTE COUGH: ICD-10-CM

## 2025-04-03 DIAGNOSIS — J20.9 ACUTE BRONCHITIS, UNSPECIFIED ORGANISM: Primary | ICD-10-CM

## 2025-04-03 PROCEDURE — G0463 HOSPITAL OUTPT CLINIC VISIT: HCPCS | Performed by: PHYSICIAN ASSISTANT

## 2025-04-03 PROCEDURE — 71046 X-RAY EXAM CHEST 2 VIEWS: CPT

## 2025-04-03 PROCEDURE — 99214 OFFICE O/P EST MOD 30 MIN: CPT | Performed by: PHYSICIAN ASSISTANT

## 2025-04-03 RX ORDER — BENZONATATE 100 MG/1
100 CAPSULE ORAL 3 TIMES DAILY PRN
Qty: 20 CAPSULE | Refills: 0 | Status: SHIPPED | OUTPATIENT
Start: 2025-04-03

## 2025-04-03 RX ORDER — DOXYCYCLINE 100 MG/1
100 CAPSULE ORAL 2 TIMES DAILY
Qty: 14 CAPSULE | Refills: 0 | Status: SHIPPED | OUTPATIENT
Start: 2025-04-03 | End: 2025-04-10

## 2025-04-03 RX ORDER — SPIRONOLACTONE 25 MG/1
TABLET ORAL
COMMUNITY

## 2025-04-03 NOTE — PATIENT INSTRUCTIONS
Coughs are typically most bothersome the first 1-2 weeks. Coughs frequently linger for 4-6 weeks. However, have your lungs re-evaluated if you develop sudden worsening cough, shortness or breath or chest discomfort.     Tessalon and Doxycycline as prescribed  Take over the counter Mucinex during the day  Fluids and rest   Follow up with PCP in 3-5 days.  Proceed to  ER if symptoms worsen.    If tests have been performed at [facility], our office will contact you with results if changes need to be made to the care plan discussed with you at the visit.  You can [facility].    Eat yogurt with live and active cultures and/or take a probiotic at least 3 hours before or after antibiotic dose. Monitor stool for diarrhea and/or blood. If this occurs, contact primary care doctor ASAP.     Doxycycline may cause your skin to be more sensitive to sunlight than it is normally. Exposure to sunlight, even for short periods of time, may cause skin rash, itching, redness or other discoloration of the skin, or a severe sunburn. Practice proper precautions including avoiding excessive sunlight exposure, wear skin protection including clothing and sunscreen to avoid reaction.

## 2025-04-03 NOTE — PROGRESS NOTES
[facility]        NAME: Rose Marie Gonzalez is a 84 y.o. female  : 1940    MRN: [id number]  DATE: April 3, 2025  TIME: 2:22 PM    Assessment and Plan   Acute bronchitis, unspecified organism [J20.9]  1. Acute bronchitis, unspecified organism  XR chest pa and lateral    benzonatate (TESSALON PERLES) 100 mg capsule    doxycycline monohydrate (MONODOX) 100 mg capsule          Results  CXR provider read: lung hyperinflation consistent with obstructive airway disease. no acute cardiopulmonary disease. no pleural effusion, pulmonary edema or consolidation.     24: AST/ALT WNL  24: eGFR 57; Cr 1  Prior EKG from 2021: QT Interval 342    Will cover for bacterial forms of bronchitis due to the extent of patient's PMH.     Patient Instructions     Assessment & Plan    Coughs are typically most bothersome the first 1-2 weeks. Coughs frequently linger for 4-6 weeks. However, have your lungs re-evaluated if you develop sudden worsening cough, shortness or breath or chest discomfort.     Tessalon and Doxycycline as prescribed  Take over the counter Mucinex during the day  Fluids and rest   Follow up with PCP in 3-5 days.  Proceed to  ER if symptoms worsen.    If tests have been performed at [facility], our office will contact you with results if changes need to be made to the care plan discussed with you at the visit.  You can review your full results on [facility]'s MyChart.    Eat yogurt with live and active cultures and/or take a probiotic at least 3 hours before or after antibiotic dose. Monitor stool for diarrhea and/or blood. If this occurs, contact primary care doctor ASAP.     Doxycycline may cause your skin to be more sensitive to sunlight than it is normally. Exposure to sunlight, even for short periods of time, may cause skin rash, itching, redness or other discoloration of the skin, or a severe sunburn. Practice proper precautions including avoiding excessive sunlight exposure, wear skin  protection including clothing and sunscreen to avoid reaction.       Chief Complaint     Chief Complaint   Patient presents with    Cold Like Symptoms     Cough with chest congestion and panting sometimes. Cannot spit any of the mucous up.         History of Present Illness     History of Present Illness      Hx/o heart failure.     Cough  Pertinent negatives include no chills, ear pain, fever, headaches, myalgias, postnasal drip, rash, rhinorrhea, sore throat, shortness of breath or wheezing. Her past medical history is significant for bronchitis.       Review of Systems   Review of Systems   Constitutional:  Negative for chills, fatigue and fever.   HENT:  Negative for congestion, ear discharge, ear pain, postnasal drip, rhinorrhea, sinus pressure, sinus pain, sneezing, sore throat and trouble swallowing.    Respiratory:  Positive for cough. Negative for chest tightness, shortness of breath and wheezing.    Gastrointestinal:  Negative for abdominal pain, diarrhea, nausea and vomiting.   Musculoskeletal:  Negative for myalgias.   Skin:  Negative for rash.   Neurological:  Negative for light-headedness and headaches.         Current Medications       Current Outpatient Medications:     acetaminophen (TYLENOL) 325 mg tablet, Take 650 mg by mouth every 6 (six) hours as needed for mild pain , Disp: , Rfl:     atorvastatin (LIPITOR) 40 mg tablet, Take 40 mg by mouth daily, Disp: , Rfl:     benzonatate (TESSALON PERLES) 100 mg capsule, Take 1 capsule (100 mg total) by mouth 3 (three) times a day as needed for cough, Disp: 20 capsule, Rfl: 0    calcium carbonate (TUMS) 500 mg chewable tablet, Chew 1 tablet as needed for indigestion or heartburn, Disp: , Rfl:     Cholecalciferol (Vitamin D) 50 MCG (2000 UT) tablet, Take 2,000 Units by mouth daily, Disp: , Rfl:     doxycycline monohydrate (MONODOX) 100 mg capsule, Take 1 capsule (100 mg total) by mouth 2 (two) times a day for 7 days, Disp: 14 capsule, Rfl: 0    ferrous  sulfate 325 (65 Fe) mg tablet, Take 1 tablet (325 mg total) by mouth daily with breakfast, Disp: 30 tablet, Rfl: 0    furosemide (LASIX) 20 mg tablet, Take 20 mg by mouth 2 (two) times a day, Disp: , Rfl:     Latanoprost 0.005 % EMUL, Apply to eye daily at bedtime Pt uses in right eye only, Disp: , Rfl:     lisinopril (ZESTRIL) 5 mg tablet, Take 5 mg by mouth daily, Disp: , Rfl:     metoprolol succinate (TOPROL-XL) 50 mg 24 hr tablet, Take 50 mg by mouth daily, Disp: , Rfl:     albuterol (ProAir HFA) 90 mcg/act inhaler, Inhale 2 puffs every 6 (six) hours as needed for wheezing or shortness of breath (Patient not taking: Reported on 4/3/2025), Disp: 8.5 g, Rfl: 0    loperamide (Imodium A-D) 2 mg capsule, Take 2 mg by mouth 4 (four) times a day as needed for diarrhea (Patient not taking: Reported on 9/29/2021), Disp: , Rfl:     mupirocin (BACTROBAN) 2 % ointment, Apply topically 3 (three) times a day, Disp: 15 g, Rfl: 0    omeprazole (PriLOSEC) 10 mg delayed release capsule, Take 10 mg by mouth daily (Patient not taking: Reported on 4/3/2025), Disp: , Rfl:     spironolactone (ALDACTONE) 25 mg tablet, TAKE 1/2 TABLET BY MOUTH IN THE MORNING, Disp: , Rfl:     warfarin (COUMADIN) 4 mg tablet, Take 1 tablet (4 mg total) by mouth daily (Patient taking differently: Take 4 mg by mouth daily), Disp: 20 tablet, Rfl: 0    Current Allergies     Allergies as of 04/03/2025 - Reviewed 04/03/2025   Allergen Reaction Noted    Nsaids GI Intolerance and Abdominal Pain 12/08/2013    Augmentin [amoxicillin-pot clavulanate] GI Intolerance 04/07/2024    Cephalexin Other (See Comments) 07/31/2021    Nabumetone Other (See Comments) 07/31/2021            The following portions of the patient's history were reviewed and updated as appropriate: allergies, current medications, past family history, past medical history, past social history, past surgical history and problem list.     Past Medical History:   Diagnosis Date    Anemia     Atrial  "fibrillation (HCC)     Bradycardia     Colon polyp     GERD (gastroesophageal reflux disease)     History of pulmonary embolus (PE)     History of transfusion     Hypertension     Long term (current) use of anticoagulants     Osteoarthritis     Radiculopathy     Shortness of breath 09/29/2021    Pt has been reporting increased episodes of SOB- lying down and with mod exertion. Pt states it has been keeping her up at night.    Vitamin D deficiency        Past Surgical History:   Procedure Laterality Date    AORTIC VALVE REPLACEMENT      APPENDECTOMY      BACK SURGERY      COLONOSCOPY      EGD      EYE SURGERY      JOINT REPLACEMENT         Family History   Problem Relation Age of Onset    Cancer Father          Medications have been verified.        Objective   /88   Pulse 62   Temp (!) 97 °F (36.1 °C)   Resp 18   Ht 5' 3\" (1.6 m)   Wt 89.8 kg (198 lb)   SpO2 99%   BMI 35.07 kg/m²   No LMP recorded. Patient is postmenopausal.       Physical Exam     Physical Exam  Vitals reviewed.   Constitutional:       General: She is not in acute distress.     Appearance: She is well-developed. She is not diaphoretic.   HENT:      Head: Normocephalic and atraumatic.      Right Ear: Tympanic membrane, ear canal and external ear normal.      Left Ear: Tympanic membrane, ear canal and external ear normal.      Nose: Nose normal.      Mouth/Throat:      Pharynx: No oropharyngeal exudate or posterior oropharyngeal erythema.   Eyes:      General:         Right eye: No discharge.         Left eye: No discharge.   Cardiovascular:      Rate and Rhythm: Normal rate. Rhythm irregular.      Heart sounds: Normal heart sounds. No murmur heard.     No friction rub. No gallop.   Pulmonary:      Effort: Pulmonary effort is normal. No respiratory distress.      Breath sounds: Wheezing present. No rhonchi or rales.   Lymphadenopathy:      Cervical: No cervical adenopathy.   Skin:     General: Skin is warm.      Findings: No rash. "   Neurological:      Mental Status: She is alert.   Psychiatric:         Behavior: Behavior normal.         Thought Content: Thought content normal.         Judgment: Judgment normal.

## 2025-04-25 DIAGNOSIS — Z95.2 PRESENCE OF PROSTHETIC HEART VALVE: ICD-10-CM

## 2025-04-25 DIAGNOSIS — I49.3 VENTRICULAR PREMATURE DEPOLARIZATION: ICD-10-CM

## 2025-04-25 DIAGNOSIS — I42.8 OTHER CARDIOMYOPATHIES (HCC): ICD-10-CM

## 2025-04-25 DIAGNOSIS — I25.10 ATHEROSCLEROTIC HEART DISEASE OF NATIVE CORONARY ARTERY WITHOUT ANGINA PECTORIS: ICD-10-CM

## 2025-04-25 DIAGNOSIS — I48.20 CHRONIC ATRIAL FIBRILLATION, UNSPECIFIED (HCC): ICD-10-CM

## 2025-04-25 DIAGNOSIS — I45.4 NONSPECIFIC INTRAVENTRICULAR BLOCK: ICD-10-CM

## 2025-05-14 ENCOUNTER — APPOINTMENT (OUTPATIENT)
Dept: RADIOLOGY | Facility: CLINIC | Age: 85
End: 2025-05-14
Payer: MEDICARE

## 2025-05-14 ENCOUNTER — OFFICE VISIT (OUTPATIENT)
Dept: URGENT CARE | Facility: CLINIC | Age: 85
End: 2025-05-14
Payer: MEDICARE

## 2025-05-14 VITALS
HEART RATE: 76 BPM | SYSTOLIC BLOOD PRESSURE: 136 MMHG | OXYGEN SATURATION: 98 % | WEIGHT: 193 LBS | RESPIRATION RATE: 20 BRPM | HEIGHT: 64 IN | DIASTOLIC BLOOD PRESSURE: 84 MMHG | BODY MASS INDEX: 32.95 KG/M2 | TEMPERATURE: 97.7 F

## 2025-05-14 DIAGNOSIS — J22 LOWER RESPIRATORY INFECTION (E.G., BRONCHITIS, PNEUMONIA, PNEUMONITIS, PULMONITIS): Primary | ICD-10-CM

## 2025-05-14 DIAGNOSIS — J22 LOWER RESPIRATORY INFECTION (E.G., BRONCHITIS, PNEUMONIA, PNEUMONITIS, PULMONITIS): ICD-10-CM

## 2025-05-14 PROCEDURE — 99214 OFFICE O/P EST MOD 30 MIN: CPT

## 2025-05-14 PROCEDURE — 94640 AIRWAY INHALATION TREATMENT: CPT

## 2025-05-14 PROCEDURE — G0463 HOSPITAL OUTPT CLINIC VISIT: HCPCS

## 2025-05-14 PROCEDURE — 71046 X-RAY EXAM CHEST 2 VIEWS: CPT

## 2025-05-14 RX ORDER — BENZONATATE 100 MG/1
100 CAPSULE ORAL 3 TIMES DAILY PRN
Qty: 20 CAPSULE | Refills: 0 | Status: SHIPPED | OUTPATIENT
Start: 2025-05-14

## 2025-05-14 RX ORDER — DOXYCYCLINE 100 MG/1
100 TABLET ORAL 2 TIMES DAILY
Qty: 14 TABLET | Refills: 0 | Status: SHIPPED | OUTPATIENT
Start: 2025-05-14 | End: 2025-05-21

## 2025-05-14 RX ORDER — IPRATROPIUM BROMIDE AND ALBUTEROL SULFATE 2.5; .5 MG/3ML; MG/3ML
3 SOLUTION RESPIRATORY (INHALATION) ONCE
Status: COMPLETED | OUTPATIENT
Start: 2025-05-14 | End: 2025-05-14

## 2025-05-14 RX ADMIN — IPRATROPIUM BROMIDE AND ALBUTEROL SULFATE 3 ML: 2.5; .5 SOLUTION RESPIRATORY (INHALATION) at 11:14

## 2025-05-14 NOTE — PROGRESS NOTES
[facility]        NAME: Rose Marie Gonzalez is a 85 y.o. female  : 1940    MRN: [id number]  DATE: May 14, 2025  TIME: 11:35 AM    Assessment and Plan   Lower respiratory infection (e.g., bronchitis, pneumonia, pneumonitis, pulmonitis) [J22]  1. Lower respiratory infection (e.g., bronchitis, pneumonia, pneumonitis, pulmonitis)  XR chest pa and lateral    ipratropium-albuterol (DUO-NEB) 0.5-2.5 mg/3 mL inhalation solution 3 mL    Mini neb    doxycycline (ADOXA) 100 MG tablet    benzonatate (TESSALON PERLES) 100 mg capsule        Xray initial interpretation: chest xray - no acute cardiopulmonary abnormality   Official radiology read pending - We will only notify you if there needs to be a change in your treatment plan.   Mini neb administered in clinic with improvement of symptoms.     Patient Instructions     Coughs are typically most bothersome the first 1-2 weeks. Coughs frequently linger for 4-6 weeks. However, have your lungs re-evaluated if you develop sudden worsening cough, shortness or breath or chest discomfort.      Tessalon and Doxycycline as prescribed  Eat yogurt with live and active cultures and/or take a probiotic at least 3 hours before or after antibiotic dose. Monitor stool for diarrhea and/or blood. If this occurs, contact primary care doctor ASAP.     Take over the counter Mucinex during the day  Fluids and rest      Doxycycline may cause your skin to be more sensitive to sunlight than it is normally. Exposure to sunlight, even for short periods of time, may cause skin rash, itching, redness or other discoloration of the skin, or a severe sunburn. Practice proper precautions including avoiding excessive sunlight exposure, wear skin protection including clothing and sunscreen to avoid reaction.      Follow up with PCP in 3-5 days.  Proceed to  ER if symptoms worsen.    If tests are performed, our office will contact you with results only if changes need to made to the care plan  discussed with you at the visit. You can review your full results on [facility].    Chief Complaint     Chief Complaint   Patient presents with    Nasal Congestion     Here for about a month ago for congestion and cough; started to clear up but now symptoms have returned. Patient states cough is keeping her up at night.          History of Present Illness       85 year old female with significant PMH arrives reporting cough and congestion worsening over the past week.  Patient reports the cough is now keeping her up at night. Patient reports she was seen here approximately one month ago for similar symptoms and had complete resolution of symptoms for a few weeks.  Patient reports that over the past week cough and congestion has worsened.  Patient reports to taking tylenol only as needed for symptoms. Patient denies any swelling in BLE, denies SOB, denies CP.        Review of Systems   Review of Systems   Constitutional:  Positive for fatigue. Negative for chills, diaphoresis and fever.   HENT:  Positive for congestion. Negative for ear pain, sinus pressure, sinus pain and sore throat.    Respiratory:  Positive for cough. Negative for shortness of breath.    Cardiovascular: Negative.    Gastrointestinal: Negative.    Musculoskeletal: Negative.          Current Medications     Current Medications[1]    Current Allergies     Allergies as of 05/14/2025 - Reviewed 05/14/2025   Allergen Reaction Noted    Nsaids GI Intolerance and Abdominal Pain 12/08/2013    Augmentin [amoxicillin-pot clavulanate] GI Intolerance 04/07/2024    Cephalexin Other (See Comments) 07/31/2021    Nabumetone Other (See Comments) 07/31/2021            The following portions of the patient's history were reviewed and updated as appropriate: allergies, current medications, past family history, past medical history, past social history, past surgical history and problem list.     Past Medical History:   Diagnosis Date    Anemia     Atrial  "fibrillation (HCC)     Bradycardia     Colon polyp     GERD (gastroesophageal reflux disease)     History of pulmonary embolus (PE)     History of transfusion     Hypertension     Long term (current) use of anticoagulants     Osteoarthritis     Radiculopathy     Shortness of breath 09/29/2021    Pt has been reporting increased episodes of SOB- lying down and with mod exertion. Pt states it has been keeping her up at night.    Vitamin D deficiency        Past Surgical History:   Procedure Laterality Date    AORTIC VALVE REPLACEMENT      APPENDECTOMY      BACK SURGERY      COLONOSCOPY      EGD      EYE SURGERY      JOINT REPLACEMENT         Family History   Problem Relation Age of Onset    Cancer Father          Medications have been verified.        Objective   /84   Pulse 76   Temp 97.7 °F (36.5 °C)   Resp 20   Ht 5' 4\" (1.626 m)   Wt 87.5 kg (193 lb)   SpO2 98%   BMI 33.13 kg/m²        Physical Exam     Physical Exam  Vitals and nursing note reviewed.   Constitutional:       General: She is not in acute distress.     Appearance: Normal appearance. She is not ill-appearing, toxic-appearing or diaphoretic.   HENT:      Head: Normocephalic.      Right Ear: Tympanic membrane, ear canal and external ear normal.      Left Ear: Tympanic membrane, ear canal and external ear normal.      Nose: Congestion present.      Mouth/Throat:      Mouth: Mucous membranes are moist.      Pharynx: No oropharyngeal exudate or posterior oropharyngeal erythema.     Eyes:      Pupils: Pupils are equal, round, and reactive to light.       Cardiovascular:      Rate and Rhythm: Normal rate. Rhythm irregular.      Pulses: Normal pulses.      Heart sounds: Normal heart sounds.   Pulmonary:      Effort: Pulmonary effort is normal. No respiratory distress.      Breath sounds: No stridor. Wheezing, rhonchi and rales present.   Chest:      Chest wall: No tenderness.     Musculoskeletal:         General: Normal range of motion.      " Cervical back: Normal range of motion and neck supple.   Lymphadenopathy:      Cervical: No cervical adenopathy.     Skin:     General: Skin is warm and dry.      Capillary Refill: Capillary refill takes less than 2 seconds.     Neurological:      General: No focal deficit present.      Mental Status: She is alert and oriented to person, place, and time.     Psychiatric:         Mood and Affect: Mood normal.         Behavior: Behavior normal.               Mini neb    Performed by: CHARLI Ortiz  Authorized by: CHARLI Ortiz    Universal Protocol:  procedure performed by consultantConsent: Verbal consent obtained  Risks and benefits: risks, benefits and alternatives were discussed  Consent given by: patient  Patient understanding: patient states understanding of the procedure being performed  Patient identity confirmed: verbally with patient    Number of treatments:  1  Treatment 1:   Pre-Procedure     Symptoms:  Wheezing, cough and labored breathing    Lung Sounds:  Wheezing and rales    SP02:  98    Medication Administered:  Duoneb - Albuterol 2.5 mg/Atrovent 0.5 mg  Post-Procedure     Symptoms:  Cough    Lung sounds:  Clear to ascultation    SP02:  99             [1]   Current Outpatient Medications:     acetaminophen (TYLENOL) 325 mg tablet, Take 650 mg by mouth every 6 (six) hours as needed for mild pain, Disp: , Rfl:     atorvastatin (LIPITOR) 40 mg tablet, Take 40 mg by mouth in the morning., Disp: , Rfl:     benzonatate (TESSALON PERLES) 100 mg capsule, Take 1 capsule (100 mg total) by mouth 3 (three) times a day as needed for cough, Disp: 20 capsule, Rfl: 0    calcium carbonate (TUMS) 500 mg chewable tablet, Chew 1 tablet as needed for indigestion or heartburn, Disp: , Rfl:     Cholecalciferol (Vitamin D) 50 MCG (2000 UT) tablet, Take 2,000 Units by mouth in the morning., Disp: , Rfl:     doxycycline (ADOXA) 100 MG tablet, Take 1 tablet (100 mg total) by mouth 2 (two) times a day  for 7 days, Disp: 14 tablet, Rfl: 0    ferrous sulfate 325 (65 Fe) mg tablet, Take 1 tablet (325 mg total) by mouth daily with breakfast, Disp: 30 tablet, Rfl: 0    furosemide (LASIX) 20 mg tablet, Take 20 mg by mouth in the morning and 20 mg in the evening., Disp: , Rfl:     Latanoprost 0.005 % EMUL, Apply to eye daily at bedtime Pt uses in right eye only, Disp: , Rfl:     lisinopril (ZESTRIL) 5 mg tablet, Take 5 mg by mouth in the morning., Disp: , Rfl:     metoprolol succinate (TOPROL-XL) 50 mg 24 hr tablet, Take 50 mg by mouth in the morning., Disp: , Rfl:     spironolactone (ALDACTONE) 25 mg tablet, , Disp: , Rfl:     warfarin (COUMADIN) 4 mg tablet, Take 1 tablet (4 mg total) by mouth daily, Disp: 20 tablet, Rfl: 0    albuterol (ProAir HFA) 90 mcg/act inhaler, Inhale 2 puffs every 6 (six) hours as needed for wheezing or shortness of breath (Patient not taking: Reported on 5/14/2025), Disp: 8.5 g, Rfl: 0    benzonatate (TESSALON PERLES) 100 mg capsule, Take 1 capsule (100 mg total) by mouth 3 (three) times a day as needed for cough (Patient not taking: Reported on 5/14/2025), Disp: 20 capsule, Rfl: 0    loperamide (Imodium A-D) 2 mg capsule, Take 2 mg by mouth 4 (four) times a day as needed for diarrhea (Patient not taking: Reported on 9/29/2021), Disp: , Rfl:     mupirocin (BACTROBAN) 2 % ointment, Apply topically 3 (three) times a day (Patient not taking: Reported on 5/14/2025), Disp: 15 g, Rfl: 0    omeprazole (PriLOSEC) 10 mg delayed release capsule, Take 10 mg by mouth daily (Patient not taking: Reported on 5/14/2025), Disp: , Rfl:   No current facility-administered medications for this visit.

## 2025-05-14 NOTE — PATIENT INSTRUCTIONS
Coughs are typically most bothersome the first 1-2 weeks. Coughs frequently linger for 4-6 weeks. However, have your lungs re-evaluated if you develop sudden worsening cough, shortness or breath or chest discomfort.      Tessalon and Doxycycline as prescribed  Eat yogurt with live and active cultures and/or take a probiotic at least 3 hours before or after antibiotic dose. Monitor stool for diarrhea and/or blood. If this occurs, contact primary care doctor ASAP.     Take over the counter Mucinex during the day  Fluids and rest      Doxycycline may cause your skin to be more sensitive to sunlight than it is normally. Exposure to sunlight, even for short periods of time, may cause skin rash, itching, redness or other discoloration of the skin, or a severe sunburn. Practice proper precautions including avoiding excessive sunlight exposure, wear skin protection including clothing and sunscreen to avoid reaction.      Follow up with PCP in 3-5 days.  Proceed to  ER if symptoms worsen.    If tests are performed, our office will contact you with results only if changes need to made to the care plan discussed with you at the visit. You can review your full results on [facility].